# Patient Record
Sex: FEMALE | HISPANIC OR LATINO | Employment: FULL TIME | URBAN - METROPOLITAN AREA
[De-identification: names, ages, dates, MRNs, and addresses within clinical notes are randomized per-mention and may not be internally consistent; named-entity substitution may affect disease eponyms.]

---

## 2018-10-17 ENCOUNTER — OFFICE VISIT (OUTPATIENT)
Dept: OBGYN | Facility: CLINIC | Age: 23
End: 2018-10-17
Payer: COMMERCIAL

## 2018-10-17 VITALS
BODY MASS INDEX: 28.9 KG/M2 | WEIGHT: 158 LBS | HEART RATE: 67 BPM | SYSTOLIC BLOOD PRESSURE: 107 MMHG | DIASTOLIC BLOOD PRESSURE: 54 MMHG | TEMPERATURE: 97.8 F

## 2018-10-17 DIAGNOSIS — Z00.00 ANNUAL PHYSICAL EXAM: Primary | ICD-10-CM

## 2018-10-17 LAB
B-HCG SERPL-ACNC: <1 IU/L (ref 0–5)
CHOLEST SERPL-MCNC: 188 MG/DL
ERYTHROCYTE [DISTWIDTH] IN BLOOD BY AUTOMATED COUNT: 13.9 % (ref 10–15)
HCT VFR BLD AUTO: 38.6 % (ref 35–47)
HDLC SERPL-MCNC: 56 MG/DL
HGB BLD-MCNC: 12.8 G/DL (ref 11.7–15.7)
LDLC SERPL CALC-MCNC: 108 MG/DL
MCH RBC QN AUTO: 27.1 PG (ref 26.5–33)
MCHC RBC AUTO-ENTMCNC: 33.2 G/DL (ref 31.5–36.5)
MCV RBC AUTO: 82 FL (ref 78–100)
NONHDLC SERPL-MCNC: 132 MG/DL
PLATELET # BLD AUTO: 301 10E9/L (ref 150–450)
RBC # BLD AUTO: 4.72 10E12/L (ref 3.8–5.2)
TRIGL SERPL-MCNC: 121 MG/DL
TSH SERPL DL<=0.005 MIU/L-ACNC: 2.07 MU/L (ref 0.4–4)
WBC # BLD AUTO: 7.9 10E9/L (ref 4–11)

## 2018-10-17 PROCEDURE — 36415 COLL VENOUS BLD VENIPUNCTURE: CPT | Performed by: OBSTETRICS & GYNECOLOGY

## 2018-10-17 PROCEDURE — 87491 CHLMYD TRACH DNA AMP PROBE: CPT | Performed by: OBSTETRICS & GYNECOLOGY

## 2018-10-17 PROCEDURE — 84443 ASSAY THYROID STIM HORMONE: CPT | Performed by: OBSTETRICS & GYNECOLOGY

## 2018-10-17 PROCEDURE — 84702 CHORIONIC GONADOTROPIN TEST: CPT | Performed by: OBSTETRICS & GYNECOLOGY

## 2018-10-17 PROCEDURE — G0145 SCR C/V CYTO,THINLAYER,RESCR: HCPCS | Performed by: OBSTETRICS & GYNECOLOGY

## 2018-10-17 PROCEDURE — 80061 LIPID PANEL: CPT | Performed by: OBSTETRICS & GYNECOLOGY

## 2018-10-17 PROCEDURE — 85027 COMPLETE CBC AUTOMATED: CPT | Performed by: OBSTETRICS & GYNECOLOGY

## 2018-10-17 PROCEDURE — 87591 N.GONORRHOEAE DNA AMP PROB: CPT | Performed by: OBSTETRICS & GYNECOLOGY

## 2018-10-17 PROCEDURE — 99395 PREV VISIT EST AGE 18-39: CPT | Performed by: OBSTETRICS & GYNECOLOGY

## 2018-10-17 NOTE — PROGRESS NOTES
Dario is a 23 year old  female who presents for annual exam.     Menses are irregular and irregular lasting 7 days.  Menses flow: normal.  No LMP recorded.. Using none for contraception.  She is currently considering pregnancy.  Has been trying for the last 2 months, but her periods are slightly irregular.  Besides routine health maintenance, she has no other health concerns today .  GYNECOLOGIC HISTORY:  Menarche: 11  Age at first intercourse: 15 Number of lifetime partners: less than 6  Dario is sexually active with 1 male partner(s) and is currently in monogamous relationship with .    History sexually transmitted infections:No STD history  STI testing offered?  Accepted  CYNDEE exposure: No  History of abnormal Pap smear: NO - age 21-29 PAP every 3 years recommended  Family history of breast CA: No  Family history of uterine/ovarian CA: No    Family history of colon CA: No    HEALTH MAINTENANCE:  Cholesterol: (No results found for: CHOL History of abnormal lipids: No  Mammo: 0 . History of abnormal Mammo: Not applicable, 0.  Regular Self Breast Exams: Yes  Calcium/Vitamin D intake: source:  dairy Adequate? Yes  TSH: (  TSH   Date Value Ref Range Status   10/14/2014 2.36 mcU/mL Final    )  Pap; (No results found for: PAP )    HISTORY:  Obstetric History       T1      L2     SAB1   TAB0   Ectopic0   Multiple0   Live Births2       # Outcome Date GA Lbr Azael/2nd Weight Sex Delivery Anes PTL Lv   3  12/31/15 34w1d 01:45 / 00:22 6 lb 9.8 oz (3 kg) M Vag-Spont None Y JIM      Apgar1:  8                Apgar5: 9   2 SAB 2015 9w0d    SAB      1 Term 12 38w4d 06:45 / 01:13 6 lb 9 oz (2.977 kg) M Vag-Spont Local N JIM      Name: BLADIMIR,JOHN CARMICHAEL      Apgar1:  9                Apgar5: 9        Past Medical History:   Diagnosis Date     Diabetes mellitus (H)     gestational     NO ACTIVE PROBLEMS      Past Surgical History:   Procedure Laterality Date     NO HISTORY OF SURGERY        Family History   Problem Relation Age of Onset     Cancer Maternal Uncle      brain or spinal cord cancer?     Social History     Social History     Marital status:      Spouse name: N/A     Number of children: N/A     Years of education: N/A     Social History Main Topics     Smoking status: Never Smoker     Smokeless tobacco: Never Used     Alcohol use No     Drug use: No     Sexual activity: Yes     Partners: Male     Other Topics Concern     None     Social History Narrative     No current outpatient prescriptions on file.   No Known Allergies    Past medical, surgical, social and family history were reviewed and updated in EPIC.    ROS:   C:     NEGATIVE for fever, chills, change in weight  I:       NEGATIVE for worrisome rashes, moles or lesions  E:     NEGATIVE for vision changes or irritation  E/M: NEGATIVE for ear, mouth and throat problems  R:     NEGATIVE for significant cough or SOB  CV:   NEGATIVE for chest pain, palpitations or peripheral edema  GI:     NEGATIVE for nausea, abdominal pain, heartburn, or change in bowel habits  :   NEGATIVE for frequency, dysuria, hematuria, vaginal discharge, or irregular bleeding  M:     NEGATIVE for significant arthralgias or myalgia  N:      NEGATIVE for weakness, dizziness or paresthesias  E:      NEGATIVE for temperature intolerance, skin/hair changes  P:      NEGATIVE for changes in mood or affect.    EXAM:  /54 (BP Location: Left arm, Patient Position: Sitting, Cuff Size: Adult Regular)  Pulse 67  Temp 97.8  F (36.6  C) (Oral)  Wt 158 lb (71.7 kg)  BMI 28.9 kg/m2   BMI: Body mass index is 28.9 kg/(m^2).  Constitutional: healthy, alert and no distress  Head: Normocephalic. No masses, lesions, tenderness or abnormalities  Neck: Neck supple. Trachea midline. No adenopathy. Thyroid symmetric, normal size.   Cardiovascular: RRR.   Respiratory: Negative.   Breast: No nodularity, asymmetry or nipple discharge bilaterally.  Gastrointestinal:  Abdomen soft, non-tender, non-distended. No masses, organomegaly.  :  Vulva:  No external lesions, normal female hair distribution, no inguinal adenopathy.    Urethra:  Midline, non-tender, well supported, no discharge  Vagina:  Moist, pink, no abnormal discharge, no lesions  Uterus:  Normal size, anteverted , non-tender, freely mobile  Ovaries:  No masses appreciated, non-tender, mobile  Rectal Exam: deferred  Musculoskeletal: extremities normal  Skin: no suspicious lesions or rashes  Psychiatric: Affect appropriate, cooperative,mentation appears normal.     COUNSELING:   Reviewed preventive health counseling, as reflected in patient instructions       Healthy diet/nutrition       Family planning   reports that she has never smoked. She has never used smokeless tobacco.    Body mass index is 28.9 kg/(m^2).    FRAX Risk Assessment    ASSESSMENT:  23 year old female with satisfactory annual exam  (Z00.00) Annual physical exam  (primary encounter diagnosis)  Comment: Normal exam today.  Pap and STI screening obtained.  Recommended she track her cycles and if not pregnant within 6 months, she could come back to the clinic to discuss her irregular cycles and trying to conceive.  We did discuss that 80-90% of women will get pregnant spontaneously within the first year of trying.  Plan: Lipid Profile, TSH with free T4 reflex, CBC         with platelets, HCG Quantitative Pregnancy,         Blood ([id number]), NEISSERIA GONORRHOEA PCR,         CHLAMYDIA TRACHOMATIS PCR, Pap imaged thin         layer screen reflex to HPV if ASCUS - recommend        age 25 - 29            Erin Hyman MD

## 2018-10-17 NOTE — MR AVS SNAPSHOT
"              After Visit Summary   10/17/2018    Dario Nathan    MRN: [id number]           Patient Information     Date Of Birth          1995        Visit Information        Provider Department      10/17/2018 11:15 AM Erin Hyman MD [facility]        Today's Diagnoses     Annual physical exam    -  1       Follow-ups after your visit        Who to contact     If you have questions or need follow up information about today's clinic visit or your schedule please contact [facility] directly at [phone].  Normal or non-critical lab and imaging results will be communicated to you by Gramcohart, letter or phone within 4 business days after the clinic has received the results. If you do not hear from us within 7 days, please contact the clinic through Gramcohart or phone. If you have a critical or abnormal lab result, we will notify you by phone as soon as possible.  Submit refill requests through Geoloqi or call your pharmacy and they will forward the refill request to us. Please allow 3 business days for your refill to be completed.          Additional Information About Your Visit        MyChart Information     Geoloqi lets you send messages to your doctor, view your test results, renew your prescriptions, schedule appointments and more. To sign up, go to [email or web address] . Click on \"Log in\" on the left side of the screen, which will take you to the Welcome page. Then click on \"Sign up Now\" on the right side of the page.     You will be asked to enter the access code listed below, as well as some personal information. Please follow the directions to create your username and password.     Your access code is: [email or web address]  Expires: 1/15/2019 11:56 AM     Your access code will  in 90 days. If you need help or a new code, please call your [facility] or [phone].        Care EveryWhere ID     This is your Care EveryWhere ID. This could be used by other " organizations to access your [place] medical records  [id number]        Your Vitals Were     Pulse Temperature BMI (Body Mass Index)             67 97.8  F (36.6  C) (Oral) 28.9 kg/m2          Blood Pressure from Last 3 Encounters:   10/17/18 107/54   02/25/16 102/67   01/02/16 112/68    Weight from Last 3 Encounters:   10/17/18 158 lb (71.7 kg)   02/25/16 152 lb 4.8 oz (69.1 kg)   12/29/15 156 lb 11.2 oz (71.1 kg)              We Performed the Following     CBC with platelets     HCG Quantitative Pregnancy, Blood ([id number])     Lipid Profile     TSH with free T4 reflex        Primary Care Provider Fax #    Physician No Ref-Primary [phone]       No address on file        Equal Access to Services     NIEVES REMY : Neelam Gonzalez, walynda ul, bashir kaalmamary norris, betsy stratton . So [facility] [phone].    ATENCIÓN: Si habla español, tiene a castro disposición servicios gratuitos de asistencia lingüística. Llame al [phone].    We comply with applicable federal civil rights laws and [place] laws. We do not discriminate on the basis of race, color, national origin, age, disability, sex, sexual orientation, or gender identity.            Thank you!     Thank you for choosing [facility]  for your care. Our goal is always to provide you with excellent care. Hearing back from our patients is one way we can continue to improve our services. Please take a few minutes to complete the written survey that you may receive in the mail after your visit with us. Thank you!             Your Updated Medication List - Protect others around you: Learn how to safely use, store and throw away your medicines at www.disposemymeds.org.      Notice  As of 10/17/2018 11:56 AM    You have not been prescribed any medications.

## 2018-10-17 NOTE — NURSING NOTE
"Chief Complaint   Patient presents with     Physical       Initial /54 (BP Location: Left arm, Patient Position: Sitting, Cuff Size: Adult Regular)  Pulse 67  Temp 97.8  F (36.6  C) (Oral)  Wt 158 lb (71.7 kg)  BMI 28.9 kg/m2 Estimated body mass index is 28.9 kg/(m^2) as calculated from the following:    Height as of 11/11/15: 5' 2\" (1.575 m).    Weight as of this encounter: 158 lb (71.7 kg).  BP completed using cuff size: regular        The following HM Due: pap smear      The following patient reported/Care Every where data was sent to:  P ABSTRACT QUALITY INITIATIVES [[id number]]  EDIS Acevedo           "

## 2018-10-20 LAB
C TRACH DNA SPEC QL NAA+PROBE: NEGATIVE
N GONORRHOEA DNA SPEC QL NAA+PROBE: NEGATIVE
SPECIMEN SOURCE: NORMAL
SPECIMEN SOURCE: NORMAL

## 2018-10-22 LAB
COPATH REPORT: NORMAL
PAP: NORMAL

## 2019-10-04 ENCOUNTER — HOSPITAL ENCOUNTER (EMERGENCY)
Facility: CLINIC | Age: 24
Discharge: HOME | End: 2019-10-05
Attending: EMERGENCY MEDICINE | Admitting: EMERGENCY MEDICINE
Payer: COMMERCIAL

## 2019-10-04 DIAGNOSIS — B00.9 HSV (HERPES SIMPLEX VIRUS) INFECTION: ICD-10-CM

## 2019-10-04 PROCEDURE — 87491 CHLMYD TRACH DNA AMP PROBE: CPT | Performed by: EMERGENCY MEDICINE

## 2019-10-04 PROCEDURE — 81025 URINE PREGNANCY TEST: CPT | Performed by: EMERGENCY MEDICINE

## 2019-10-04 PROCEDURE — 87210 SMEAR WET MOUNT SALINE/INK: CPT | Performed by: EMERGENCY MEDICINE

## 2019-10-04 PROCEDURE — 81001 URINALYSIS AUTO W/SCOPE: CPT | Performed by: EMERGENCY MEDICINE

## 2019-10-04 PROCEDURE — 87529 HSV DNA AMP PROBE: CPT | Performed by: EMERGENCY MEDICINE

## 2019-10-04 PROCEDURE — 99284 EMERGENCY DEPT VISIT MOD MDM: CPT

## 2019-10-04 PROCEDURE — 87591 N.GONORRHOEAE DNA AMP PROB: CPT | Performed by: EMERGENCY MEDICINE

## 2019-10-04 ASSESSMENT — ENCOUNTER SYMPTOMS
DYSURIA: 1
ABDOMINAL PAIN: 0
FEVER: 0

## 2019-10-04 NOTE — ED AVS SNAPSHOT
[facility] Emergency Department  [address]  [facility] [address]  Phone:  [phone]  Fax:  [phone]                                    Dario Nathan   MRN: [id number]    Department:  [facility] Emergency Department   Date of Visit:  10/4/2019           After Visit Summary Signature Page    I have received my discharge instructions, and my questions have been answered. I have discussed any challenges I see with this plan with the nurse or doctor.    ..........................................................................................................................................  Patient/Patient Representative Signature      ..........................................................................................................................................  Patient Representative Print Name and Relationship to Patient    ..................................................               ................................................  Date                                   Time    ..........................................................................................................................................  Reviewed by Signature/Title    ...................................................              ..............................................  Date                                               Time          22EPIC Rev 08/18

## 2019-10-05 VITALS
SYSTOLIC BLOOD PRESSURE: 124 MMHG | RESPIRATION RATE: 18 BRPM | HEART RATE: 92 BPM | OXYGEN SATURATION: 97 % | TEMPERATURE: 98 F | DIASTOLIC BLOOD PRESSURE: 83 MMHG

## 2019-10-05 LAB
ALBUMIN UR-MCNC: NEGATIVE MG/DL
AMORPH CRY #/AREA URNS HPF: ABNORMAL /HPF
APPEARANCE UR: CLEAR
BILIRUB UR QL STRIP: NEGATIVE
COLOR UR AUTO: ABNORMAL
GLUCOSE UR STRIP-MCNC: NEGATIVE MG/DL
HCG UR QL: NEGATIVE
HGB UR QL STRIP: NEGATIVE
KETONES UR STRIP-MCNC: NEGATIVE MG/DL
LEUKOCYTE ESTERASE UR QL STRIP: NEGATIVE
MUCOUS THREADS #/AREA URNS LPF: PRESENT /LPF
NITRATE UR QL: NEGATIVE
PH UR STRIP: 7 PH (ref 5–7)
RBC #/AREA URNS AUTO: <1 /HPF (ref 0–2)
SOURCE: ABNORMAL
SP GR UR STRIP: 1.01 (ref 1–1.03)
SPECIMEN SOURCE: NORMAL
SQUAMOUS #/AREA URNS AUTO: <1 /HPF (ref 0–1)
UROBILINOGEN UR STRIP-MCNC: NORMAL MG/DL (ref 0–2)
WBC #/AREA URNS AUTO: <1 /HPF (ref 0–5)
WET PREP SPEC: NORMAL

## 2019-10-05 RX ORDER — ACYCLOVIR 400 MG/1
400 TABLET ORAL EVERY 8 HOURS
Qty: 30 TABLET | Refills: 0 | Status: SHIPPED | OUTPATIENT
Start: 2019-10-05 | End: 2022-05-18

## 2019-10-05 NOTE — ED PROVIDER NOTES
History     Chief Complaint:    Vaginal Rash    The history is provided by the patient.      Dario Nathan is a 23 year old female who presents with blister-like rashes on the outer lips of her labia. She first noticed them 3 days ago. The patient reports dysuria. She denies any discharge, itchiness, abdominal pain, fevers, or vaginal pain. The patient denies any new partners, but did do a one-time swap of partners with another couple 7 days ago. The patient did use condoms and reported using new female sanitizing wipes.    Allergies:  No Known Drug Allergies     Medications:    The patient is currently on no regular medications.    Past Medical History:    Diabetes mellitus    Past Surgical History:    The patient does not have any pertinent past surgical history.    Family History:    No past pertinent family history.    Social History:  Negative for tobacco use.  Negative for alcohol use.  Negative for drug use.  Marital Status:   [2]     Review of Systems   Constitutional: Negative for fever.   Gastrointestinal: Negative for abdominal pain.   Genitourinary: Positive for dysuria. Negative for vaginal discharge and vaginal pain.        Denies vaginal itchiness   Skin: Positive for rash.   All other systems reviewed and are negative.        Physical Exam     Patient Vitals for the past 24 hrs:   BP Temp Heart Rate Resp SpO2   10/04/19 2302 132/72 98  F (36.7  C) 84 18 97 %     Physical Exam    Constitutional:  Oriented to person, place, and time. Well appearing.  HENT:   Head:    Normocephalic.   Mouth/Throat:   Oropharynx is clear and moist.   Eyes:    EOM are normal. Pupils are equal, round, and reactive to light.   Neck:    Neck supple.   Abdominal:   Soft. No distension. No tenderness. No rebound and no guarding.   Neurological:   Alert and oriented to person, place, and time.           Moves all 4 extremities spontaneously    Skin:    No rash noted. No pallor.   :    Multiple vesicles noted  bilaterally along labia majora, left greater than right. White vaginal discharge present. No CMT, no adnexal tenderness.    Emergency Department Course     Laboratory:  Laboratory findings were communicated with the patient who voiced understanding of the findings.    HSV1 and 2 DNA by PCR: Pending  Wet prep: Moderate PMNs seen o/w WNL.  Chlamydia trachomatis PCR: Pending  Neisseria gonorrhea PCR: Pending    HCG qualitative urine: Negative  UA: clear, yellow urine, mucous present, amorphous crystals few o/w negative    Emergency Department Course:  Past medical records, nursing notes, and vitals reviewed.    2332: I performed an exam of the patient as documented above.     IV was inserted and blood was drawn for laboratory testing, results above.    The patient provided a urine sample here in the emergency department. This was sent for laboratory testing, findings above.    0044: Patient rechecked and updated.      I personally reviewed the laboratory results with the Patient and answered all related questions prior to discharge.     Findings and plan explained to the Patient. Patient discharged home with instructions regarding supportive care, medications, and reasons to return. The importance of close follow-up was reviewed.     Impression & Plan     Medical Decision Making:  Dario Nathan is a 23 year old female who presents for evaluation of rash on the labia majora.  This is clinically consistent with HSV with grouped vesicles on an erythematous base.  Would treat with valtrex bid as no contraindications.  HSV PCR was sent.  Follow up with primary to discuss PCR result and suppressive therapy.  Recommended other STI testing per primary.  No signs of cellulitis in the HSV area.       Discharge Diagnosis:    ICD-10-CM    1. HSV (herpes simplex virus) infection B00.9      Disposition:  Discharged to home.    Discharge Medications:  New Prescriptions    ACYCLOVIR (ZOVIRAX) 400 MG TABLET    Take 1 tablet (400 mg) by  mouth every 8 hours for 10 days     Scribe Disclosure:  I, Berenice Ac, am serving as a scribe at 11:36 PM on 10/4/2019 to document services personally performed by Horace Vasquez MD based on my observations and the provider's statements to me.     Berenice Shen  10/4/2019   [facility] EMERGENCY DEPARTMENT       Horace Vasquez MD  10/06/19 0029

## 2019-10-05 NOTE — ED TRIAGE NOTES
"Patient states \" I started to have pain on my labia 2 days ago, and now have a cluster of blisters\"    Denies vaginal discharge   "

## 2019-10-06 ENCOUNTER — TELEPHONE (OUTPATIENT)
Dept: EMERGENCY MEDICINE | Facility: CLINIC | Age: 24
End: 2019-10-06

## 2019-10-06 LAB
C TRACH DNA SPEC QL NAA+PROBE: NEGATIVE
HSV1 DNA SPEC QL NAA+PROBE: POSITIVE
HSV2 DNA SPEC QL NAA+PROBE: NEGATIVE
N GONORRHOEA DNA SPEC QL NAA+PROBE: NEGATIVE
SPECIMEN SOURCE: ABNORMAL
SPECIMEN SOURCE: NORMAL
SPECIMEN SOURCE: NORMAL

## 2019-10-06 NOTE — TELEPHONE ENCOUNTER
[facility] Emergency Department Lab result notification:    [place] ED lab result protocol used  Herpes simplex protocol    Reason for call  Notify of lab results, assess symptoms,  review ED providers recommendations/discharge instructions (if necessary) and advise per ED lab result f/u protocol    Lab Result   Final result for HSV 1 is [POSITIVE] and HSV 2 DNA by PCR is [NEGATIVE].    [place] ED discharge antiviral medication (if prescribed): Acyclovir (ZOVIRAX) 400 MG tablet, Take 1 tablet (400 mg) by mouth every 8 hours for 10 days  If treated in the [place] ED, Notify patient of result.  Information table from ED Provider visit on 10/4/19-10/5/19  Symptoms reported at ED visit (Chief complaint, HPI) Vaginal Rash     The history is provided by the patient.      Dario Nathan is a 23 year old female who presents with blister-like rashes on the outer lips of her labia. She first noticed them 3 days ago. The patient reports dysuria. She denies any discharge, itchiness, abdominal pain, fevers, or vaginal pain. The patient denies any new partners, but did do a one-time swap of partners with another couple 7 days ago. The patient did use condoms and reported using new female sanitizing wipes.   ED providers Impression and Plan (applicable information) Dario Nathan is a 23 year old female who presents for evaluation of rash on the labia majora.  This is clinically consistent with HSV with grouped vesicles on an erythematous base.  Would treat with valtrex bid as no contraindications.  HSV PCR was sent.  Follow up with primary to discuss PCR result and suppressive therapy.  Recommended other STI testing per primary.  No signs of cellulitis in the HSV area.      Miscellaneous information Negative gonorrhea and chlamydia results     RN Assessment (Patient s current Symptoms), include time called.  [Insert Left message here if message left]  5:20PM:  Patient returned call. States she is feeling alittle bit  better, sores are not quite as painful.   RN Recommendations/Instructions per [place] ED lab result protocol  Patient notified of lab result and treatment recommendation.     Information about Herpes Simplex virus reviewed with Patient:     If antiviral medication prescribed, Patient advised to follow-up with PCP within a week as directed by the ED provider.     Initiation of oral antiviral therapy within 72 hours of lesion appearance may decrease duration and severity of illness by days to weeks.    Stress the importance to informing current sexual partners about genital herpes and informing future partners before initiating sexual relationship.    Encourage patient to contact PCP clinic if symptoms worsen or other concerning symptoms.  Patient can always consider returning to the ED if symptoms are worse or other concerning symptoms.    If applicable, have patient f/u with PCP regarding testing for HSV 1 or HSV 2    Genital Herpes - Summary points below:  o Genital herpes is a viral infection that is spread during sex.  o Symptoms of genital herpes include blisters in the genital area (eg, penis, buttocks, anus, vulva). The blisters become painful ulcers. Some people have no symptoms at all.  o It is possible to spread herpes even if there are no visible ulcers. It is not possible to catch herpes by touching a surface (door knobs, toilet seat, bed sheets).  o Antiviral medications help to speed healing of ulcers in people who have just been infected or in those who are having repeat outbreaks.   o Some people who have herpes outbreaks take medicine every day to prevent future outbreaks or prevent spread to their sex partner.      Patient is comfortable with the plan of care and has no further questions.   She has a follow up scheduled with a provider for 10/8/19    Please Contact your PCP clinic or return to the Emergency department if your:    Symptoms worsen or other concerning symptom's.    PCP follow-up  Questions asked: YES       [RN Name]  Ning Mooney RN  [employer] RN  Lung Nodule and ED Lab Result RN  Epic pool (ED late result f/u RN): P [id number]  [facility] INCIDENTAL RADIOLOGY F/U NURSES: P [id number]  # [phone]      Copy of Lab result   HSV 1 and 2 DNA by PCR [[id number]] (Order [id number])   Order Requisition     HSV 1 and 2 DNA by PCR (Order #[id number]) on 10/4/19   Exam Information     Exam Date Exam Time Accession # Results    10/4/19 11:55 PM [id number]    Component Value Flag Ref Range Units Status Collected Lab   HSV Specimen Type Genital     Final 10/04/2019 11:55 PM FrRdHs   HSV Type 1 PCR Positive  Abnormal   NEG^Negative  Final 10/04/2019 11:55    HSV Type 2 PCR Negative   NEG^Negative  Final 10/04/2019 11:55    Comment:     The qualitative Herpes simplex virus DNA assay is a real-time polymerase   chain reaction (PCR) utilizing analyte specific reagents manufactured by [employer].  Analyte Specific Reagents (ASRs) are used in many laboratory   tests necessary for standard medical care and generally do not require FDA   approval.     This test was developed and its performance characteristics determined by   the [facility].  It has not been   cleared or approved by the US Food and Drug Administration.

## 2019-10-08 ENCOUNTER — OFFICE VISIT (OUTPATIENT)
Dept: INTERNAL MEDICINE | Facility: CLINIC | Age: 24
End: 2019-10-08
Payer: COMMERCIAL

## 2019-10-08 VITALS
HEART RATE: 78 BPM | HEIGHT: 62 IN | SYSTOLIC BLOOD PRESSURE: 106 MMHG | OXYGEN SATURATION: 100 % | BODY MASS INDEX: 29.37 KG/M2 | TEMPERATURE: 98.4 F | WEIGHT: 159.6 LBS | RESPIRATION RATE: 18 BRPM | DIASTOLIC BLOOD PRESSURE: 66 MMHG

## 2019-10-08 DIAGNOSIS — Z11.3 SCREEN FOR STD (SEXUALLY TRANSMITTED DISEASE): ICD-10-CM

## 2019-10-08 DIAGNOSIS — B00.9 HSV (HERPES SIMPLEX VIRUS) INFECTION: Primary | ICD-10-CM

## 2019-10-08 LAB — HIV 1+2 AB+HIV1 P24 AG SERPL QL IA: NONREACTIVE

## 2019-10-08 PROCEDURE — 87389 HIV-1 AG W/HIV-1&-2 AB AG IA: CPT | Performed by: INTERNAL MEDICINE

## 2019-10-08 PROCEDURE — 36415 COLL VENOUS BLD VENIPUNCTURE: CPT | Performed by: INTERNAL MEDICINE

## 2019-10-08 PROCEDURE — 99202 OFFICE O/P NEW SF 15 MIN: CPT | Performed by: INTERNAL MEDICINE

## 2019-10-08 ASSESSMENT — MIFFLIN-ST. JEOR: SCORE: 1422.19

## 2019-10-08 NOTE — NURSING NOTE
"/66 (BP Location: Right arm, Patient Position: Sitting, Cuff Size: Adult Large)   Pulse 78   Temp 98.4  F (36.9  C) (Oral)   Resp 18   Ht 1.575 m (5' 2\")   Wt 72.4 kg (159 lb 9.6 oz)   LMP 08/28/2019 (Approximate)   SpO2 100%   Breastfeeding? No   BMI 29.19 kg/m    Alejandrina Jacobs CMA    "

## 2019-10-08 NOTE — PROGRESS NOTES
"Subjective     Dario Nathan is a 23 year old female who presents to clinic today for the following health issues:    HPI   A few weeks ago she and her  had sex with another couple for the first time. Last week she developed painful sores around the vaginal area was seen in ER and given Valtrex tid. Since she she has had a couple new areas of breakout but they have not progressed to open ulcers get raised red irritated and then resolve. She has no rectal sores and no constipation. She tested negative for GC and Chlamydia. She has never had an STD and would like to be checked for HIV.Denies any fever chills or night sweats. Energy is good. She has been able to work even though her job requires a lot of walking. No vaginal discharge       BP Readings from Last 3 Encounters:   10/08/19 106/66   10/05/19 124/83   10/17/18 107/54    Wt Readings from Last 3 Encounters:   10/08/19 72.4 kg (159 lb 9.6 oz)   10/17/18 71.7 kg (158 lb)   02/25/16 69.1 kg (152 lb 4.8 oz)                 Reviewed and updated as needed this visit by Provider         Review of Systems   ROS COMP: Constitutional, HEENT, cardiovascular, pulmonary, GI, , musculoskeletal, neuro, skin, endocrine and psych systems are negative, except as otherwise noted.      Objective    /66 (BP Location: Right arm, Patient Position: Sitting, Cuff Size: Adult Large)   Pulse 78   Temp 98.4  F (36.9  C) (Oral)   Resp 18   Ht 1.575 m (5' 2\")   Wt 72.4 kg (159 lb 9.6 oz)   LMP 08/28/2019 (Approximate)   SpO2 100%   Breastfeeding? No   BMI 29.19 kg/m    Body mass index is 29.19 kg/m .  Physical Exam   GENERAL: healthy, alert and no distress  HENT: ear canals and TM's normal, nose and mouth without ulcers or lesions  NECK: no adenopathy, no asymmetry, masses, or scars and thyroid normal to palpation  RESP: lungs clear to auscultation - no rales, rhonchi or wheezes  CV: regular rate and rhythm, normal S1 S2, no S3 or S4, no murmur, click or rub, no " "peripheral edema and peripheral pulses strong  ABDOMEN: soft, nontender, no hepatosplenomegaly, no masses and bowel sounds normal   (female): at vaginal opening there is 3 ulcers do not see any pre ulcer lesions starting, rectal area looks clear  MS: no gross musculoskeletal defects noted, no edema          Assessment & Plan     1. HSV (herpes simplex virus) infection  Doing well, am a little concerned that she say she has had new lesions, will recheck in 1 week. Advised what to expect in the future, when she is most likely to transmit the virus etc.     2. Screen for STD (sexually transmitted disease)  Will check   - HIV Antigen Antibody Combo     BMI:   Estimated body mass index is 29.19 kg/m  as calculated from the following:    Height as of this encounter: 1.575 m (5' 2\").    Weight as of this encounter: 72.4 kg (159 lb 9.6 oz).   Weight management plan: Discussed healthy diet and exercise guidelines      No follow-ups on file.    Kamilah Mendoza MD  [facility]        "

## 2020-03-02 ENCOUNTER — HEALTH MAINTENANCE LETTER (OUTPATIENT)
Age: 25
End: 2020-03-02

## 2020-12-14 ENCOUNTER — HEALTH MAINTENANCE LETTER (OUTPATIENT)
Age: 25
End: 2020-12-14

## 2021-04-18 ENCOUNTER — HEALTH MAINTENANCE LETTER (OUTPATIENT)
Age: 26
End: 2021-04-18

## 2021-10-02 ENCOUNTER — HEALTH MAINTENANCE LETTER (OUTPATIENT)
Age: 26
End: 2021-10-02

## 2022-05-14 ENCOUNTER — HEALTH MAINTENANCE LETTER (OUTPATIENT)
Age: 27
End: 2022-05-14

## 2022-05-18 ENCOUNTER — OFFICE VISIT (OUTPATIENT)
Dept: OBGYN | Facility: CLINIC | Age: 27
End: 2022-05-18
Payer: COMMERCIAL

## 2022-05-18 VITALS
HEART RATE: 62 BPM | DIASTOLIC BLOOD PRESSURE: 63 MMHG | HEIGHT: 62 IN | BODY MASS INDEX: 28.8 KG/M2 | WEIGHT: 156.5 LBS | SYSTOLIC BLOOD PRESSURE: 109 MMHG

## 2022-05-18 DIAGNOSIS — O03.9 FOLLOW-UP VISIT AFTER MISCARRIAGE: Primary | ICD-10-CM

## 2022-05-18 DIAGNOSIS — Z51.89 FOLLOW-UP VISIT AFTER MISCARRIAGE: Primary | ICD-10-CM

## 2022-05-18 DIAGNOSIS — R73.03 PREDIABETES: ICD-10-CM

## 2022-05-18 LAB — HBA1C MFR BLD: 6.4 % (ref 0–5.6)

## 2022-05-18 PROCEDURE — 99203 OFFICE O/P NEW LOW 30 MIN: CPT | Performed by: OBSTETRICS & GYNECOLOGY

## 2022-05-18 PROCEDURE — 84443 ASSAY THYROID STIM HORMONE: CPT | Performed by: OBSTETRICS & GYNECOLOGY

## 2022-05-18 PROCEDURE — 83036 HEMOGLOBIN GLYCOSYLATED A1C: CPT | Performed by: OBSTETRICS & GYNECOLOGY

## 2022-05-18 PROCEDURE — 36415 COLL VENOUS BLD VENIPUNCTURE: CPT | Performed by: OBSTETRICS & GYNECOLOGY

## 2022-05-18 ASSESSMENT — PATIENT HEALTH QUESTIONNAIRE - PHQ9: SUM OF ALL RESPONSES TO PHQ QUESTIONS 1-9: 1

## 2022-05-18 NOTE — PROGRESS NOTES
[facility]- OBGYN    CC: miscarriage follow up     S:Dario Nathan is a 26 year old  who presents today for miscarriage follow up.    Per chart review via care everywhere:  22- Patient experiencing cramping and bleeding at home with a positive pregnancy test.  Was seen and found to have beta HCG=42, BV on wet prep, and ultrasound showed No IUP and no signs of ectopic  5/10/22- followed up and repeat beta HCG performed and was decreased to 24.    Patient reports the last time she had bleeding was 22.  Her pelvic cramping stopped 3 days ago.  She is sad and upset about the loss. Her and her partner (father of her other children) had been trying for pregnancy for 3 years prior to this most recent pregnancy.  She has a history of miscarriage at 9 weeks previously in .  She has had 2 prior vaginal deliveries (the miscarriage in  was between two prior live births).  She took a home pregnancy test on 22 and it was negative.  She is wondering why this miscarriage happened and what she should do next.      OBGYN Hx:   OB History    Para Term  AB Living   3 2 1 1 1 2   SAB IAB Ectopic Multiple Live Births   1 0 0 0 2      # Outcome Date GA Lbr Azael/2nd Weight Sex Delivery Anes PTL Lv   3  12/31/15 34w1d 01:45 / 00:22 3 kg (6 lb 9.8 oz) M Vag-Spont None Y JIM      Apgar1: 8  Apgar5: 9   2 SAB 2015 9w0d    SAB      1 Term 12 38w4d 06:45 / 01:13 2.977 kg (6 lb 9 oz) M Vag-Spont Local N JIM      Name: JOHN GARRISON      Apgar1: 9  Apgar5: 9       Menses: sometimes a little irregular, but about a month apart  Sexually active with one male partner    PMH: none  PSH:none  Meds:acyclovir, PNV  Allergies: NKDA  SH: Denies any tobacco, alcohol, or drug use    O:   Patient Vitals for the past 24 hrs:   BP Pulse Weight   22 0928 109/63 62 71 kg (156 lb 8 oz)   ]  Exam:  General- awake, alert, answering questions appropriately, appears tearful throughout  visit  CV- regular rate  Lung-breathing comfortably on room air    A&P: Dario Nathan is a 26 year old  who presents today for miscarriage follow up.      (Z51.89,  O03.9) Follow-up visit after miscarriage  (primary encounter diagnosis)  Comment: Offered condolences to patient.  Assured her this was not her fault and didn't happen because of anything she did.  Explained we do not know why most miscarriages happen, but most commonly they are due to genetic abnormalities in the pregnancy.  Discussed that given she has not had recurrent (sequential) miscarriages we would not recommend proceeding with a work up for APLS at this time.  Discussed diabetes and abnormal thyroid can impact pregnancy and thus will check for these today.  Recommend waiting until next spontaneous prior prior to trying for pregnancy.  Discussed timing of return of period.  In next pregnancy would recommend calling when she has positive pregnancy test then coming in for serial beta HCG and early 6-8 week ultrasound.  Discussed emotional component of miscarriage and offered behavioral health services with Danette Alatorre.  Patient will reach out to our clinic if she decides to take that step.    Plan: TSH with free T4 reflex, Hemoglobin A1c    (R73.03) Prediabetes  Comment: Patient with elevated hemoglobin a1c.  B[employer] message sent recommending establishing care with endocrinology.   Plan: Adult Endocrinology  Referral    Lashaun Pittman MD

## 2022-05-21 LAB — TSH SERPL DL<=0.005 MIU/L-ACNC: 1.65 MU/L (ref 0.4–4)

## 2022-06-20 ENCOUNTER — LAB REQUISITION (OUTPATIENT)
Dept: LAB | Facility: CLINIC | Age: 27
End: 2022-06-20

## 2022-06-20 PROCEDURE — U0003 INFECTIOUS AGENT DETECTION BY NUCLEIC ACID (DNA OR RNA); SEVERE ACUTE RESPIRATORY SYNDROME CORONAVIRUS 2 (SARS-COV-2) (CORONAVIRUS DISEASE [COVID-19]), AMPLIFIED PROBE TECHNIQUE, MAKING USE OF HIGH THROUGHPUT TECHNOLOGIES AS DESCRIBED BY CMS-2020-01-R: HCPCS | Performed by: INTERNAL MEDICINE

## 2022-06-21 LAB — SARS-COV-2 RNA RESP QL NAA+PROBE: NEGATIVE

## 2022-07-05 ENCOUNTER — LAB REQUISITION (OUTPATIENT)
Dept: LAB | Facility: CLINIC | Age: 27
End: 2022-07-05

## 2022-07-05 PROCEDURE — U0005 INFEC AGEN DETEC AMPLI PROBE: HCPCS | Performed by: INTERNAL MEDICINE

## 2022-07-06 LAB — SARS-COV-2 RNA RESP QL NAA+PROBE: NEGATIVE

## 2022-08-01 PROCEDURE — U0005 INFEC AGEN DETEC AMPLI PROBE: HCPCS | Performed by: INTERNAL MEDICINE

## 2022-08-02 ENCOUNTER — LAB REQUISITION (OUTPATIENT)
Dept: LAB | Facility: CLINIC | Age: 27
End: 2022-08-02

## 2022-08-03 LAB — SARS-COV-2 RNA RESP QL NAA+PROBE: NEGATIVE

## 2022-09-03 ENCOUNTER — HEALTH MAINTENANCE LETTER (OUTPATIENT)
Age: 27
End: 2022-09-03

## 2022-09-15 ENCOUNTER — PRENATAL OFFICE VISIT (OUTPATIENT)
Dept: NURSING | Facility: CLINIC | Age: 27
End: 2022-09-15
Payer: COMMERCIAL

## 2022-09-15 VITALS — HEIGHT: 62 IN | WEIGHT: 158 LBS | BODY MASS INDEX: 29.08 KG/M2

## 2022-09-15 DIAGNOSIS — Z86.32 HISTORY OF GESTATIONAL DIABETES MELLITUS: ICD-10-CM

## 2022-09-15 DIAGNOSIS — Z83.3 FAMILY HISTORY OF DIABETES MELLITUS: ICD-10-CM

## 2022-09-15 DIAGNOSIS — Z23 NEED FOR TDAP VACCINATION: ICD-10-CM

## 2022-09-15 DIAGNOSIS — Z34.90 ENCOUNTER FOR SUPERVISION OF NORMAL PREGNANCY: Primary | ICD-10-CM

## 2022-09-15 DIAGNOSIS — O09.899 HISTORY OF PREMATURE DELIVERY, CURRENTLY PREGNANT: ICD-10-CM

## 2022-09-15 PROCEDURE — 99207 PR NO CHARGE NURSE ONLY: CPT

## 2022-09-15 NOTE — PROGRESS NOTES
Important Information for Provider:     New ob nurse intake by phone, fifth pregnancy. Recent SAB 2022. History of gestational diabetes first pregnancy(diet controlled). Second pregnancy was born  at 34 weeks. Ultrasound scheduled for 2022 with CNM to call with results. NOB with CNM 10/11/2021  Ordered A1C with NOB labs    Caffeine intake/servings daily - 16 oz  Calcium intake/servings daily - 3  Exercise 5 times weekly - describe ;walks, precautions given  Sunscreen used - Yes  Seatbelts used - Yes  Guns stored in the home - No  Self Breast Exam - Yes  Pap test up to date -  Yes    Dental exam up to date -  Yes  Immunizations reviewed and up to date - Yes  Abuse: Current or Past (Physical, Sexual or Emotional) - No  Do you feel safe in your environment - Yes  Do you cope well with stress - Yes      Prenatal OB Questionnaire  Patient supplied answers from flow sheet for:  Prenatal OB Questionnaire.  Past Medical History  Have you ever recieved care for your mental health? : No  Have you ever been in a major accident or suffered serious trauma?: No  Within the last year, has anyone hit, slapped, kicked or otherwise hurt you?: No  In the last year, has anyone forced you to have sex when you didn't want to?: No    Past Medical History 2   Have you ever received a blood transfusion?: No  Would you accept a blood transfusion if was medically recommended?: Yes  Does anyone in your home smoke?: No   Is your blood type Rh negative?: No  Have you ever ?: (!) Yes  Have you been hospitalized for a nonsurgical reason excluding normal delivery?: No  Have you ever had an abnormal pap smear?: No    Past Medical History (Continued)  Do you have a history of abnormalities of the uterus?: No  Did your mother take CYNDEE or any other hormones when she was pregnant with you?: No  Do you have any other problems we have not asked about which you feel may be important to this pregnancy?: No                      Allergies as of 9/15/2022:    Allergies as of 09/15/2022     (No Known Allergies)       Current medications are:  Current Outpatient Medications   Medication Sig Dispense Refill     Prenatal Vit-DSS-Fe Cbn-FA (PRENATAL AD PO)            Early ultrasound screening tool:    Does patient have irregular periods?  No  Did patient use hormonal birth control in the three months prior to positive urine pregnancy test? No  Is the patient breastfeeding?  No  Is the patient 10 weeks or greater at time of education visit?  No

## 2022-09-21 ENCOUNTER — VIRTUAL VISIT (OUTPATIENT)
Dept: MIDWIFE SERVICES | Facility: CLINIC | Age: 27
End: 2022-09-21
Payer: COMMERCIAL

## 2022-09-21 ENCOUNTER — ANCILLARY PROCEDURE (OUTPATIENT)
Dept: ULTRASOUND IMAGING | Facility: CLINIC | Age: 27
End: 2022-09-21
Attending: OBSTETRICS & GYNECOLOGY
Payer: COMMERCIAL

## 2022-09-21 DIAGNOSIS — R73.03 PRE-DIABETES: ICD-10-CM

## 2022-09-21 DIAGNOSIS — Z34.81 ENCOUNTER FOR SUPERVISION OF OTHER NORMAL PREGNANCY IN FIRST TRIMESTER: Primary | ICD-10-CM

## 2022-09-21 DIAGNOSIS — Z34.90 ENCOUNTER FOR SUPERVISION OF NORMAL PREGNANCY: ICD-10-CM

## 2022-09-21 PROCEDURE — 99207 PR NO BILLABLE SERVICE THIS VISIT: CPT | Performed by: ADVANCED PRACTICE MIDWIFE

## 2022-09-21 PROCEDURE — 76817 TRANSVAGINAL US OBSTETRIC: CPT | Performed by: OBSTETRICS & GYNECOLOGY

## 2022-09-21 NOTE — PROGRESS NOTES
"Dario is a 26 year old who is being evaluated via a billable telephone visit.      What phone number would you like to be contacted at? [phone]  How would you like to obtain your AVS? [place]    Assessment & Plan   Problem List Items Addressed This Visit    None     Visit Diagnoses     Encounter for supervision of other normal pregnancy in first trimester    -  Primary    Pre-diabetes               BMI:   Estimated body mass index is 28.9 kg/m  as calculated from the following:    Height as of 9/15/22: 1.575 m (5' 2\").    Weight as of 9/15/22: 71.7 kg (158 lb).     MAGALI Jernigan CNM  [facility]    Subjective   Dario is a 26 year old, presenting via telephone for the following health issues: Follow up viability/dating ultrasound    HPI     Dario is doing well. This was a surprise pregnancy and excited to be pregnant. They had been trying for 3 years and then had a miscarriage May 2022. After the miscarriage they decided they did not want to try anymore and ended up finding out she was pregnant. She is feeling well. Based on her LMP she is further along but does have long cycles and irregular periods. Will update dating based on ultrasound. She has no other questions or concerns today. Discussed her elevated A1C found last May, has not followed up with endocrinology as recommended. Discussed doing the 1 hour GCT at new OB visit.      Review of Systems   Constitutional, HEENT, cardiovascular, pulmonary, gi and gu systems are negative, except as otherwise noted.    Phone call duration: 6 minutes      "

## 2022-10-04 ENCOUNTER — TELEPHONE (OUTPATIENT)
Dept: OBGYN | Facility: CLINIC | Age: 27
End: 2022-10-04

## 2022-10-04 ENCOUNTER — MYC MEDICAL ADVICE (OUTPATIENT)
Dept: OBGYN | Facility: CLINIC | Age: 27
End: 2022-10-04

## 2022-10-04 NOTE — TELEPHONE ENCOUNTER
TC to Dario. Reports spotting has resolved. Feels some pressure on her right side, mild. RN reassured pt that minimal bleeding or spotting can be normal. Patient will continue to monitor sxs and contact clinic if symptoms worsen. O positive.  Rosemary Watson RN

## 2022-10-10 LAB
ABO/RH(D): NORMAL
ANTIBODY SCREEN: NEGATIVE
SPECIMEN EXPIRATION DATE: NORMAL

## 2022-10-11 ENCOUNTER — PRENATAL OFFICE VISIT (OUTPATIENT)
Dept: MIDWIFE SERVICES | Facility: CLINIC | Age: 27
End: 2022-10-11
Payer: COMMERCIAL

## 2022-10-11 VITALS
WEIGHT: 159.3 LBS | BODY MASS INDEX: 28.23 KG/M2 | DIASTOLIC BLOOD PRESSURE: 67 MMHG | OXYGEN SATURATION: 99 % | SYSTOLIC BLOOD PRESSURE: 100 MMHG | HEART RATE: 85 BPM | HEIGHT: 63 IN

## 2022-10-11 DIAGNOSIS — Z87.51 HISTORY OF PRETERM DELIVERY: ICD-10-CM

## 2022-10-11 DIAGNOSIS — Z11.8 SPECIAL SCREENING EXAMINATION FOR CHLAMYDIAL DISEASE: ICD-10-CM

## 2022-10-11 DIAGNOSIS — Z11.3 SCREENING FOR GONORRHEA: ICD-10-CM

## 2022-10-11 DIAGNOSIS — Z12.4 SCREENING FOR MALIGNANT NEOPLASM OF CERVIX: ICD-10-CM

## 2022-10-11 DIAGNOSIS — R05.1 ACUTE COUGH: ICD-10-CM

## 2022-10-11 DIAGNOSIS — O09.91 SUPERVISION OF HIGH-RISK PREGNANCY, FIRST TRIMESTER: Primary | ICD-10-CM

## 2022-10-11 LAB
ALBUMIN UR-MCNC: NEGATIVE MG/DL
APPEARANCE UR: CLEAR
BILIRUB UR QL STRIP: NEGATIVE
COLOR UR AUTO: YELLOW
ERYTHROCYTE [DISTWIDTH] IN BLOOD BY AUTOMATED COUNT: 13.4 % (ref 10–15)
GLUCOSE UR STRIP-MCNC: NEGATIVE MG/DL
HBA1C MFR BLD: 6.7 % (ref 0–5.6)
HCT VFR BLD AUTO: 37.2 % (ref 35–47)
HGB BLD-MCNC: 12.3 G/DL (ref 11.7–15.7)
HGB UR QL STRIP: NEGATIVE
KETONES UR STRIP-MCNC: ABNORMAL MG/DL
LEUKOCYTE ESTERASE UR QL STRIP: NEGATIVE
MCH RBC QN AUTO: 27.3 PG (ref 26.5–33)
MCHC RBC AUTO-ENTMCNC: 33.1 G/DL (ref 31.5–36.5)
MCV RBC AUTO: 83 FL (ref 78–100)
NITRATE UR QL: NEGATIVE
PH UR STRIP: 6 [PH] (ref 5–7)
PLATELET # BLD AUTO: 260 10E3/UL (ref 150–450)
RBC # BLD AUTO: 4.51 10E6/UL (ref 3.8–5.2)
SP GR UR STRIP: 1.01 (ref 1–1.03)
UROBILINOGEN UR STRIP-ACNC: 0.2 E.U./DL
WBC # BLD AUTO: 7.2 10E3/UL (ref 4–11)

## 2022-10-11 PROCEDURE — 99207 PR FIRST OB VISIT: CPT | Performed by: ADVANCED PRACTICE MIDWIFE

## 2022-10-11 PROCEDURE — U0005 INFEC AGEN DETEC AMPLI PROBE: HCPCS | Performed by: ADVANCED PRACTICE MIDWIFE

## 2022-10-11 PROCEDURE — 86901 BLOOD TYPING SEROLOGIC RH(D): CPT | Performed by: ADVANCED PRACTICE MIDWIFE

## 2022-10-11 PROCEDURE — G0145 SCR C/V CYTO,THINLAYER,RESCR: HCPCS | Performed by: ADVANCED PRACTICE MIDWIFE

## 2022-10-11 PROCEDURE — 86803 HEPATITIS C AB TEST: CPT | Performed by: ADVANCED PRACTICE MIDWIFE

## 2022-10-11 PROCEDURE — U0003 INFECTIOUS AGENT DETECTION BY NUCLEIC ACID (DNA OR RNA); SEVERE ACUTE RESPIRATORY SYNDROME CORONAVIRUS 2 (SARS-COV-2) (CORONAVIRUS DISEASE [COVID-19]), AMPLIFIED PROBE TECHNIQUE, MAKING USE OF HIGH THROUGHPUT TECHNOLOGIES AS DESCRIBED BY CMS-2020-01-R: HCPCS | Performed by: ADVANCED PRACTICE MIDWIFE

## 2022-10-11 PROCEDURE — 86780 TREPONEMA PALLIDUM: CPT | Performed by: ADVANCED PRACTICE MIDWIFE

## 2022-10-11 PROCEDURE — 87491 CHLMYD TRACH DNA AMP PROBE: CPT | Performed by: ADVANCED PRACTICE MIDWIFE

## 2022-10-11 PROCEDURE — 85027 COMPLETE CBC AUTOMATED: CPT

## 2022-10-11 PROCEDURE — 87086 URINE CULTURE/COLONY COUNT: CPT | Performed by: ADVANCED PRACTICE MIDWIFE

## 2022-10-11 PROCEDURE — 87389 HIV-1 AG W/HIV-1&-2 AB AG IA: CPT | Performed by: ADVANCED PRACTICE MIDWIFE

## 2022-10-11 PROCEDURE — 86762 RUBELLA ANTIBODY: CPT | Performed by: ADVANCED PRACTICE MIDWIFE

## 2022-10-11 PROCEDURE — 36415 COLL VENOUS BLD VENIPUNCTURE: CPT | Performed by: ADVANCED PRACTICE MIDWIFE

## 2022-10-11 PROCEDURE — 83036 HEMOGLOBIN GLYCOSYLATED A1C: CPT | Performed by: ADVANCED PRACTICE MIDWIFE

## 2022-10-11 PROCEDURE — 87591 N.GONORRHOEAE DNA AMP PROB: CPT | Performed by: ADVANCED PRACTICE MIDWIFE

## 2022-10-11 PROCEDURE — 86850 RBC ANTIBODY SCREEN: CPT | Performed by: ADVANCED PRACTICE MIDWIFE

## 2022-10-11 PROCEDURE — 81003 URINALYSIS AUTO W/O SCOPE: CPT | Performed by: ADVANCED PRACTICE MIDWIFE

## 2022-10-11 PROCEDURE — 86900 BLOOD TYPING SEROLOGIC ABO: CPT | Performed by: ADVANCED PRACTICE MIDWIFE

## 2022-10-11 PROCEDURE — 87340 HEPATITIS B SURFACE AG IA: CPT | Performed by: ADVANCED PRACTICE MIDWIFE

## 2022-10-11 NOTE — PROGRESS NOTES
9w3d  Dario Nathan is a 26 year old  with history of 34w4d delivery after PPROM with her last baby. She presents to the clinic for an new ob visit. She is not a previous CNM patient, has had both of her previous children with MD team. She reports spotting last week, no cramping. Had GDM with her first pregnancy, and elevated hgbA1C prior to pregnancy. She has had a cough/runny nose for a couple weeks. Had neg covid test at the onset of symptoms, hasn't tested again. Has had ongoing low back pain.  Estimated Date of Delivery: May 13, 2023 is calculated from 6 week U/S that was 3 weeks behind LMP dating.  Patient's last menstrual period was 2022.     She has had bleeding since her LMP.  Spotting 1 week ago, no cramping. Has had ongoing low back pain.   She has had mild nausea. Weigh loss has not occurred.   This was not a planned pregnancy.   FOB is involved,  [place]   OTHER CONCERNS: Elevated A1C prior to pregnancy. History of  birth after PPROM at 34w4d    INFECTION HISTORY  HIV: no  Hepatitis B: no  Hepatitis C: no  Syphilis:  no  Tuberculosis: no   PPD- no  Herpes self: yes  Herpes partner:  yes  Chlamydia:  no  Gonorrhea:  no  HPV: no  BV:  no  Trichomonis:  no  Chicken Pox:  NO-vaccinated  ====================================================  GENETIC SCREENING  Genetic screening reviewed. High Risk? none  ====================================================  PERSONAL/SOCIAL HISTORY  Lives lives with their family.  Employment: Full time.  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  HX OF ABUSE: no  =====================================================   REVIEW OF SYSTEMS  CONSTITUTIONAL: NEGATIVE for fever, chills  INTEGUMENTARY/SKIN: NEGATIVE for worrisome rashes, moles or lesions  EYES: NEGATIVE for vision changes   ENT/MOUTH: NEGATIVE for ear, mouth and throat problems  RESP:POSITIVE for cough-productive  CV: NEGATIVE for chest pain, palpitations   GI:  "POSITIVE for nausea  : NEGATIVE for frequency, dysuria, or hematuria  MUSCULOSKELETAL: NEGATIVE for significant arthralgias or myalgia  NEURO: POSITIVE for dizziness/lightheadedness  PSYCHIATRIC: NEGATIVE for changes in mood or affect  ====================================================    PHYSICAL EXAM:  /67   Pulse 85   Ht 1.6 m (5' 2.99\")   Wt 72.3 kg (159 lb 4.8 oz)   LMP 2022   SpO2 99%   BMI 28.23 kg/m    BMI- Body mass index is 28.23 kg/m .,     RECOMMENDED WEIGHT GAIN: 15-25 lbs.  PHQ9- Today's Depression Rating was 2  GENERAL:  Pleasant pregnant female, alert, well groomed.   SKIN:  Warm and dry, without lesions or rashes  HEAD: Symmetrical features.  NECK:  Thyroid without enlargement and nodules.  Lymph nodes not palpable.   LUNGS:  Clear to auscultation.  HEART:  RRR without murmur.  ABDOMEN: Soft without masses , tenderness or organomegaly.  No CVA tenderness. No scars noted.   FHT seen with BSUS  MUSCULOSKELETAL:  Full range of motion  EXTREMITIES:  No edema. No significant varicosities.     GENITALIA:  BUS WNL, no lesions noted   VAGINA:  Pink, normal rugae and discharge normal and physiologic  CERVIX:  smooth, without discharge or CMT and parous os,   firm/ closed, PAP taken.  GC/CT: pending  =========================================  ASSESSMENT:  12w3d,   (O09.91) Supervision of high-risk pregnancy, first trimester  (primary encounter diagnosis)    (Z12.4) Screening for malignant neoplasm of cervix  Plan: PAP imaged thin layer screen    (Z11.3) Screening for gonorrhea  Plan: NEISSERIA GONORRHOEA PCR    (Z11.8) Special screening examination for chlamydial disease  Plan: CHLAMYDIA TRACHOMATIS PCR    (R05.1) Acute cough  Plan: Symptomatic; Unknown COVID-19 Virus         (Coronavirus) by PCR Nose    (Z87.51) History of  delivery  Discussed Juhi/cervical lengths. She is undecided about [place]. Will continue to discuss.    PREGNANCY AT RISK? Yes-history of  " delivery  ==========================================  PLAN:  Instructed on use of triage nurse line and contacting the on call CNM after hours for an urgent need such as fever, vagina bleeding, bladder or vaginal infection, rupture of membranes,  or term labor.    Discussed the indications, uses for and false positives for quad screen, nuchal translucency and fetal survey ultrasound at 18-20 weeks gestation. She declines for now.  Discussed [employer] and cervical lengths for history of ptb. Will continue to discuss, as these don't start until 16w.  Instructed on best evidence for: weight gain for her BMI for pregnancy; healthy diet and foods to avoid; exercise and activity during pregnancy;avoiding exposure to toxoplasmosis; and maintenance of a generally healthy lifestyle.   Discussed the harms, benefits, side effects and alternative therapies for current prescribed and OTC medications.  History of HSV, will plan valtrex proph at 36w    John Campos CNM

## 2022-10-12 DIAGNOSIS — O24.111 PRE-EXISTING TYPE 2 DIABETES MELLITUS DURING PREGNANCY IN FIRST TRIMESTER: Primary | ICD-10-CM

## 2022-10-12 DIAGNOSIS — U07.1 INFECTION DUE TO 2019 NOVEL CORONAVIRUS: ICD-10-CM

## 2022-10-12 LAB
C TRACH DNA SPEC QL NAA+PROBE: NEGATIVE
HBV SURFACE AG SERPL QL IA: NONREACTIVE
HCV AB SERPL QL IA: NONREACTIVE
HIV 1+2 AB+HIV1 P24 AG SERPL QL IA: NONREACTIVE
N GONORRHOEA DNA SPEC QL NAA+PROBE: NEGATIVE
SARS-COV-2 RNA RESP QL NAA+PROBE: POSITIVE
T PALLIDUM AB SER QL: NONREACTIVE

## 2022-10-12 NOTE — PROGRESS NOTES
Telephone call to Dario to discuss her positive Covid test, and her elevated hgbA1C on her new OB labs.    Discussed that since she is 2 weeks out from onset of symptoms, likely will not benefit from antiviral medications at this point. Encouraged her to stay hydrated, and to call for any worsening shortness of breath, feeling lightheaded/dizzy, or fevers. Quarantine per CDC guidelines.    Also discussed HgbA1C on new OB labs (6.7) indicates type 2 diabetes. Recommend diabetic education to help manage BP in pregnancy. Order placed. Also ordered blood glucose monitoring supplies, and discussed checking fasting and 1 hour PP every day. Also discussed that she should be an MD patient for this pregnancy.    (O24.111) Pre-existing type 2 diabetes mellitus during pregnancy in first trimester  (primary encounter diagnosis)  Plan: AMB Adult Diabetes Educator Referral, blood         glucose (NO BRAND SPECIFIED) test strip, blood         glucose (NO BRAND SPECIFIED) lancets standard,         blood glucose monitoring (NO BRAND SPECIFIED)         meter device kit    (U07.1) Infection due to 2019 novel coronavirus      John Campos CNM

## 2022-10-13 ENCOUNTER — TELEPHONE (OUTPATIENT)
Dept: MIDWIFE SERVICES | Facility: CLINIC | Age: 27
End: 2022-10-13

## 2022-10-13 LAB
BKR LAB AP GYN ADEQUACY: NORMAL
BKR LAB AP GYN INTERPRETATION: NORMAL
BKR LAB AP HPV REFLEX: NORMAL
BKR LAB AP LMP: NORMAL
BKR LAB AP PREVIOUS ABNORMAL: NORMAL
PATH REPORT.COMMENTS IMP SPEC: NORMAL
PATH REPORT.COMMENTS IMP SPEC: NORMAL
PATH REPORT.RELEVANT HX SPEC: NORMAL
RUBV IGG SERPL QL IA: 3.66 INDEX
RUBV IGG SERPL QL IA: POSITIVE

## 2022-10-13 NOTE — TELEPHONE ENCOUNTER
Diabetes Education Scheduling Outreach #1:    Call to patient to schedule. Left message with phone number to call to schedule.    Plan for 2nd outreach attempt within 1 business day.    Gudelia Christian OnCall  Diabetes and Nutrition Scheduling

## 2022-10-14 LAB — BACTERIA UR CULT: NO GROWTH

## 2022-10-20 ENCOUNTER — VIRTUAL VISIT (OUTPATIENT)
Dept: EDUCATION SERVICES | Facility: CLINIC | Age: 27
End: 2022-10-20
Payer: COMMERCIAL

## 2022-10-20 DIAGNOSIS — O24.111 PRE-EXISTING TYPE 2 DIABETES MELLITUS DURING PREGNANCY IN FIRST TRIMESTER: Primary | ICD-10-CM

## 2022-10-20 DIAGNOSIS — O24.111 PRE-EXISTING TYPE 2 DIABETES MELLITUS DURING PREGNANCY IN FIRST TRIMESTER: ICD-10-CM

## 2022-10-20 PROCEDURE — G0108 DIAB MANAGE TRN  PER INDIV: HCPCS | Mod: TEL

## 2022-10-20 NOTE — PROGRESS NOTES
Diabetes Self-Management Education & Support    Presents for: Initial Assessment for new diagnosis  Telephone visit    ASSESSMENT:  Newly diagnosed with type 2 diabetes as well as new pregnancy, currently 10 weeks gestation.     Opportunities for ongoing education and support in diabetes-self management were discussed.    Pt verbalized understanding of concepts discussed and recommendations provided today.       INTERVENTION:  Patient presented for Type 2 Diabetes. Reviewed carbohydrate counting, label reading, and meal plan of 30-45 grams carb at breakfast, 45-60 grams of carb at lunch, 45-60 grams of carb at dinner, and 15-30 grams of carb at snacks. Encouraged balanced meals, including protein and fat with carb at all meals and snacks. and n    Insulin administration technique taught today. Patient verbalized understanding and was able to perform an accurate return demonstration of administration technique.    Referral to care team: Endocrinologist      Education Materials Provided:  GDM education materials emailed      PLAN:  Start testing fasting and 2 hours after meals (for simplicity, will likely need meal time insulin soon)  Start 14 units Levemir - sending OB request in separate encounter  Follow up MyChart 10/25, phone 10/31    See Care Plan for co-developed, patient-stated behavior change goals.    AVS printed and provided to patient today.    SUBJECTIVE/OBJECTIVE:  Presents for: Initial Assessment for new diagnosis  Accompanied by: Self  Diabetes education in the past 24mo: No  Focus of Visit: Monitoring, Healthy Eating  Diabetes type: Type 2 (and pregnant)  Other concerns:: None  Cultural Influences/Ethnic Background:  Choose not to answer    Diabetes Symptoms & Complications          Patient Problem List and Family Medical History reviewed for relevant medical history, current medical status, and diabetes risk factors.    Vitals:  LMP 07/16/2022     Wt Readings from Last 4 Encounters:   10/11/22 72.3  kg (159 lb 4.8 oz)   09/15/22 71.7 kg (158 lb)   05/18/22 71 kg (156 lb 8 oz)   10/08/19 72.4 kg (159 lb 9.6 oz)     Estimated Date of Delivery: May 13, 2023    Labs:  Lab Results   Component Value Date    A1C 6.7 10/11/2022    A1C 5.8 03/19/2012     No results found for: GLC  Lab Results   Component Value Date     10/17/2018     HDL Cholesterol   Date Value Ref Range Status   10/17/2018 56 >49 mg/dL Final   ]  No results found for: GFRESTIMATED  No results found for: GFRESTBLACK  No results found for: CR  No results found for: MICROALBUMIN    Last 3 BP:   BP Readings from Last 3 Encounters:   10/11/22 100/67   05/18/22 109/63   10/08/19 106/66       History   Smoking Status     Never   Smokeless Tobacco     Never       Healthy Eating  Meal planning/habits: None  Meals include: Breakfast, Lunch, Dinner    Being Active  Being Active Assessed Today: No    Monitoring  Monitoring Assessed Today: Yes    Just started testing   Blood Glucose/Ketone Log:    Date Fasting Post Breakfast Post Lunch Post Supper   10/18 119 - 139*  (oatmeal) 142* (2.5 hours)  (rice, shrimp)   10/19 119 96 (3 hour reading)  (1/2 omelette, 1/4 crepe) 166*  (PBJ sandwich) -  (chicken and rice)   10/20 124            Checking for urine ketones? no    Taking Medications      Any changes to above medications since becoming pregnant: no    Current Treatments: None    Problem Solving                 Reducing Risks       Healthy Coping  Emotional response to diabetes: Ready to learn  Stage of change: PREPARATION (Decided to change - considering how)    Patient Activation Measure Survey Score:  No flowsheet data found.      JOSE Baires [facility]    Time Spent: 60 minutes  Encounter Type: Individual    Any diabetes medication dose changes were made via the CDE Protocol per the patient's OB/GYN provider. A copy of this encounter was shared with the provider.

## 2022-10-20 NOTE — TELEPHONE ENCOUNTER
Patient with fasting blood sugars 119-125, would recommend:    14 units (0.2u/kg/day) Levemir daily    Please sign pended orders if you are in agreement  Thank you!  JOSE [facility]aires [facility]

## 2022-10-21 NOTE — TELEPHONE ENCOUNTER
Patient to begin insulin regimen and continued communication with diabetic education team for management of her GDM.  John Campos CNM

## 2022-10-28 DIAGNOSIS — O24.111 PRE-EXISTING TYPE 2 DIABETES MELLITUS DURING PREGNANCY IN FIRST TRIMESTER: ICD-10-CM

## 2022-10-31 ENCOUNTER — VIRTUAL VISIT (OUTPATIENT)
Dept: EDUCATION SERVICES | Facility: CLINIC | Age: 27
End: 2022-10-31
Payer: COMMERCIAL

## 2022-10-31 DIAGNOSIS — O24.111 PRE-EXISTING TYPE 2 DIABETES MELLITUS DURING PREGNANCY IN FIRST TRIMESTER: Primary | ICD-10-CM

## 2022-10-31 PROCEDURE — 98967 PH1 ASSMT&MGMT NQHP 11-20: CPT | Mod: TEL

## 2022-10-31 NOTE — PROGRESS NOTES
Diabetes and Pregnancy Follow-up  Type of Service: Telephone Visit    How would patient like to obtain AVS? Not needed    Subjective/Objective:    Dario Nathan was called for a scheduled BG review. Last date of communication was: 10/20/2022.    Gestational diabetes is being managed with diet, activity and medications    Taking diabetes medications:   yes:     Diabetes Medication(s)     Insulin       insulin detemir (LEVEMIR PEN) 100 UNIT/ML pen    Inject 14 Units Subcutaneous At Bedtime          Estimated Date of Delivery: May 13, 2023    Blood Glucose/Ketone Log:    Date Ketones Fasting Post Breakfast Post Lunch Post Supper   10/26  92  118* 150*   10/27        10/28   119* 88* 142*   10/29   86*  123*   10/30  106 119*  96*   10/31            Assessment:    Ketones: na.   Fasting blood glucoses: 50% in target.  After breakfast: 100% in target.  After lunch: 100% in target.  After dinner: 25% in target.    Dario has not picked up insulin - stated that she called the first day the prescription was written, and the tech said some approvals were still needed and they would call back, but never did. This writer called [employer], and prescription can be ready today (they state it was shelved because she didn't pick it up in 14 days). She has been really restricting carbs to improve blood sugars (no tortillas, beans, rice), but encouraged her to return to meal plan so that we can assess her true blood sugar patterns.     Plan/Response:  Recommend that patient begin 14 units levemir insulin.  Follow up 1 week    JOSE Baires [facility]  Time Spent: 14:37 minutes    Any diabetes medication dose changes were made via the CDE Protocol and Collaborative Practice Agreement with the patient's OB/GYN provider. A copy of this encounter was shared with the provider.

## 2022-11-02 ENCOUNTER — TELEPHONE (OUTPATIENT)
Dept: EDUCATION SERVICES | Facility: CLINIC | Age: 27
End: 2022-11-02

## 2022-11-02 NOTE — TELEPHONE ENCOUNTER
Central Prior Authorization Team   Phone: [phone]      PA Initiation    Medication: insulin detemir (LEVEMIR PEN) 100 UNIT/ML pen - INITIATED  Insurance Company: [employer] - Phone [phone] Fax [phone]  Pharmacy Filling the Rx: [id number] - [place], [place] - [address]  Filling Pharmacy Phone: [phone]  Filling Pharmacy Fax:    Start Date: 11/2/2022

## 2022-11-02 NOTE — CONFIDENTIAL NOTE
Hello prior [facility] team,     This patient needs an urgent prior authorization for her Levemir.     Thanks,   Danna Rico RD [facility]

## 2022-11-02 NOTE — CONFIDENTIAL NOTE
Dario called diabetes triage line stating that she is having a hard time getting her insulin from the pharmacy, she states that she called the pharmacy and her insurance company and they told her that the insulin needs a PA.     Pharmacy admin for insurance plan. [phone]    Erwin called [facility] who explained that the the Levemir does need a PA, routed to PA team to initiate.     Called patient to let her know that PA was initiated.     Danna Rico RD [facility]

## 2022-11-03 NOTE — TELEPHONE ENCOUNTER
Prior Authorization Approval    Authorization Effective Date: 10/2/2022  Authorization Expiration Date: 11/2/2025  Medication: insulin detemir (LEVEMIR PEN) 100 UNIT/ML pen - PA APPROVED  Insurance Company: [employer] - Phone [phone] Fax [phone]  Which Pharmacy is filling the prescription (Not needed for infusion/clinic administered): [id number] - [place], [place] - [address]  Pharmacy Notified: Yes  Patient Notified: Yes (pharmacy will notify patient when ready)

## 2022-11-07 ENCOUNTER — VIRTUAL VISIT (OUTPATIENT)
Dept: EDUCATION SERVICES | Facility: CLINIC | Age: 27
End: 2022-11-07
Payer: COMMERCIAL

## 2022-11-07 DIAGNOSIS — O24.111 PRE-EXISTING TYPE 2 DIABETES MELLITUS DURING PREGNANCY IN FIRST TRIMESTER: Primary | ICD-10-CM

## 2022-11-07 PROCEDURE — G0108 DIAB MANAGE TRN  PER INDIV: HCPCS | Mod: TEL

## 2022-11-07 NOTE — LETTER
11/7/2022         RE: Dario Nathan  [address] [facility] [address]        Dear Colleague,    Thank you for referring your patient, Dario Nathan, to [facility]. Please see a copy of my visit note below.    Diabetes and Pregnancy Follow-up    Type of Service: Telephone Visit/ 30 minutes     Originating Location (Patient Location): Home  Distant Location (Provider Location): [place] [facility]  Mode of Communication:  Telephone     Telephone Visit Start Time: 1:02 PM  Telephone Visit End Time (telephone visit stop time): 1:32 PM     How would patient like to obtain AVS? MyChart      Subjective/Objective:    Dario Nathan was called for a scheduled BG review. Last date of communication was: 10/31/22.    Type 2 diabetes in pregnancy is being managed with diet and medications    Taking diabetes medications:   yes:     Diabetes Medication(s)     Insulin       insulin detemir (LEVEMIR PEN) 100 UNIT/ML pen    Inject 14 Units Subcutaneous At Bedtime       Problems taking diabetes medications regularly? No (takes 11 PM - 12 AM) , Side effects? No     Estimated Date of Delivery: May 13, 2023    Blood Glucose/Ketone Log:    Date Ketones Fasting Post Breakfast Post Lunch Post Supper   11/1  95 93 151    11/2  99 182 91    11/3  94 114 105    11/4  85  92 110   11/5  79 110 181    11/6     119   11/7  93 119         Assessment:  Ketones: not currently monitoring  Fasting blood glucoses: 83% in target.  After breakfast: 80% in target.  Before lunch: n/a% in target.  After lunch: 60% in target.  Before dinner: n/a% in target.  After dinner: 100% in target.    Started Levemir insulin on Friday, 11/4/22. Patient reports since beginning insulin she has been able to add back in carbohydrates.  Anticipate elevated post meal glucoses due to large portions of carbohydrates at those meals as reported by patient.  Patient reports she may go several hours between meals/snacks mostly due to her work  schedule. Works evenings (2:30-11 PM) and sleeps from 12:30 AM until 10 AM - 12 PM.  Majority of visit today spent reviewing food/meal planning with an emphasis on regular meals and snacks.  Provided lots of simple snack ideas to bring with her to work. See below for detailed recommendations provided to patient in AVS.    Plan/Response:  1. Test glucose 4 times per day:   Fasting (when you first awake for the day): 95 mg/dL or below   1 hour after breakfast: 140 mg/dL or below   1 hour after lunch: 140 mg/dL or below   1 hour after dinner: 140 mg/dL or below     Please bring your meter and log book to all appointments    2.  Meal Plan    Breakfast (10 AM - 12 PM): 30 grams carbohydrate + protein   Lunch (1-2 PM): 45-60 grams carbohydrate + protein  Snack (4 PM): 15-30 grams carbohydrate + protein  Snack (6-7 PM OR 9-10 PM): 15-30 grams carbohydrate + protein  Dinner (9-10 PM OR 6-7 PM): 45-60 grams carbohydrate + protein  Snack (12 AM): 15-30 grams carbohydrate + protein    A few tips:   -consume some carbohydrate every 2-3 hours while awake   -you need a minimum of 175 grams of carbohydrate per day   -fruit and cold breakfast cereal are best tolerated at lunch or later   -protein includes: cheese, eggs, fish, nuts, nut butter, chicken, turkey, beef, and pork   -snack ideas: an individual container of Greek yogurt (try Chobani Less Sugar), whole grain crackers and cheese, chocolate fairlife milk, a Kashi or KIND bar, a baseball size piece of whole fruit + nut butter (apple + peanut butter), fruit canned in it's own juice + cottage cheese    4.  Aim for 20-30 minutes of activity most days of the week.      5.  Continue Levemir 14 units once a day at bedtime.     6.  Follow up: Monday, November 14th at 9 AM with Carmen    7.  Call Diabetes Education at [phone] or send a [employer] message with:   -questions or concerns   -any low blood sugars   -3 or more blood sugars above target in a 7 day period    Carmen Gipson,  MPH, RD, ESTEBAN, LD 11/7/2022    Time Spent: 30 minutes    Any diabetes medication dose changes were made via the CDE Protocol and Collaborative Practice Agreement with the patient's referring provider and OB/GYN provider. A copy of this encounter was shared with the provider.

## 2022-11-07 NOTE — LETTER
11/7/2022         RE: Dario Nathan  [address] [facility] [address]        Dear Colleague,    Thank you for referring your patient, Dario Nathan, to [facility]. Please see a copy of my visit note below.    Diabetes and Pregnancy Follow-up  Type of Service: Telephone Visit  Type of Service: Telephone Visit/ ***minutes     Originating Location (Patient Location): Home  Distant Location (Provider Location): ***  Mode of Communication:  Telephone     Telephone Visit Start Time: 1:02 PM  Telephone Visit End Time (telephone visit stop time): 1:32 PM     How would patient like to obtain AVS? MyChart***      How would patient like to obtain AVS? Not needed    Subjective/Objective:    Dario Nathan was called for a scheduled BG review. Last date of communication was: ***.    Gestational diabetes is being managed with {GDM management:116272}    Taking diabetes medications:   yes:     Diabetes Medication(s)     Insulin       insulin detemir (LEVEMIR PEN) 100 UNIT/ML pen    Inject 14 Units Subcutaneous At Bedtime       Problems taking diabetes medications regularly? No (takes 11 PM - 12 AM) , Side effects? No     Estimated Date of Delivery: May 13, 2023    Blood Glucose/Ketone Log:    Date Ketones Fasting Post Breakfast Post Lunch Post Supper   11/1  95 93 151    11/2  99 182 91    11/3  94 114 105    11/4  85  92 110   11/5  79 110 181    11/6     119   11/7  93 119 ***    Breakfast AM: hashbrown bites, egg, pancake, small amt syrup    Works 2:30 PM - 11 PM  To Bed: 12:30 AM   Awake 10 AM - 12 PM     1st Meal: 10 AM - 12 PM  2nd Meal: 2 PM    Snack 4-5 pm    7-8 PM: meal or snack      Started insulin on Friday.   Has been able to add CHO back in.     Assessment:    Ketones: ***.   Fasting blood glucoses: ***% in target.  After breakfast: ***% in target.  Before lunch: ***% in target.  After lunch: ***% in target.  Before dinner: ***% in target.  After dinner: ***% in  target.    Plan/Response:  {GDM plan:482829}    ***  Time Spent: *** minutes    Any diabetes medication dose changes were made via the CDE Protocol and Collaborative Practice Agreement with the patient's {diabetes education provider list:105428}. A copy of this encounter was shared with the provider.        Diabetes and Pregnancy Follow-up  Type of Service: Telephone Visit  Type of Service: Telephone Visit/ 30 minutes     Originating Location (Patient Location): Home  [place] [facility]  Mode of Communication:  Telephone     Telephone Visit Start Time: 1:02 PM  Telephone Visit End Time (telephone visit stop time): 1:32 PM     How would patient like to obtain AVS? MyChart      Subjective/Objective:    Dario Nathan was called for a scheduled BG review. Last date of communication was: 10/31/22.    Type 2 diabetes in pregnancy is being managed with diet and medications    Taking diabetes medications:   yes:     Diabetes Medication(s)     Insulin       insulin detemir (LEVEMIR PEN) 100 UNIT/ML pen    Inject 14 Units Subcutaneous At Bedtime       Problems taking diabetes medications regularly? No (takes 11 PM - 12 AM) , Side effects? No     Estimated Date of Delivery: May 13, 2023    Blood Glucose/Ketone Log:    Date Ketones Fasting Post Breakfast Post Lunch Post Supper   11/1  95 93 151    11/2  99 182 91    11/3  94 114 105    11/4  85  92 110   11/5  79 110 181    11/6     119   11/7  93 119         Assessment:  Ketones: not currently monitoring  Fasting blood glucoses: 83% in target.  After breakfast: 80% in target.  Before lunch: n/a% in target.  After lunch: 60% in target.  Before dinner: n/a% in target.  After dinner: 100% in target.    Started Levemir insulin on Friday, 11/4/22. Patient reports since beginning insulin she has been able to add back in carbohydrates.  Anticipate elevated post meal glucoses due to large portions of carbohydrates at those meals as reported by  patient.  Patient reports she may go several hours between meals/snacks mostly due to her work schedule. Works evenings (2:30-11 PM) and sleeps from 12:30 AM until 10 AM - 12 PM.  Majority of visit today spent reviewing food/meal planning with an emphasis on regular meals and snacks.  Provided lots of simple snack ideas to bring with her to work. See below for detailed recommendations provided to patient in AVS.    Plan/Response:  1. Test glucose 4 times per day:   Fasting (when you first awake for the day): 95 mg/dL or below   1 hour after breakfast: 140 mg/dL or below   1 hour after lunch: 140 mg/dL or below   1 hour after dinner: 140 mg/dL or below     Please bring your meter and log book to all appointments    2.  Meal Plan    Breakfast (10 AM - 12 PM): 30 grams carbohydrate + protein   Lunch (1-2 PM): 45-60 grams carbohydrate + protein  Snack (4 PM): 15-30 grams carbohydrate + protein  Snack (6-7 PM OR 9-10 PM): 15-30 grams carbohydrate + protein  Dinner (9-10 PM OR 6-7 PM): 45-60 grams carbohydrate + protein  Snack (12 AM): 15-30 grams carbohydrate + protein    A few tips:   -consume some carbohydrate every 2-3 hours while awake   -you need a minimum of 175 grams of carbohydrate per day   -fruit and cold breakfast cereal are best tolerated at lunch or later   -protein includes: cheese, eggs, fish, nuts, nut butter, chicken, turkey, beef, and pork   -snack ideas: an individual container of Greek yogurt (try Chobani Less Sugar), whole grain crackers and cheese, chocolate fairlife milk, a Kashi or KIND bar, a baseball size piece of whole fruit + nut butter (apple + peanut butter), fruit canned in it's own juice + cottage cheese    4.  Aim for 20-30 minutes of activity most days of the week.      5.  Continue Levemir 14 units once a day at bedtime.     6.  Follow up: Monday, November 14th at 9 AM with Carmen    7.  Call Diabetes Education at [phone] or send a [employer] message with:   -questions or  concerns   -any low blood sugars   -3 or more blood sugars above target in a 7 day period    Carmen Gipson, MPH, RD, CDCES, LD 11/7/2022    Time Spent: 30 minutes    Any diabetes medication dose changes were made via the CDE Protocol and Collaborative Practice Agreement with the patient's referring provider and OB/GYN provider. A copy of this encounter was shared with the provider.

## 2022-11-07 NOTE — PROGRESS NOTES
Diabetes and Pregnancy Follow-up    Type of Service: Telephone Visit/ 30 minutes     Originating Location (Patient Location): Home  Distant Location (Provider Location): [place] [facility]  Mode of Communication:  Telephone     Telephone Visit Start Time: 1:02 PM  Telephone Visit End Time (telephone visit stop time): 1:32 PM     How would patient like to obtain AVS? Giovany      Subjective/Objective:    Dario Nathan was called for a scheduled BG review. Last date of communication was: 10/31/22.    Type 2 diabetes in pregnancy is being managed with diet and medications    Taking diabetes medications:   yes:     Diabetes Medication(s)     Insulin       insulin detemir (LEVEMIR PEN) 100 UNIT/ML pen    Inject 14 Units Subcutaneous At Bedtime       Problems taking diabetes medications regularly? No (takes 11 PM - 12 AM) , Side effects? No     Estimated Date of Delivery: May 13, 2023    Blood Glucose/Ketone Log:    Date Ketones Fasting Post Breakfast Post Lunch Post Supper   11/1  95 93 151    11/2  99 182 91    11/3  94 114 105    11/4  85  92 110   11/5  79 110 181    11/6     119   11/7  93 119         Assessment:  Ketones: not currently monitoring  Fasting blood glucoses: 83% in target.  After breakfast: 80% in target.  Before lunch: n/a% in target.  After lunch: 60% in target.  Before dinner: n/a% in target.  After dinner: 100% in target.    Started Levemir insulin on Friday, 11/4/22. Patient reports since beginning insulin she has been able to add back in carbohydrates.  Anticipate elevated post meal glucoses due to large portions of carbohydrates at those meals as reported by patient.  Patient reports she may go several hours between meals/snacks mostly due to her work schedule. Works evenings (2:30-11 PM) and sleeps from 12:30 AM until 10 AM - 12 PM.  Majority of visit today spent reviewing food/meal planning with an emphasis on regular meals and snacks.  Provided lots of simple snack ideas to bring with her to  work. See below for detailed recommendations provided to patient in AVS.    Plan/Response:  1. Test glucose 4 times per day:   Fasting (when you first awake for the day): 95 mg/dL or below   1 hour after breakfast: 140 mg/dL or below   1 hour after lunch: 140 mg/dL or below   1 hour after dinner: 140 mg/dL or below     Please bring your meter and log book to all appointments    2.  Meal Plan    Breakfast (10 AM - 12 PM): 30 grams carbohydrate + protein   Lunch (1-2 PM): 45-60 grams carbohydrate + protein  Snack (4 PM): 15-30 grams carbohydrate + protein  Snack (6-7 PM OR 9-10 PM): 15-30 grams carbohydrate + protein  Dinner (9-10 PM OR 6-7 PM): 45-60 grams carbohydrate + protein  Snack (12 AM): 15-30 grams carbohydrate + protein    A few tips:   -consume some carbohydrate every 2-3 hours while awake   -you need a minimum of 175 grams of carbohydrate per day   -fruit and cold breakfast cereal are best tolerated at lunch or later   -protein includes: cheese, eggs, fish, nuts, nut butter, chicken, turkey, beef, and pork   -snack ideas: an individual container of Greek yogurt (try Chobani Less Sugar), whole grain crackers and cheese, chocolate fairlife milk, a Kashi or KIND bar, a baseball size piece of whole fruit + nut butter (apple + peanut butter), fruit canned in it's own juice + cottage cheese    4.  Aim for 20-30 minutes of activity most days of the week.      5.  Continue Levemir 14 units once a day at bedtime.     6.  Follow up: Monday, November 14th at 9 AM with Carmen    7.  Call Diabetes Education at [phone] or send a [employer] message with:   -questions or concerns   -any low blood sugars   -3 or more blood sugars above target in a 7 day period    Carmen Gipson, MPH, RD, CDCES, LD 11/7/2022    Time Spent: 30 minutes    Any diabetes medication dose changes were made via the CDE Protocol and Collaborative Practice Agreement with the patient's referring provider and OB/GYN provider. A copy of this encounter  was shared with the provider.

## 2022-11-07 NOTE — PATIENT INSTRUCTIONS
Test glucose 4 times per day:   Fasting (when you first awake for the day): 95 mg/dL or below   1 hour after breakfast: 140 mg/dL or below   1 hour after lunch: 140 mg/dL or below   1 hour after dinner: 140 mg/dL or below     Please bring your meter and log book to all appointments    2.  Meal Plan    Breakfast (10 AM - 12 PM): 30 grams carbohydrate + protein   Lunch (1-2 PM): 45-60 grams carbohydrate + protein  Snack (4 PM): 15-30 grams carbohydrate + protein  Snack (6-7 PM OR 9-10 PM): 15-30 grams carbohydrate + protein  Dinner (9-10 PM OR 6-7 PM): 45-60 grams carbohydrate + protein  Snack (12 AM): 15-30 grams carbohydrate + protein    A few tips:   -consume some carbohydrate every 2-3 hours while awake   -you need a minimum of 175 grams of carbohydrate per day   -fruit and cold breakfast cereal are best tolerated at lunch or later   -protein includes: cheese, eggs, fish, nuts, nut butter, chicken, turkey, beef, and pork   -snack ideas: an individual container of Greek yogurt (try Chobani Less Sugar), whole grain crackers and cheese, chocolate fairlife milk, a Kashi or KIND bar, a baseball size piece of whole fruit + nut butter (apple + peanut butter), fruit canned in it's own juice + cottage cheese    4.  Aim for 20-30 minutes of activity most days of the week.      5.  Continue Levemir 14 units once a day at bedtime.     6.  Follow up: Monday, November 14th at 9 AM with Carmen    7.  Call Diabetes Education at [phone] or send a [employer] message with:   -questions or concerns   -any low blood sugars   -3 or more blood sugars above target in a 7 day period

## 2022-11-10 ENCOUNTER — TRANSCRIBE ORDERS (OUTPATIENT)
Dept: MATERNAL FETAL MEDICINE | Facility: CLINIC | Age: 27
End: 2022-11-10

## 2022-11-10 ENCOUNTER — PRENATAL OFFICE VISIT (OUTPATIENT)
Dept: MIDWIFE SERVICES | Facility: CLINIC | Age: 27
End: 2022-11-10
Payer: COMMERCIAL

## 2022-11-10 VITALS
OXYGEN SATURATION: 98 % | WEIGHT: 158 LBS | SYSTOLIC BLOOD PRESSURE: 104 MMHG | HEART RATE: 65 BPM | BODY MASS INDEX: 28 KG/M2 | DIASTOLIC BLOOD PRESSURE: 65 MMHG

## 2022-11-10 DIAGNOSIS — Z34.81 ENCOUNTER FOR SUPERVISION OF OTHER NORMAL PREGNANCY, FIRST TRIMESTER: Primary | ICD-10-CM

## 2022-11-10 DIAGNOSIS — E11.9 TYPE 2 DIABETES MELLITUS WITHOUT COMPLICATION, WITHOUT LONG-TERM CURRENT USE OF INSULIN (H): ICD-10-CM

## 2022-11-10 DIAGNOSIS — O26.90 PREGNANCY RELATED CONDITION, ANTEPARTUM: Primary | ICD-10-CM

## 2022-11-10 PROCEDURE — 99207 PR PRENATAL VISIT: CPT | Performed by: ADVANCED PRACTICE MIDWIFE

## 2022-11-10 NOTE — PROGRESS NOTES
13w5d  Difficulty finding FHTs by casper. BSUS used to see fetal cardiac activity and very distended bladder. Seeing DBE and has been on insulin. Feels fatigued and has frequent HAs. Discussed measures to treat HAs in pregnancy, reassured that it is not a SE of insulin. Discussed that because of her pre-existing DM, she would need to see the OB/GYN team for her care. Referral sent to [facility] for level 2 and monitoring re to DM. Having some trouble feeling satiated with current diet- has not gained weight. Recommend discussing with DE to see what snacks would be helpful to add. RTC in 4 wks SHARON

## 2022-11-15 ENCOUNTER — TELEPHONE (OUTPATIENT)
Dept: MIDWIFE SERVICES | Facility: CLINIC | Age: 27
End: 2022-11-15

## 2022-11-15 NOTE — TELEPHONE ENCOUNTER
Prior Authorization Retail Medication Request    Medication/Dose:   ICD code (if different than what is on RX):  iinsulin detemir (LEVEMIR PEN) 100 UNIT/ML pennsulin detemir (LEVEMIR PEN) 100 UNIT/ML pen  Previously Tried and Failed:    Rationale:      Insurance Name:    Insurance ID:        Pharmacy Information (if different than what is on RX)  Name:  [facility]  Phone:  [phone]

## 2022-11-16 NOTE — TELEPHONE ENCOUNTER
Prior Authorization Not Needed per Insurance Pharmacy has paid claim 11/03/2022     Medication: Levemir Flextouch Pen PA NOT NEEDED  Insurance Company: [employer] ([facility]) - Phone [phone] Fax [phone]  Expected CoPay:      Pharmacy Filling the Rx: [id number] - [place], [place] - [address]  Pharmacy Notified: Yes  Patient Notified: No

## 2022-12-02 ENCOUNTER — TELEPHONE (OUTPATIENT)
Dept: EDUCATION SERVICES | Facility: CLINIC | Age: 27
End: 2022-12-02

## 2022-12-02 NOTE — TELEPHONE ENCOUNTER
Second attempt made to reach patient for rescheduling.  There was no answer so message was left requesting return call.  Updating referring provider.  No further attempts to reach patient will be made at this time.  ESTEBAN team available when patient is ready.   Carmen Gipson, MPH, RD, CDCES, LD 12/2/2022

## 2022-12-02 NOTE — TELEPHONE ENCOUNTER
Patient has cancelled last 3 diabetes education appointments (11/14, 11/21, and 11/28).  No additional appointments with diabetes education scheduled at this time. Has not returned call to scheduling from message left on 11/14/22 to reschedule appointments.  Call placed to patient today to discuss and assist in scheduling.  There was no answer so message was left requesting a return call. Also sent patient a [employer] message asking her to call scheduling to reschedule.   Carmen Gipson, MPH, RD, CDCES, LD 12/2/2022

## 2022-12-05 ENCOUNTER — PRENATAL OFFICE VISIT (OUTPATIENT)
Dept: OBGYN | Facility: CLINIC | Age: 27
End: 2022-12-05
Payer: COMMERCIAL

## 2022-12-05 VITALS
HEART RATE: 68 BPM | BODY MASS INDEX: 27.82 KG/M2 | OXYGEN SATURATION: 100 % | WEIGHT: 157 LBS | SYSTOLIC BLOOD PRESSURE: 92 MMHG | DIASTOLIC BLOOD PRESSURE: 53 MMHG

## 2022-12-05 DIAGNOSIS — O09.92 SUPERVISION OF HIGH-RISK PREGNANCY, SECOND TRIMESTER: Primary | ICD-10-CM

## 2022-12-05 LAB
ALBUMIN MFR UR ELPH: 30.9 MG/DL
ALT SERPL W P-5'-P-CCNC: <5 U/L (ref 10–35)
AST SERPL W P-5'-P-CCNC: 14 U/L (ref 10–35)
CREAT SERPL-MCNC: 0.5 MG/DL (ref 0.51–0.95)
CREAT UR-MCNC: 134 MG/DL
ERYTHROCYTE [DISTWIDTH] IN BLOOD BY AUTOMATED COUNT: 13.5 % (ref 10–15)
GFR SERPL CREATININE-BSD FRML MDRD: >90 ML/MIN/1.73M2
HCT VFR BLD AUTO: 33.8 % (ref 35–47)
HGB BLD-MCNC: 11.4 G/DL (ref 11.7–15.7)
MCH RBC QN AUTO: 27.7 PG (ref 26.5–33)
MCHC RBC AUTO-ENTMCNC: 33.7 G/DL (ref 31.5–36.5)
MCV RBC AUTO: 82 FL (ref 78–100)
PLATELET # BLD AUTO: 277 10E3/UL (ref 150–450)
PROT/CREAT 24H UR: 0.23 MG/MG CR (ref 0–0.2)
RBC # BLD AUTO: 4.11 10E6/UL (ref 3.8–5.2)
URATE SERPL-MCNC: 2.6 MG/DL (ref 2.4–5.7)
WBC # BLD AUTO: 7.9 10E3/UL (ref 4–11)

## 2022-12-05 PROCEDURE — 84550 ASSAY OF BLOOD/URIC ACID: CPT | Performed by: OBSTETRICS & GYNECOLOGY

## 2022-12-05 PROCEDURE — 99207 PR PRENATAL VISIT: CPT | Performed by: OBSTETRICS & GYNECOLOGY

## 2022-12-05 PROCEDURE — 84450 TRANSFERASE (AST) (SGOT): CPT | Performed by: OBSTETRICS & GYNECOLOGY

## 2022-12-05 PROCEDURE — 36415 COLL VENOUS BLD VENIPUNCTURE: CPT | Performed by: OBSTETRICS & GYNECOLOGY

## 2022-12-05 PROCEDURE — 84156 ASSAY OF PROTEIN URINE: CPT | Performed by: OBSTETRICS & GYNECOLOGY

## 2022-12-05 PROCEDURE — 84460 ALANINE AMINO (ALT) (SGPT): CPT | Performed by: OBSTETRICS & GYNECOLOGY

## 2022-12-05 PROCEDURE — 82105 ALPHA-FETOPROTEIN SERUM: CPT | Mod: 90 | Performed by: OBSTETRICS & GYNECOLOGY

## 2022-12-05 PROCEDURE — 85027 COMPLETE CBC AUTOMATED: CPT | Performed by: OBSTETRICS & GYNECOLOGY

## 2022-12-05 PROCEDURE — 82565 ASSAY OF CREATININE: CPT | Performed by: OBSTETRICS & GYNECOLOGY

## 2022-12-05 PROCEDURE — 99000 SPECIMEN HANDLING OFFICE-LAB: CPT | Performed by: OBSTETRICS & GYNECOLOGY

## 2022-12-05 NOTE — PROGRESS NOTES
Return OB visit    Subjective:  Patient reports she is starting to feel fetal movement, no vaginal bleeding or leaking fluid. She denies contractions. She has no concerns today. The diabetes education team tried multiple times to call her to schedule follow up but she did not call back. She reports that she still has their phone number and is willing to call and schedule. She reports that her blood sugars have been in the 80-90s for fasting and 100-120 for her 2 hr post-prandial number since starting Levemir at bedtime.        Objective:  BP 92/53   Pulse 68   Wt 71.2 kg (157 lb)   LMP 2022   SpO2 100%   BMI 27.82 kg/m     See OB flow sheet    Assessment and Plan    Dario Nathan is a 27 year old  at 17w2d here for TRAMAINE visit, pregnancy complicated by likely pre-gestational diabetes and history of  delivery     This visit:  -We reviewed the risks to pregnancy from Type II DM (miscarriage/stillbrith, birth defects, macrosomia and  hypoglycemia as well as birth trauma and increased risk of ) and I encouraged her to schedule follow up with the diabetes team.   -Baseline pre-e labs drawn today and I recommended starting low dose aspirin which was sent to her pharmacy   -We reviewed the risks of recurrent  birth and the limitations in data behind progesterone supplementation for prevention of  birth, she declines at this time. Will check cervical length with FAS on 12/15  -Declines aneuploidy screening, AFP drawn     Next visit:  -Routine PNC     RTC in 4 weeks or PRMANSI Maki MD

## 2022-12-07 LAB
# FETUSES US: ABNORMAL
AFP MOM SERPL: 2.81
AFP SERPL-MCNC: 89 NG/ML
AGE - REPORTED: 29.6 YR
CURRENT SMOKER: ABNORMAL
FAMILY MEMBER DISEASES HX: NO
GA METHOD: ABNORMAL
GA: ABNORMAL WK
IDDM PATIENT QL: YES
INTEGRATED SCN PATIENT-IMP: ABNORMAL
SPECIMEN DRAWN SERPL: ABNORMAL

## 2022-12-08 ENCOUNTER — PRE VISIT (OUTPATIENT)
Dept: MATERNAL FETAL MEDICINE | Facility: CLINIC | Age: 27
End: 2022-12-08

## 2022-12-08 DIAGNOSIS — O09.899 HISTORY OF PREMATURE DELIVERY, CURRENTLY PREGNANT: Primary | ICD-10-CM

## 2022-12-08 DIAGNOSIS — Z86.32 HISTORY OF GESTATIONAL DIABETES MELLITUS: ICD-10-CM

## 2022-12-08 DIAGNOSIS — R77.2 ELEVATED ALPHA FETOPROTEIN: ICD-10-CM

## 2022-12-12 ENCOUNTER — OFFICE VISIT (OUTPATIENT)
Dept: MATERNAL FETAL MEDICINE | Facility: CLINIC | Age: 27
End: 2022-12-12
Attending: STUDENT IN AN ORGANIZED HEALTH CARE EDUCATION/TRAINING PROGRAM
Payer: COMMERCIAL

## 2022-12-12 ENCOUNTER — OFFICE VISIT (OUTPATIENT)
Dept: MATERNAL FETAL MEDICINE | Facility: CLINIC | Age: 27
End: 2022-12-12
Attending: OBSTETRICS & GYNECOLOGY
Payer: COMMERCIAL

## 2022-12-12 ENCOUNTER — OFFICE VISIT (OUTPATIENT)
Dept: MATERNAL FETAL MEDICINE | Facility: CLINIC | Age: 27
End: 2022-12-12
Attending: ADVANCED PRACTICE MIDWIFE
Payer: COMMERCIAL

## 2022-12-12 ENCOUNTER — MEDICAL CORRESPONDENCE (OUTPATIENT)
Dept: HEALTH INFORMATION MANAGEMENT | Facility: CLINIC | Age: 27
End: 2022-12-12

## 2022-12-12 ENCOUNTER — TRANSCRIBE ORDERS (OUTPATIENT)
Dept: MATERNAL FETAL MEDICINE | Facility: CLINIC | Age: 27
End: 2022-12-12

## 2022-12-12 ENCOUNTER — LAB (OUTPATIENT)
Dept: LAB | Facility: CLINIC | Age: 27
End: 2022-12-12
Attending: ADVANCED PRACTICE MIDWIFE
Payer: COMMERCIAL

## 2022-12-12 ENCOUNTER — HOSPITAL ENCOUNTER (OUTPATIENT)
Dept: ULTRASOUND IMAGING | Facility: CLINIC | Age: 27
Discharge: HOME | End: 2022-12-12
Attending: ADVANCED PRACTICE MIDWIFE
Payer: COMMERCIAL

## 2022-12-12 DIAGNOSIS — O28.3 ABNORMAL FETAL ULTRASOUND: Primary | ICD-10-CM

## 2022-12-12 DIAGNOSIS — R77.2 ELEVATED ALPHA FETOPROTEIN: ICD-10-CM

## 2022-12-12 DIAGNOSIS — Z86.32 HISTORY OF GESTATIONAL DIABETES MELLITUS: ICD-10-CM

## 2022-12-12 DIAGNOSIS — O24.112 PREGNANCY COMPLICATED BY PRE-EXISTING TYPE 2 DIABETES, SECOND TRIMESTER: Primary | ICD-10-CM

## 2022-12-12 DIAGNOSIS — O26.90 PREGNANCY RELATED CONDITION, ANTEPARTUM: ICD-10-CM

## 2022-12-12 DIAGNOSIS — O28.3 ABNORMAL FETAL ULTRASOUND: ICD-10-CM

## 2022-12-12 DIAGNOSIS — O41.8X90 SEPARATION OF CHORION AND AMNION MEMBRANES, ANTEPARTUM: ICD-10-CM

## 2022-12-12 DIAGNOSIS — O26.90 PREGNANCY RELATED CONDITION, ANTEPARTUM: Primary | ICD-10-CM

## 2022-12-12 DIAGNOSIS — O09.899 HISTORY OF PREMATURE DELIVERY, CURRENTLY PREGNANT: ICD-10-CM

## 2022-12-12 DIAGNOSIS — O09.892 HISTORY OF PRETERM DELIVERY, CURRENTLY PREGNANT IN SECOND TRIMESTER: ICD-10-CM

## 2022-12-12 PROCEDURE — 76817 TRANSVAGINAL US OBSTETRIC: CPT | Mod: 26 | Performed by: OBSTETRICS & GYNECOLOGY

## 2022-12-12 PROCEDURE — 99204 OFFICE O/P NEW MOD 45 MIN: CPT | Mod: 25 | Performed by: OBSTETRICS & GYNECOLOGY

## 2022-12-12 PROCEDURE — G0463 HOSPITAL OUTPT CLINIC VISIT: HCPCS | Mod: 25 | Performed by: OBSTETRICS & GYNECOLOGY

## 2022-12-12 PROCEDURE — 76811 OB US DETAILED SNGL FETUS: CPT

## 2022-12-12 PROCEDURE — 36415 COLL VENOUS BLD VENIPUNCTURE: CPT

## 2022-12-12 PROCEDURE — 76811 OB US DETAILED SNGL FETUS: CPT | Mod: 26 | Performed by: OBSTETRICS & GYNECOLOGY

## 2022-12-12 NOTE — PROGRESS NOTES
Please see the imaging tab for details of the ultrasound performed today.    Cassie Husain MD  Specialist in Maternal-Fetal Medicine

## 2022-12-12 NOTE — PROGRESS NOTES
Maternal-Fetal Medicine Consultation    Dario Nathan  : 1995  MRN: [id number]    REFERRAL:  Dario Nathan is a 27 year old sent by Dr. Maki for consultation regarding pregnancy complicated by type 2 diabetes mellitus and history of prior  delivery.    HPI:  Dario Nathan is a 27 year old  at 18w2d by 6w4d ultrasound presenting with concerns regarding pregnancy complicated by history of type 2 diabetes mellitus and history of  birth. Ms. Nathan's first trimester HgbA1c was 6.7. She has been checking her blood sugars and notes that her 2 hour postprandial blood sugars are occasionally elevated related to her diet. Her fasting blood sugars have been within target range. She is taking her Levimir as prescribed. Dario has not had a recent eye exam.    Ms. Nathan also has a history of spontaneous  birth. She had PPROM at 31w4d and was managed expectantly. She underwent IOL at 34 weeks gestation and had an  at 34w1d. Her cervix was noted by be FT/Long/High at the time of induction.    Pregnancy complicated by:  1) Type 2 diabetes mellitus  2) History of  birth  3) Chorion amnion separation    Obstetrics History:  OB History    Para Term  AB Living   5 2 1 1 2 2   SAB IAB Ectopic Multiple Live Births   2 0 0 0 2      # Outcome Date GA Lbr Azael/2nd Weight Sex Delivery Anes PTL Lv   5 Current            4 SAB 22     SAB      3  12/31/15 34w1d 01:45 / 00:22 3 kg (6 lb 9.8 oz) M Vag-Spont None Y JIM      Apgar1: 8  Apgar5: 9   2 SAB 2015 9w0d    SAB      1 Term 12 38w4d 06:45 / 01:13 2.977 kg (6 lb 9 oz) M Vag-Spont Local N JIM      Name: BLADIMIR,JOHN CARMICHAEL      Apgar1: 9  Apgar5: 9     Past Medical History:  Past Medical History:   Diagnosis Date     Diabetes mellitus, type 2 (H) 11/10/2022     Genital herpes      Varicella      Past Surgical History:  Past Surgical History:   Procedure Laterality Date     NO HISTORY OF SURGERY       Current  Medications:  Prior to Admission medications    Medication Sig Last Dose Taking? Auth Provider Long Term End Date   aspirin (ASA) 81 MG EC tablet Take 1 tablet (81 mg) by mouth daily for 180 days   Anna Maki MD  6/3/23   blood glucose (NO BRAND SPECIFIED) lancets standard Use to test blood sugar 4 times daily or as directed.   John Campos CNM     blood glucose (NO BRAND SPECIFIED) test strip Use to test blood sugar 4 times daily or as directed. Fasting and 1 hour postprandial   John Campos CNM     blood glucose monitoring (NO BRAND SPECIFIED) meter device kit Use to test blood sugar 4 times daily or as directed.   John Campos CNM     insulin detemir (LEVEMIR PEN) 100 UNIT/ML pen Inject 14 Units Subcutaneous At Bedtime   John Campos CNM Yes    insulin pen needle (32G X 4 MM) 32G X 4 MM miscellaneous Use 1 pen needles daily or as directed.   John Campos CNM     Prenatal Vit-DSS-Fe Cbn-FA (PRENATAL AD PO)    Reported, Patient       Allergies:  Patient has no known allergies.    Social History:   Social History     Socioeconomic History     Marital status:      Spouse name: Not on file     Number of children: Not on file     Years of education: Not on file     Highest education level: Not on file   Occupational History     Not on file   Tobacco Use     Smoking status: Never     Smokeless tobacco: Never   Vaping Use     Vaping Use: Never used   Substance and Sexual Activity     Alcohol use: No     Drug use: No     Sexual activity: Yes     Partners: Male   Other Topics Concern     Not on file   Social History Narrative     Not on file     Social Determinants of Health     Financial Resource Strain: Not on file   Food Insecurity: Not on file   Transportation Needs: Not on file   Physical Activity: Not on file   Stress: Not on file   Social Connections: Not on file   Intimate Partner Violence: Not on file   Housing Stability: Not on file     Family History:  Family History    Problem Relation Age of Onset     Diabetes Mother      Diabetes Maternal Grandmother      Cancer Maternal Uncle         brain or spinal cord cancer?     ROS:  10-point ROS negative except as in HPI     PHYSICAL EXAM:  LMP 2022   Gen: NAD, well appearing  Chest: Non-labored breathing  Abdomen: gravid    Labs:    Lab Results   Component Value Date    ABO O 2015    RH  Pos 2015    AS Negative 10/11/2022    HEPBANG Nonreactive 10/11/2022    CHPCRT Negative 10/11/2022    GCPCRT Negative 10/11/2022    TREPAB Non-reactive 2015    RUBELLAABIGG 4.25 10/14/2011    HGB 11.4 (L) 2022    HIV Non-Reactive 10/14/2011     Ultrasounds:   Please see the imaging tab for details of the ultrasound performed today.    ASSESSMENT:  27 year old y.o.  at 18w2d by 6w4d ultrasound, not consistent with LMP.    History of Spontaneous  Birth (PTB)   Today we discussed the incidence and epidemiology of  birth (PTB).  There are multiple etiologies of spontaneous PTB, including but not limited to infection, bleeding, uterine distension, cervical insufficiency and stress.   However, most spontaneous  deliveries occur in patients with no risk factors.  A history of prior  delivery is a significant risk factor for recurrence in a subsequent pregnancy.  Recurrent  delivery has been linked to maternal ethnicity, genitourinary infection, and especially gestational age at the first  delivery: the earlier the delivery, the greater the likelihood of recurrence.  We discussed that recurrent  birth can happen at the same gestational age as a prior  birth but that it cannot be predicted and it could recur at an earlier gestational age.  We also discussed the concept of cervical insufficiency which is another cause of  birth and is classically described as painless cervical dilation.      We discussed the increased  morbidity and mortality with  prematurity which is the rationale for trying to prevent recurrent  birth.  We follow cervical lengths every two from 16 to 24 weeks in women with a history of PTB; an ultrasound indicated cerclage may be considered if shortening is detected prior to 23-24 weeks, given that all cases of cervical insufficiency may not fit into the classic patterns by history.    We discussed the data regarding the use of weekly intramuscular injections of 17 hydroxyprogesterone caproate (17-OHP) supplementation as a means to modify the risk of recurrent  birth.  A multicenter, double-blind randomized controlled trial found a 34% reduction in recurrent  birth <37 weeks (Usman et al, 2003) with the use of 17-OHP .  This trial also found a significant reduction in recurrent  birth <35 and <32 weeks.  A more recent international, double-blind randomized controlled trial (PROLONG) found no reduction in the rate of recurrent  birth <35 weeks (Juice et al, 2019), calling into question the established use of 17-OHP.  Importantly, both studies indicate that 17-OHP is safe, at least in the short term.       The Society for Maternal Fetal Medicine interprets these disparate results as possibly partially related to the different populations in the two studies (59% black in [place] versus 90% white in [place]; 50%  in [place] versus 90% in [place]; 20% tobacco use in [place] versus 8% in [place]; 32% had more than one prior PTB in [place] versus 12% in [place]; 91% with at least on additional risk factor for PTB in [place] versus 48% in [place]).  The current conclusion of [facility] is that it is reasonable to offer 17-OHP to women with a profile more representative of the very high risk patient, but that all women at risk of recurrent  birth should have a discussion of the risks/benefits and undergo a shared decision-making process.    After the publication of the [place] (PROLONG) study an FDA  Advisory Committee recommended withdrawing [place] (17 - OHP) off the market.  The FDA met in October and voted to remove [place] from the market. We discussed that with the available literature that I would not recommend 17-OH progesterone at this time.    After a discussion of the above Ms. Nathan opted for cervical length surveillance, but declined 17-OH progesterone.    Recommendations:  - Optimize modifiable risk factors: maintenance of adequate nutrition.  - Transvaginal cervical length every 2 weeks until 23-24 weeks.     - Ultrasound-indicated cerclage should be considered if shortening <25 mm is diagnosed <24 weeks.  - Administration of  corticosteroids if the patient is deemed to be at increased risk of imminent  delivery.  - Urine culture every trimester with aggressive treatment of bacteriuria.  - Clinical evaluation of  labor symptoms.      Pregestational Diabetes Mellitus  Today we reviewed that pregnancy is characterized by insulin resistance.  Placental hormones increase in the second trimester and insulin resistance is greatest in the third trimester.  This often manifests as worsening glycemic control.  The overall goal of management of diabetes in pregnancy is maintenance of euglycemia which minimizes maternal and fetal risks.      We discussed that women with diabetes have significantly increased incidence of adverse pregnancy outcome.  The frequency of fetal/, obstetric and maternal outcomes are increased.  Fetal/ complications in pregnant women with diabetes include spontaneous miscarriage, stillbirth, congenital malformations, fetal growth abnormalities (often macrosomia, but can have fetal growth restriction with long-standing diabetes and associated vascular disease),  respiratory distress, hypoglycemia, and hyperbilirubinemia.  Obstetric complications in women with diabetes include increased incidence of preeclampsia,  term delivery and  polyhydramnios.  Associated with macrosomia is an increase in significant maternal lacerations and  delivery as well as wound complications.  Polyhydramnios increases the risk of postpartum hemorrhage, as does macrosomia.  Maternal considerations include increased difficulty in maintaining euglycemia, increased risk of DKA and more frequent hospitalization..      Women who are in poor glycemic control have an increased frequency of complications.  The incidence of these outcomes is directly related to the hemoglobin A1C (HbA1C) level.  Poor glycemic control during the period of fetal organogenesis increases the incidence of miscarriage and congenital anomalies, and ongoing poor control increases the risk of stillbirth. Cardiac defects are most commonly seen as well as renal, central nervous system, spinal and gastrointestinal anomalies.      We discussed the importance of good glycemic control throughout the pregnancy to prevent the above listed complications.  In addition, concurrent maternal medical disorders are associated with long-standing diabetes mellitus, especially: thyroid dysfunction, retinopathy, nephropathy, and vascular disease.  To better understand the risks of undergoing pregnancy, a baseline assessment of each of these organs is recommended.     Recommendations:  - Follow up with a diabetes educator  - Initiation of a wholesome diet; often recommended is:   o 40-50% complex, high fiber carbohydrate  o 10-30% protein  o 20-35% unsaturated fats  - Education regarding carbohydrate counting  - Continue glucose testing with fasting and one or two hours postprandial throughout the pregnancy  o With goals of fasting levels below 95 mg/dL and postprandial levels below 140 mg/dL  o Two-hour postprandial levels can be checked instead of one hour, but these are often harder to remember to do.  That goal is below 120 mg/dL.    - Women who are difficult to control may also need pre-prandial, bedtime  and/or a 2-3 am glucose check; this is most easily assessed via a continuous glucose sensor  - Women should have home urine strips to assess for ketonuria when their serum glucose is > 200 mg/dL.  They should be instructed to immediately report any ketonuria because of the increased risk for DKA  - Create an action plan for hypoglycemia, which may include glucose tablets, fruit juice or milk, and glucagon  - Low dose aspirin for preeclampsia prophylaxis  - Unless there are contraindications, at least 30 minutes of moderate intensity, low fall-risk activity should be encouraged most days of the week.  If she experiences hypoglycemia with exercise recommend a snack prior    Baseline laboratories    Thyroid stimulating hormone (TSH) normal May 2022    Urine Pro/Cr ratio and baseline liver function tests, platelet count, and serum creatinine already completed    Baseline EKG    Ophthalmologic examination (schedule at this time)    Urine cultures every trimester and aggressive treatment of bacteriuria      surveillance    Fetal echocardiogram at 22 weeks    Serial ultrasounds for fetal growth monthly starting after the comprehensive ultrasound     fetal testing with twice weekly BPP at 32 weeks; if the patient is poorly controlled weekly testing should be initiated at 28 weeks and increased to twice weekly at 32 weeks     Delivery    If well-controlled, delivery at 39 weeks is recommended    For women with poorly controlled diabetes, a delivery before 39 weeks may be indicated, and we can help make recommendations as the pregnancy continues    Maternal glucose should be maintained at  mg/dL during delivery; this can be done with an insulin drip and requires hourly finger stick assessment in active labor     Elevated MSAFP  We reviewed with the patient the results of her multiple marker screen which indicated an elevated MSAFP of 2.81 MoM.  We discussed that elevations in MSAFP can be associated  with structural fetal malformations such as open neural tube defects or fetal abdominal wall defects.  We discussed that ultrasound can detect 90% of open spina bifida and 100% of anencephaly as well as the vast majority of abdominal wall defects.  We discussed the availability of amniocentesis which would detect 99.5% of open spina bifida but carries a procedure-related risk of pregnancy loss of 1/500. We discussed that while no malformations were detected today, the evaluation of the spine was incomplete today. We also discussed that an amniocentesis would not be recommended today due to the chorion amnion separation.     Recommendations  - Follow-up ultrasound in two weeks to evaluate the anatomy that was sub-optimally visualized today, including spine  - Serial ultrasounds to monitor fetal growth and  surveillance as recommended above for type 2 diabetes mellitus    Chorion amnion separation  We discussed the finding of a separation of the chorion amnion on today's ultrasound. We discussed that a persistent chorion amnion separation is associated with an increased risk for adverse pregnancy outcomes, including stillbirth,  labor and delivery, PPROM and placental abruption. It has also been reported to be associated with an increased risk for aneuploidy and fetal anomalies. We again discussed that while no fetal anomalies were detected today, the evaluation of the anatomy was incomplete. We discussed the option for cell-free DNA screening at this time, which the couple did opt to proceed with following their genetic counseling consultation.    Recommendations:  - Cell-free DNA screening drawn today  - Serial ultrasounds to monitor fetal growth and  surveillance as recommended above for type 2 diabetes mellitus      Cassie Husain MD  Specialist in Maternal-Fetal Medicine    2022 , 4:47 PM      Total time spent on today's date of service: 47 minutes.The patient had all her  questions answered. Thank you for the opportunity to participate in the care of Ms. Nathan. Please do not hesitate to contact us if you may have any questions or concerns.

## 2022-12-13 NOTE — PROGRESS NOTES
[facility]  Genetic Counseling Consult    Patient:  Dario Nathan YOB: 1995   Date of Service:  22   MRN: [id number]    Dario was seen at the [facility] for genetic consultation. The indication for genetic counseling is positive or abnormal genetic screening results and abnormal fetal ultrasound. The patient's ultrasound showed chorioamniotic membrane separation and she had an elevated MSAFP screen. The patient was accompanied to this visit by their partner, Hipolito. The patient is wearing a mask due to current [facility] policies.     The session was conducted in English.      IMPRESSION/ PLAN   1. Dario  has not had genetic screening in this pregnancy but elected to have screening today.     2. During today's [facility] visit, Dario had a blood draw for NIPS through [facility]. The NIPS screens for trisomy 21, 18, and 13 and the patient opted to screen for sex chromosome aneuploidies, but declines reporting of fetal sex. Results are expected in 7-10 days. The patient will be called with results and if they do not answer they requested a detailed message with results on their voicemail.  Patient was informed that results will be available in Construct.     3. Dario had a level II comprehensive anatomy ultrasound today. Please see the ultrasound report for further details. The ultrasound showed chorion amnion separation. Of note, Dario also had an elevated MSAFP. MSAFP MoM= 2.81    PREGNANCY HISTORY   /Parity:      Dario s pregnancy history is significant for:    A miscarriage at 12 weeks gestation    A miscarriage at 5 weeks gestation    A son born vaginally in     A son born vaginally premature in     All pregnancies have been with the same father of the baby  CURRENT PREGNANCY   Current Age: 27 year old   Age at Delivery: 27 year old  CACHORRO: 2023, by Ultrasound                                    Gestational Age: 18w3d  This pregnancy is a single gestation.   This pregnancy was conceived spontaneously.     Dario's current pregnancy had an elevated MSAFP. Results of the screen positive maternal serum AFP (1 in 34) were reviewed with the patient. Explanations for an elevated MSAFP include open neural tube defects (ONTD's), abdominal wall defects, incorrect dating, an unrecognized multiple gestation, natural variations in the level of this analyte, or bleeding in pregnancy. ONTD s result from the failure of the neural tube to properly close in early development. Neural tube defects can range in severity and location in the spine. The two most common neural tube defects are spina bifida which is a defect of the spine and anencephaly which is a defect of the brain and skull. Elevated MSAFP can also be due to abdominal wall defects which are the result of the abdominal wall failing to close in early development such as gastroschisis and omphalocele. Ultrasound is our best tool for determining the cause of elevated MSAFP. Ultrasound in the second trimester can detect 92-95% of spina bifida and 100% of anencephaly. We also discussed that if no explanation was found via ultrasound, other testing options, such as amniocentesis may be considered. Significant elevations of maternal serum AFP, especially when seen in conjunction with significant variations of other serum markers, may be an indicator of an increased risk for pregnancy complications, such as intrauterine growth restriction, pre-eclampsia, or  labor.     This pregnancy also showed chorioamniotic membrane separation (CMS). This is a rare ultrasound finding that can be associated with adverse  fetal outcomes. It is often a complication of a invasive prenatal procedure, but in rare cases, like Dario's, it can occur spontaneously. CMS can happen due to a chromosomal disorder, like trisomy 21, trisomy 18, and trisomy 13.  "Dario has not had previous screening in her pregnancy, so this was discussed as a method to learn more information about this possibility. Another explanation for a CMS is that it can occur spontaneously by detachment of fused membranes. The last explanation is when it occurs due to an invasive procedure, like an amniocentesis. Other associations of CMS include miscarriage, fetal demise,  death, and  delivery.    MEDICAL HISTORY   Dario s reported medical history is not expected to impact pregnancy management or risks to fetal development.       FAMILY HISTORY   A three-generation pedigree was obtained today and is scanned under the \"Media\" tab in Epic. The family history was reported by Dario and their partner.    The following significant findings were reported today:     Dario (27y) reports that she has type II diabetes.    Dario reports that her mother (55y) has diabetes and arthritis.    Hipolito (27y) reports that he is healthy.    Otherwise, the reported family history is unremarkable for multiple miscarriages, stillbirths, birth defects, intellectual disabilities, cancer <50y, known genetic conditions, and consanguinity.       CARRIER SCREENING   Expanded carrier screening is available to screen for autosomal recessive conditions and X-linked conditions in a large list of genes. Autosomal recessive conditions happen when a mutation has been inherited from the egg and sperm and include conditions like cystic fibrosis, thalassemia, hearing loss, spinal muscular atrophy, and more. X-linked conditions happen when a mutation has been inherited from the egg and include conditions like fragile X syndrome.  screening was also reviewed. About [place]  Screening    As part of our conversation on carrier screening and how common or rare these condition are, we discussed the patient is of  ancestry and the partner is of  ancestry.    The patient has declined the carrier " screening options today. They are aware the option will remain, and they can contact us if they would like to pursue screening.       RISK ASSESSMENT FOR CHROMOSOME CONDITIONS   We explained that the risk for fetal chromosome abnormalities increases with maternal age. We discussed specific features of common chromosome abnormalities, including Down syndrome, trisomy 13, trisomy 18, and sex chromosome trisomies.      At age 27 at midtrimester, the risk to have a baby with Down syndrome is 1 in 760.     At age 27 at midtrimester, the risk to have a baby with any chromosome abnormality is 1 in 380.     Dario has not had genetic screening in this pregnancy but elected to have screening today.      GENETIC TESTING OPTIONS   Genetic testing during a pregnancy includes screening and diagnostic procedures.      Screening tests are non-invasive which means no risk to the pregnancy and includes ultrasounds and blood work. The benefits and limitations of screening were reviewed. Screening tests provide a risk assessment (chance) specific to the pregnancy for certain fetal chromosome abnormalities but cannot definitively diagnose or exclude a fetal chromosome abnormality. Follow-up genetic counseling and consideration of diagnostic testing is recommended with any abnormal screening result. Diagnostic testing during a pregnancy is more certain and can test for more conditions. However, the tests do have a risk of miscarriage that requires careful consideration. These tests can detect fetal chromosome abnormalities with greater than 99% certainty. These tests can detect fetal chromosome abnormalities with greater than 99% certainty. Results can be compromised by maternal cell contamination or mosaicism and are limited by the resolution of current genetic testing technology.     There is no screening or diagnostic test that detects all forms of birth defects or intellectual disability.     We discussed the following screening  options:   Non-invasive prenatal testing (NIPT)    Also called cell-free DNA screening because it detects chromosomes from the placenta in the pregnant person's blood    Can be done any time after 10 weeks gestation    Screens for trisomy 21, trisomy 18, trisomy 13, and sex chromosome aneuploidies    Cannot screen for open neural tube defects, maternal serum AFP after 15 weeks is recommended      We discussed the following ultrasound options:  Comprehensive level II ultrasound (Fetal Anatomy Ultrasound)    Ultrasound done between 18-20 weeks gestation    Screens for major birth defects and markers for aneuploidy (like trisomy 21 and trisomy 18)    Includes looking at the fetus/baby's growth, heart, organs (stomach, kidneys), placenta, and amniotic fluid      We discussed the following diagnostic options:   Amniocentesis    Invasive diagnostic procedure done after 15 weeks gestation    The procedure collects a small sample of amniotic fluid for the purpose of chromosomal testing and/or other genetic testing    Diagnostic result; more than 99% sensitivity for fetal chromosome abnormalities    Testing for AFP in the amniotic fluid can test for open neural tube defects    Amniocentesis was not recommended at this time per Dr. Husain due to the chorioamniotic membrane separation.    It was a pleasure to be involved with [facility]. Face-to-face time of the meeting was 45 minutes.    Rosario Maurice, GC, MS, [facility]  Certified and [place] Licensed Genetic Counselor  [facility]  Maternal Fetal Medicine  Office: [phone]  [facility]: [phone]   Fax: [phone]  [facility]

## 2022-12-15 NOTE — RESULT ENCOUNTER NOTE
This is the level 2 survey results for a patient seeing the OB/GYN team. You see her next. Just sending this on. .   Thanks,   Funmi Ruiz, CNM, APRN CNM CNM

## 2022-12-19 ENCOUNTER — TELEPHONE (OUTPATIENT)
Dept: MATERNAL FETAL MEDICINE | Facility: CLINIC | Age: 27
End: 2022-12-19

## 2022-12-19 ENCOUNTER — TELEPHONE (OUTPATIENT)
Dept: NURSING | Facility: CLINIC | Age: 27
End: 2022-12-19

## 2022-12-19 LAB — SCANNED LAB RESULT: NORMAL

## 2022-12-19 NOTE — TELEPHONE ENCOUNTER
December 19, 2022    I spoke with Dario regarding her NIPT results.     Results indicate NO ANEUPLOIDY DETECTED for chromosomes 21, 18, 13, or the sex chromosomes.     This puts her current pregnancy at low risk for Down syndrome, trisomy 18, trisomy 13 and sex chromosome abnormalities. This test is reported to have the following sensitivities: Down syndrome- >99.9%, trisomy 18- >99.9%, and trisomy 13- >99.9%. Although these results are reassuring, this does not replace a standard chromosome analysis from a chorionic villus sampling or amniocentesis.     We discussed that this does not eliminate the association of adverse outcomes associated with chorion amnion separation, but significantly reduces concern for an aneuploidy condition.    Her results are available in her Epic chart for her primary OB to review.    Rosario Maurice MS, [facility]  Licensed Genetic Counselor  [facility]  Pager: [phone]  Office: [phone]

## 2022-12-19 NOTE — TELEPHONE ENCOUNTER
Prior Authorization Retail Medication Request    Medication/Dose: Levemir FlexTouch 100unit/ML  ICD code (if different than what is on RX):    Previously Tried and Failed:    Rationale:      Insurance Name:    Insurance ID:        Pharmacy Information (if different than what is on RX)  Name:  [facility] key#[id number]  Phone:  [phone]

## 2022-12-20 NOTE — TELEPHONE ENCOUNTER
No pa needed- there is a current pa approval on file for this medication until 11/2/2025.   Per call to pharmacy, they did not request a pa. Pt last picked up medication on 11/03.

## 2022-12-29 ENCOUNTER — OFFICE VISIT (OUTPATIENT)
Dept: MATERNAL FETAL MEDICINE | Facility: CLINIC | Age: 27
End: 2022-12-29
Attending: OBSTETRICS & GYNECOLOGY
Payer: COMMERCIAL

## 2022-12-29 ENCOUNTER — HOSPITAL ENCOUNTER (OUTPATIENT)
Dept: ULTRASOUND IMAGING | Facility: CLINIC | Age: 27
Discharge: HOME | End: 2022-12-29
Attending: OBSTETRICS & GYNECOLOGY
Payer: COMMERCIAL

## 2022-12-29 DIAGNOSIS — O41.8X90 SEPARATION OF CHORION AND AMNION MEMBRANES, ANTEPARTUM: ICD-10-CM

## 2022-12-29 DIAGNOSIS — O09.899 HISTORY OF PREMATURE DELIVERY, CURRENTLY PREGNANT: ICD-10-CM

## 2022-12-29 DIAGNOSIS — O24.112 PREGNANCY COMPLICATED BY PRE-EXISTING TYPE 2 DIABETES, SECOND TRIMESTER: Primary | ICD-10-CM

## 2022-12-29 PROCEDURE — 76816 OB US FOLLOW-UP PER FETUS: CPT | Mod: 26 | Performed by: OBSTETRICS & GYNECOLOGY

## 2022-12-29 PROCEDURE — 76816 OB US FOLLOW-UP PER FETUS: CPT

## 2022-12-29 NOTE — PROGRESS NOTES
"Please see \"Imaging\" tab under \"Chart Review\" for details of today's US.    Milagros Marti, DO    "

## 2022-12-30 ENCOUNTER — PRENATAL OFFICE VISIT (OUTPATIENT)
Dept: OBGYN | Facility: CLINIC | Age: 27
End: 2022-12-30
Payer: COMMERCIAL

## 2022-12-30 VITALS
OXYGEN SATURATION: 100 % | SYSTOLIC BLOOD PRESSURE: 110 MMHG | WEIGHT: 157 LBS | DIASTOLIC BLOOD PRESSURE: 72 MMHG | BODY MASS INDEX: 27.82 KG/M2 | HEART RATE: 80 BPM

## 2022-12-30 DIAGNOSIS — O09.92 SUPERVISION OF HIGH-RISK PREGNANCY, SECOND TRIMESTER: Primary | ICD-10-CM

## 2022-12-30 DIAGNOSIS — E11.9 TYPE 2 DIABETES MELLITUS WITHOUT COMPLICATION, WITHOUT LONG-TERM CURRENT USE OF INSULIN (H): ICD-10-CM

## 2022-12-30 PROCEDURE — 99214 OFFICE O/P EST MOD 30 MIN: CPT | Performed by: OBSTETRICS & GYNECOLOGY

## 2022-12-30 NOTE — PROGRESS NOTES
20w6d  Type 2 diabetes:  Taking Levemir 14 units nightly.  Reports BS starting to creep up to above goal numbers.  Now in mornings are above 100.  Low 100s to 110.    Blood sugars reviewed on her meter:  12/30:  Fasting 103  12/29:  Fasting 101, 2 hours pp 124, 144  12/28:  F 93, 2 hr pp 89, 123  12/27:  F 100, pp 79, 120  12/26:  F 106, pp 87  12/25:  ------  12/824:  F 87, pp 103    Discussed with these fasting numbers, recommend increasing to Levemir 16 units nightly.  Denies needing any refills for insulin or testing supplies at this time.  Reports difficulty in connecting with DE over the phone as it turns out they were calling from  line, which came from [place] and she wasn't expecting this.  Not sure why they used , as she's fluent in English.  Discussed note in her chart says she uses Turkish for spoken language and English for printed.  Will have that note removed from her chart so no issues in the future.  She does generally prefer phone appointments for this, but will schedule next OB visit in person on a Friday so she can check in with them as well.    Following with MFM for US.  Fluid subjectively low yesterday, but WNL (low end of normal).  Denies s/sx of PPROM.  Staying fairly well hydrated with 1/2 gallon/day.    RTC 2w.  Erin Hyman MD

## 2022-12-30 NOTE — Clinical Note
Please see my prenatal note from today re Daroi's blood sugars and plan for follow-up.  Feel free to reach out with any questions or concerns.  Happy New Year! Erin Hyman MD

## 2023-01-13 ENCOUNTER — ALLIED HEALTH/NURSE VISIT (OUTPATIENT)
Dept: EDUCATION SERVICES | Facility: CLINIC | Age: 28
End: 2023-01-13
Payer: COMMERCIAL

## 2023-01-13 ENCOUNTER — PRENATAL OFFICE VISIT (OUTPATIENT)
Dept: OBGYN | Facility: CLINIC | Age: 28
End: 2023-01-13
Payer: COMMERCIAL

## 2023-01-13 ENCOUNTER — TELEPHONE (OUTPATIENT)
Dept: OBGYN | Facility: CLINIC | Age: 28
End: 2023-01-13

## 2023-01-13 VITALS
WEIGHT: 158.2 LBS | DIASTOLIC BLOOD PRESSURE: 50 MMHG | SYSTOLIC BLOOD PRESSURE: 94 MMHG | HEIGHT: 62 IN | HEART RATE: 79 BPM | BODY MASS INDEX: 29.11 KG/M2

## 2023-01-13 DIAGNOSIS — O24.111 PRE-EXISTING TYPE 2 DIABETES MELLITUS DURING PREGNANCY IN FIRST TRIMESTER: Primary | ICD-10-CM

## 2023-01-13 DIAGNOSIS — O09.92 SUPERVISION OF HIGH-RISK PREGNANCY, SECOND TRIMESTER: Primary | ICD-10-CM

## 2023-01-13 PROCEDURE — G0108 DIAB MANAGE TRN  PER INDIV: HCPCS | Performed by: DIETITIAN, REGISTERED

## 2023-01-13 PROCEDURE — 99207 PR PRENATAL VISIT: CPT | Performed by: OBSTETRICS & GYNECOLOGY

## 2023-01-13 NOTE — LETTER
January 13, 2023      Luis Nathan  [address] [place] [address]        To Whom It May Concern,      This letter is to the employer of Hipolito Nathan. Please note that Hipolito's wife, Dario, is currently pregnant and is expected to be seen in clinic periodically for supervision of a normal pregnancy and postpartum care following delivery. If you have any questions or concerns regarding this letter, please call [phone].        Sincerely,        Yudith Kendrick MD

## 2023-01-13 NOTE — LETTER
1/13/2023         RE: Dario Nathan  [address] [place] [facility] [address]        Dear Colleague,    Thank you for referring your patient, Dario Nathan, to the [facility]. Please see a copy of my visit note below.    Diabetes Self-Management Education & Support      SUBJECTIVE/OBJECTIVE:  Presents for education related to gestational diabetes.    Accompanied by: Self  Gestational weeks: 22w6d  [facility]ospital planned for delivery: [facility]  Number of previous pregnancies: 2  Had any babies over 9 lbs: No  Previously had Gestational Diabetes: Yes  Had Diabetes Education before: Yes  Previous insulin or other diabetes medication during that pregnancy: Yes  Have you ever had thyroid problems or taken thyroid medication?: No  Heart disease, mitral valve prolapse or rheumatic fever?: No  Hypertension : No  High Cholesterol: No  High Triglycerides: No  Do you use tobacco products?: No  Do you drink beer, wine or hard liquor?: No    Cultural Influences/Ethnic Background:  Choose not to answer      Estimated Date of Delivery: May 13, 2023    1 hour OGTT  Lab Results   Component Value Date    GLU1 221 (H) 11/11/2015         3 hour OGTT    Fasting  No results found for: GTTGF    1 hour  No results found for: GTTG1    2 hour  No results found for: GTTG2    3 hour  No results found for: GTTG3     Blood Glucose/Ketone Log:    Date Ketones Fasting Post Breakfast Post Lunch Post Supper   1/7  78 121     1/8  79   132   1/9  96 111 93 100   1/10  85  79 102   1/11  92  150* 145*   1/12   102 105 137*   1/13  95          Lifestyle and Health Behaviors:  Pre-pregnancy weight (lbs): 157  Exercise:: Currently not exercising (but walks at work)  Barrier to exercise: None  Cultural/Adventist diet restrictions?: No  Meal planning/habits: None  How many times a week on average do you eat food made away from home (restaurant/take-out)?: 3  Meals include: Breakfast, Lunch, Dinner  Breakfast: eggs+  avocado toast OR greek yogurt + granola  Lunch: salad OR quesadilla + meat OR leftovers  Dinner: Chipotle OR [employer] OR chicken nuggets  Snacks: (Sometimes snacks) AM-usually doesnt have almonds or cutie; PM-yogurt OR cereal OR k protein bar OR string cheese  Beverages: Water, Coffee  How many servings of fruits/vegetables per day: 1  Biggest challenges to healthy eating: None  Pre-abimael vitamin?: Yes  Supplements?: No  Experiencing nausea?: No  Experiencing heartburn?: No    Healthy Coping:  Emotional response to diabetes: Ready to learn  Informal Support system:: Family, Friends, Spouse  Stage of change: ACTION (Actively working towards change)    Current Management:  Taking medications for gestational diabetes?: Yes  Difficulty affording diabetes medication?: No  Difficulty affording diabetes testing supplies?: No    ASSESSMENT:  Dario has a new GDM diagnosis, A1C checked early on in pregnancy and 6.7% so may actually have DM, was 6.4% (22). She was started on Levemir and is currently taking 16u. She has been checking BG but notes her schedule is busy and she forgets at times. Currently >50% of dinner BG are above goals,she believes this is diet related. Discussed that she should work on diet this week aiming to meet rec cho at meals/snacks and if still elevated next week we will start mealtime insulin. Suggested a CGM may be an option for her if she continues to forget to check BG. She will work on trying to be more consistent this week.  She did well with education and verbalized understanding of all topics covered today. She has f/u .      INTERVENTION:  Reviewed target blood glucose values, sharps disposal, diagnosis criteria for GDM and importance of good blood glucose management for health of mom and baby. Patient advised to call if 3 blood glucose elevated before returns next week. Instructed on ketone checking and told to call if unable to get ketones negative.       Discussed carbohydrate  sources and impact on blood glucose. Reviewed basics of healthy eating and incorporating a variety of foods into meal plan. Instructed on carbohydrate counting and label reading and recommended patient consume 30-45g cho for breakfast, 45-60g cho for lunch and dinner and 15-30g cho for each snacks, also adding protein at each of these. Suggested plate method to balance meals.      Discussed importance of not going too low in carbohydrates since that may cause liver to produce excess glucose and contribute to elevated blood glucose readings and ketone formation. Encouraged eating breakfast within 1 hour of waking.  To also help prevent against ketone formation, protein was encouraged with meals and snacks, especially with the night snack. Reviewed benefits of exercising to help lower blood glucose and walking after meals, as tolerated and per MD approval, if seeing elevated blood glucose after a meal. Pt verbalized understanding of concepts discussed and recommendations provided.      Educational topics covered today:  GDM diagnosis, pathophysiology, Risks and Complications of GDM, Means of controlling GDM, Using a Blood Glucose Monitor, Blood Glucose Goals, Logging and Interpreting Glucose Results, Ketone Testing, When to Call a Diabetes Educator or OB Provider, Healthy Eating During Pregnancy, Counting Carbohydrates, Meal Planning for GDM, and Physical Activity    Educational materials provided today:   [facility] Understanding Gestational Diabetes  GDM Log Book  Sharps Disposal  Care After Delivery    Pt verbalized understanding of concepts discussed and recommendations provided today.     PLAN:  1. Check glucose 4 times daily, before breakfast daily and 1 hour after each meal, or as recommended.  Blood glucose goal before breakfast: <95 mg/dL  1 hour after start of meals:  <140 mg/dL OR  2 hours after start of meal: <120mg/dL (can do this if you forget 1 hour check)    2. Check ketones daily or once a week after  they have been negative for 7 days in a row. If ketones are elevated, let your diabetes educator know and continue to check daily until they are negative for 7 days in a row.    3. Continue with recommended physical activity.    4. Continue to follow recommended meal plan: 30-45g carbs at breakfast, 45-60g carbs at lunch, 45-60g carbs at supper, 15-30g carbs at snacks.  Follow consistent CHO meal plan, eat CHO and protein/fat at all meals/snacks.    5. Follow-up with OB doctor as recommended.    6. Call or MyChart message your diabetes educator if 3 or more blood sugars are above the goal in 1 week or if ketones are elevated (small or larger).     Irena Espinal RD, HENRRY, ESTEBAN      Time Spent: 30 minutes  Encounter Type: Individual    Any diabetes medication dose changes were made via the CDE Protocol and Collaborative Practice Agreement with the patient's OB/GYN provider. A copy of this encounter was shared with the provider.

## 2023-01-13 NOTE — PATIENT INSTRUCTIONS
"Your 1 week follow-up Diabetes Education visit is scheduled on 1/20    1. Check glucose 4 times daily, before breakfast daily and 1 hour after each meal, or as recommended.  Blood glucose goal before breakfast: <95 mg/dL  1 hour after start of meals:  <140 mg/dL OR  2 hours after start of meal: <120mg/dL (can do this if you forget 1 hour check)     -Lancets should be placed in a sharps container or you can use a laundry container, do not throw lancets in the trash.  If using laundry container, once mostly full, can duct-tape the lid closed, label \"Sharps-do not recycle\" and then place in trash. You can call your [employer] service to find appropriate drop off sites for lancets.    2. Check ketones daily or once a week after they have been negative for 7 days in a row. If ketones are elevated, let your diabetes educator know and continue to check daily until they are negative for 7 days in a row.    3. Continue with recommended physical activity.    4. Continue to follow recommended meal plan: 30-45g carbs at breakfast, 45-60g carbs at lunch, 45-60g carbs at supper, 15-30g carbs at snacks.  Follow consistent CHO meal plan, eat CHO and protein/fat at all meals/snacks.    5. Follow-up with OB doctor as recommended.    6. Call or MyChart message your diabetes educator if 3 or more blood sugars are above the goal in 1 week or if ketones are elevated (small or larger).       [facility] for the [facility] Area:  For Your Diabetes Education or Nutrition Appointments Call:  [phone]   For Diabetes Education and Nutrition Related Questions:   Phone: [phone]  Send MyChart Message   If you need a medication refill please contact your pharmacy. Please allow 3 business days for your refills to be completed.     "

## 2023-01-13 NOTE — TELEPHONE ENCOUNTER
Reason for call:  Other   Patient called regarding (reason for call): Letter request  Additional comments: Pt is requesting an excuse letter for her  Hipolito Nathan for his job. They just want a letter for his employer so know that she is pregnant.      Phone number to reach patient:  Home number on file [phone] (home)    Best Time:  Any    Can we leave a detailed message on this number?  YES    Travel screening: Not Applicable

## 2023-01-13 NOTE — PROGRESS NOTES
Diabetes Self-Management Education & Support      SUBJECTIVE/OBJECTIVE:  Presents for education related to gestational diabetes.    Accompanied by: Self  Gestational weeks: 22w6d  Hospital planned for delivery: [facility]  Number of previous pregnancies: 2  Had any babies over 9 lbs: No  Previously had Gestational Diabetes: Yes  Had Diabetes Education before: Yes  Previous insulin or other diabetes medication during that pregnancy: Yes  Have you ever had thyroid problems or taken thyroid medication?: No  Heart disease, mitral valve prolapse or rheumatic fever?: No  Hypertension : No  High Cholesterol: No  High Triglycerides: No  Do you use tobacco products?: No  Do you drink beer, wine or hard liquor?: No    Cultural Influences/Ethnic Background:  Choose not to answer      Estimated Date of Delivery: May 13, 2023    1 hour OGTT  Lab Results   Component Value Date    GLU1 221 (H) 11/11/2015         3 hour OGTT    Fasting  No results found for: GTTGF    1 hour  No results found for: GTTG1    2 hour  No results found for: GTTG2    3 hour  No results found for: GTTG3     Blood Glucose/Ketone Log:    Date Ketones Fasting Post Breakfast Post Lunch Post Supper   1/7  78 121     1/8  79   132   1/9  96 111 93 100   1/10  85  79 102   1/11  92  150* 145*   1/12   102 105 137*   1/13  95          Lifestyle and Health Behaviors:  Pre-pregnancy weight (lbs): 157  Exercise:: Currently not exercising (but walks at work)  Barrier to exercise: None  Cultural/Moravian diet restrictions?: No  Meal planning/habits: None  How many times a week on average do you eat food made away from home (restaurant/take-out)?: 3  Meals include: Breakfast, Lunch, Dinner  Breakfast: eggs+ avocado toast OR greek yogurt + granola  Lunch: salad OR quesadilla + meat OR leftovers  Dinner: Chipotle OR [employer] OR chicken nuggets  Snacks: (Sometimes snacks) AM-usually doesnt have almonds or cutie; PM-yogurt OR cereal OR k protein bar OR string  cheese  Beverages: Water, Coffee  How many servings of fruits/vegetables per day: 1  Biggest challenges to healthy eating: None  Pre- vitamin?: Yes  Supplements?: No  Experiencing nausea?: No  Experiencing heartburn?: No    Healthy Coping:  Emotional response to diabetes: Ready to learn  Informal Support system:: Family, Friends, Spouse  Stage of change: ACTION (Actively working towards change)    Current Management:  Taking medications for gestational diabetes?: Yes  Difficulty affording diabetes medication?: No  Difficulty affording diabetes testing supplies?: No    ASSESSMENT:  Dario has a new GDM diagnosis, A1C checked early on in pregnancy and 6.7% so may actually have DM, was 6.4% (22). She was started on Levemir and is currently taking 16u. She has been checking BG but notes her schedule is busy and she forgets at times. Currently >50% of dinner BG are above goals,she believes this is diet related. Discussed that she should work on diet this week aiming to meet rec cho at meals/snacks and if still elevated next week we will start mealtime insulin. Suggested a CGM may be an option for her if she continues to forget to check BG. She will work on trying to be more consistent this week.  She did well with education and verbalized understanding of all topics covered today. She has f/u .      INTERVENTION:  Reviewed target blood glucose values, sharps disposal, diagnosis criteria for GDM and importance of good blood glucose management for health of mom and baby. Patient advised to call if 3 blood glucose elevated before returns next week. Instructed on ketone checking and told to call if unable to get ketones negative.       Discussed carbohydrate sources and impact on blood glucose. Reviewed basics of healthy eating and incorporating a variety of foods into meal plan. Instructed on carbohydrate counting and label reading and recommended patient consume 30-45g cho for breakfast, 45-60g cho for lunch  and dinner and 15-30g cho for each snacks, also adding protein at each of these. Suggested plate method to balance meals.      Discussed importance of not going too low in carbohydrates since that may cause liver to produce excess glucose and contribute to elevated blood glucose readings and ketone formation. Encouraged eating breakfast within 1 hour of waking.  To also help prevent against ketone formation, protein was encouraged with meals and snacks, especially with the night snack. Reviewed benefits of exercising to help lower blood glucose and walking after meals, as tolerated and per MD approval, if seeing elevated blood glucose after a meal. Pt verbalized understanding of concepts discussed and recommendations provided.      Educational topics covered today:  GDM diagnosis, pathophysiology, Risks and Complications of GDM, Means of controlling GDM, Using a Blood Glucose Monitor, Blood Glucose Goals, Logging and Interpreting Glucose Results, Ketone Testing, When to Call a Diabetes Educator or OB Provider, Healthy Eating During Pregnancy, Counting Carbohydrates, Meal Planning for GDM, and Physical Activity    Educational materials provided today:   Gino Understanding Gestational Diabetes  GDM Log Book  Sharps Disposal  Care After Delivery    Pt verbalized understanding of concepts discussed and recommendations provided today.     PLAN:  1. Check glucose 4 times daily, before breakfast daily and 1 hour after each meal, or as recommended.  Blood glucose goal before breakfast: <95 mg/dL  1 hour after start of meals:  <140 mg/dL OR  2 hours after start of meal: <120mg/dL (can do this if you forget 1 hour check)    2. Check ketones daily or once a week after they have been negative for 7 days in a row. If ketones are elevated, let your diabetes educator know and continue to check daily until they are negative for 7 days in a row.    3. Continue with recommended physical activity.    4. Continue to follow  recommended meal plan: 30-45g carbs at breakfast, 45-60g carbs at lunch, 45-60g carbs at supper, 15-30g carbs at snacks.  Follow consistent CHO meal plan, eat CHO and protein/fat at all meals/snacks.    5. Follow-up with OB doctor as recommended.    6. Call or MyChart message your diabetes educator if 3 or more blood sugars are above the goal in 1 week or if ketones are elevated (small or larger).     Irena Espinal RD, HENRRY, [facility]      Time Spent: 30 minutes  Encounter Type: Individual    Any diabetes medication dose changes were made via the CDE Protocol and Collaborative Practice Agreement with the patient's OB/GYN provider. A copy of this encounter was shared with the provider.

## 2023-01-13 NOTE — PROGRESS NOTES
22w6d overall feeling good, no c/o.  Good fm.  Was able to meet with DE today and begin coordinating care with them.  Overall BS have been ok, rising a little, so insulin adjusted.  She has been having some urinary incontinence, and typically knows it is urine, but has in the back of her mind how she had PPROM last pregnancy.  I recommended calling if she has leakage and is concerned it is amniotic fluid, we would do eval.  Growth us next week. Otherwise, RTC 4 weeks  jlp

## 2023-01-13 NOTE — TELEPHONE ENCOUNTER
Can we write a letter for this patients ?     I pended a letter if we are able to do this?  May want to review and edit if needed.    Claudia Clarke RN

## 2023-01-15 ENCOUNTER — HEALTH MAINTENANCE LETTER (OUTPATIENT)
Age: 28
End: 2023-01-15

## 2023-01-18 ENCOUNTER — HOSPITAL ENCOUNTER (OUTPATIENT)
Dept: ULTRASOUND IMAGING | Facility: CLINIC | Age: 28
Discharge: HOME | End: 2023-01-18
Attending: OBSTETRICS & GYNECOLOGY
Payer: COMMERCIAL

## 2023-01-18 ENCOUNTER — HOSPITAL ENCOUNTER (OUTPATIENT)
Dept: CARDIOLOGY | Facility: CLINIC | Age: 28
Discharge: HOME | End: 2023-01-18
Attending: OBSTETRICS & GYNECOLOGY
Payer: COMMERCIAL

## 2023-01-18 ENCOUNTER — OFFICE VISIT (OUTPATIENT)
Dept: MATERNAL FETAL MEDICINE | Facility: CLINIC | Age: 28
End: 2023-01-18
Attending: OBSTETRICS & GYNECOLOGY
Payer: COMMERCIAL

## 2023-01-18 ENCOUNTER — HOSPITAL ENCOUNTER (OUTPATIENT)
Facility: CLINIC | Age: 28
Discharge: HOME | End: 2023-01-18
Attending: OBSTETRICS & GYNECOLOGY | Admitting: OBSTETRICS & GYNECOLOGY
Payer: COMMERCIAL

## 2023-01-18 ENCOUNTER — HOSPITAL ENCOUNTER (OUTPATIENT)
Facility: CLINIC | Age: 28
End: 2023-01-18
Admitting: OBSTETRICS & GYNECOLOGY
Payer: COMMERCIAL

## 2023-01-18 VITALS
HEART RATE: 62 BPM | DIASTOLIC BLOOD PRESSURE: 62 MMHG | TEMPERATURE: 98 F | RESPIRATION RATE: 16 BRPM | SYSTOLIC BLOOD PRESSURE: 110 MMHG

## 2023-01-18 DIAGNOSIS — N76.0 BACTERIAL VAGINOSIS: Primary | ICD-10-CM

## 2023-01-18 DIAGNOSIS — O09.899 HISTORY OF PREMATURE DELIVERY, CURRENTLY PREGNANT: ICD-10-CM

## 2023-01-18 DIAGNOSIS — O24.112 PREGNANCY COMPLICATED BY PRE-EXISTING TYPE 2 DIABETES, SECOND TRIMESTER: Primary | ICD-10-CM

## 2023-01-18 DIAGNOSIS — Z86.32 HISTORY OF GESTATIONAL DIABETES MELLITUS: ICD-10-CM

## 2023-01-18 DIAGNOSIS — B96.89 BACTERIAL VAGINOSIS: Primary | ICD-10-CM

## 2023-01-18 DIAGNOSIS — O28.8 AFI (AMNIOTIC FLUID INDEX) BORDERLINE LOW: ICD-10-CM

## 2023-01-18 LAB
ALBUMIN UR-MCNC: NEGATIVE MG/DL
APPEARANCE UR: CLEAR
BILIRUB UR QL STRIP: NEGATIVE
CLUE CELLS: PRESENT
COLOR UR AUTO: ABNORMAL
GLUCOSE BLDC GLUCOMTR-MCNC: 82 MG/DL (ref 70–99)
GLUCOSE UR STRIP-MCNC: NEGATIVE MG/DL
HGB UR QL STRIP: NEGATIVE
KETONES UR STRIP-MCNC: NEGATIVE MG/DL
LEUKOCYTE ESTERASE UR QL STRIP: NEGATIVE
MUCOUS THREADS #/AREA URNS LPF: PRESENT /LPF
NITRATE UR QL: NEGATIVE
PH UR STRIP: 6.5 [PH] (ref 5–7)
RBC URINE: <1 /HPF
RUPTURE OF FETAL MEMBRANES BY ROM PLUS: NEGATIVE
SP GR UR STRIP: 1 (ref 1–1.03)
TRICHOMONAS, WET PREP: ABNORMAL
UROBILINOGEN UR STRIP-MCNC: NORMAL MG/DL
WBC URINE: <1 /HPF
WBC'S/HIGH POWER FIELD, WET PREP: ABNORMAL
YEAST, WET PREP: ABNORMAL

## 2023-01-18 PROCEDURE — 76817 TRANSVAGINAL US OBSTETRIC: CPT

## 2023-01-18 PROCEDURE — 87591 N.GONORRHOEAE DNA AMP PROB: CPT

## 2023-01-18 PROCEDURE — 76816 OB US FOLLOW-UP PER FETUS: CPT | Mod: 26 | Performed by: OBSTETRICS & GYNECOLOGY

## 2023-01-18 PROCEDURE — 76825 ECHO EXAM OF FETAL HEART: CPT | Mod: 26 | Performed by: PEDIATRICS

## 2023-01-18 PROCEDURE — 84112 EVAL AMNIOTIC FLUID PROTEIN: CPT | Performed by: OBSTETRICS & GYNECOLOGY

## 2023-01-18 PROCEDURE — 76817 TRANSVAGINAL US OBSTETRIC: CPT | Mod: 26 | Performed by: OBSTETRICS & GYNECOLOGY

## 2023-01-18 PROCEDURE — 93325 DOPPLER ECHO COLOR FLOW MAPG: CPT

## 2023-01-18 PROCEDURE — 87210 SMEAR WET MOUNT SALINE/INK: CPT

## 2023-01-18 PROCEDURE — 87653 STREP B DNA AMP PROBE: CPT

## 2023-01-18 PROCEDURE — 99214 OFFICE O/P EST MOD 30 MIN: CPT | Mod: 25 | Performed by: OBSTETRICS & GYNECOLOGY

## 2023-01-18 PROCEDURE — 87491 CHLMYD TRACH DNA AMP PROBE: CPT

## 2023-01-18 PROCEDURE — 87086 URINE CULTURE/COLONY COUNT: CPT

## 2023-01-18 PROCEDURE — G0463 HOSPITAL OUTPT CLINIC VISIT: HCPCS

## 2023-01-18 PROCEDURE — 93325 DOPPLER ECHO COLOR FLOW MAPG: CPT | Mod: 26 | Performed by: PEDIATRICS

## 2023-01-18 PROCEDURE — 81001 URINALYSIS AUTO W/SCOPE: CPT

## 2023-01-18 PROCEDURE — 76825 ECHO EXAM OF FETAL HEART: CPT

## 2023-01-18 PROCEDURE — 59025 FETAL NON-STRESS TEST: CPT | Mod: 26 | Performed by: OBSTETRICS & GYNECOLOGY

## 2023-01-18 PROCEDURE — 76827 ECHO EXAM OF FETAL HEART: CPT | Mod: 26 | Performed by: PEDIATRICS

## 2023-01-18 PROCEDURE — 82962 GLUCOSE BLOOD TEST: CPT

## 2023-01-18 RX ORDER — METRONIDAZOLE 500 MG/1
500 TABLET ORAL 2 TIMES DAILY
Qty: 14 TABLET | Refills: 0 | Status: ON HOLD | OUTPATIENT
Start: 2023-01-18 | End: 2023-02-16

## 2023-01-18 RX ORDER — LIDOCAINE 40 MG/G
CREAM TOPICAL
Status: DISCONTINUED | OUTPATIENT
Start: 2023-01-18 | End: 2023-01-18 | Stop reason: HOSPADM

## 2023-01-18 ASSESSMENT — ACTIVITIES OF DAILY LIVING (ADL): ADLS_ACUITY_SCORE: 33

## 2023-01-18 NOTE — NURSING NOTE
Patient c/o daily leaking of fluid when laying down. Low normal fluid today on ultrasound. Plan per Dr. Vasquez is to [facility] for [facility]. Patient verbalized understanding and was given instructions on how to get to [facility]. Esther (labor charge) notified of patient arrival and given report. Dr. Whelan paged and notified of [facility] going to [facility] for evaluation.

## 2023-01-18 NOTE — PROGRESS NOTES
L&D Triage Progress Note    HPI: Dario Nathan is a 27 year old  at 23w4d by 6w0d US, here for leakage of fluid    Pregnancy notable for:  - Hx PPROM  - Elevated AFP  - Chorionic separation  - Hx HSV  - Hx COVID in pregnancy    Patient was sent over after her US today for evaluation for ROM. Patient reports she has had multiple episodes of fluid leakage while laying down over the past two weeks. Reports it has always been a trickle, no large gushes. The fluid is clear without odor. When she had PPROM in a prior pregnancy, reports the gushes felt similar but were much larger in amount. She states that she is otherwise feeling well today.  + FM, no ctx, VB. No vaginal itching, burning, or changes in vaginal discharge. No dysuria or hematuria. Endorses constipation.    Lab Results   Component Value Date    ABO O 2015    RH  Pos 2015    AS Negative 10/11/2022    HEPBANG Nonreactive 10/11/2022    CHPCRT Negative 10/11/2022    GCPCRT Negative 10/11/2022    TREPAB Non-reactive 2015    RUBELLAABIGG 4.25 10/14/2011    HGB 11.4 (L) 2022    HIV Non-Reactive 10/14/2011          OBHX:  OB History    Para Term  AB Living   5 2 1 1 2 2   SAB IAB Ectopic Multiple Live Births   2 0 0 0 2      # Outcome Date GA Lbr Azael/2nd Weight Sex Delivery Anes PTL Lv   5 Current            4 SAB 22     SAB      3  12/31/15 34w1d 01:45 / 00:22 3 kg (6 lb 9.8 oz) M Vag-Spont None Y JIM      Apgar1: 8  Apgar5: 9   2 SAB 2015 9w0d    SAB      1 Term 16 38w4d 06:45 / 01:13 2.977 kg (6 lb 9 oz) M Vag-Spont Local N JIM      Name: JOHN GARRISON      Apgar1: 9  Apgar5: 9       MedicalHX:  Past Medical History:   Diagnosis Date     Diabetes mellitus, type 2 (H) 11/10/2022     Genital herpes      Varicella        SurgicalHX:  Past Surgical History:   Procedure Laterality Date     NO HISTORY OF SURGERY         Medications:  No current facility-administered medications on file prior to  encounter.  acetone urine (KETOSTIX) test strip, Check urine ketones when you wake up every morning for 7 days. If negative everyday, reduce testing to once a week.  aspirin (ASA) 81 MG EC tablet, Take 1 tablet (81 mg) by mouth daily for 180 days  blood glucose (NO BRAND SPECIFIED) lancets standard, Use to test blood sugar 4 times daily or as directed.  blood glucose (NO BRAND SPECIFIED) test strip, Use to test blood sugar 4 times daily or as directed. Fasting and 1 hour postprandial  blood glucose monitoring (NO BRAND SPECIFIED) meter device kit, Use to test blood sugar 4 times daily or as directed.  insulin detemir (LEVEMIR PEN) 100 UNIT/ML pen, Inject 14 Units Subcutaneous At Bedtime  insulin pen needle (32G X 4 MM) 32G X 4 MM miscellaneous, Use 1 pen needles daily or as directed.  Prenatal Vit-DSS-Fe Cbn-FA (PRENATAL AD PO),         Allergies:  No Known Allergies    FamilyHX:    Family History   Problem Relation Age of Onset     Diabetes Mother      Diabetes Maternal Grandmother      Cancer Maternal Uncle         brain or spinal cord cancer?       SocialHX:  Social History     Socioeconomic History     Marital status:      Spouse name: None     Number of children: None     Years of education: None     Highest education level: None   Tobacco Use     Smoking status: Never     Smokeless tobacco: Never   Vaping Use     Vaping Use: Never used   Substance and Sexual Activity     Alcohol use: No     Drug use: No     Sexual activity: Yes     Partners: Male       ROS: 10-point ROS negative except as in HPI.    Physical Exam:  Vitals:    01/18/23 1327   BP: 108/64   BP Location: Right arm   Patient Position: Semi-Cardenas's   Cuff Size: Adult Regular   Resp: 18   Temp: 98.1  F (36.7  C)   TempSrc: Oral     GEN: resting comfortably in bed, NAD   CV: Regular rate, well perfused  PULM: On room air, no increased work of breathing  ABD: soft, gravid, non-tender, non-distended  SSE: Normal external genitalia, no pooling,  no leaking of fluid with valsalva, moderate amount of creamy, white discharge in vaginal vault, cervix visually long and closed, no bleeding present    NST:  FHT: baseline 145, moderate variability, + accels, no decels  TOCO: no ctx    A/P: 27 year old  at 23w4d by 6w0d US, here for r/o rupture of membranes. Exam normal, ROM plus negative, and fluid on US earlier today was normal with MVP of 2.4 cm. Therefore, low concern for ROM at this time. Wet prep positive for clue cells, will treat for BV given discharge on exam. Ok for discharge home, will follow up in clinic next week for repeat US to evaluate fluid..    # Rule out Rupture  # BV  - SSE reassuring, ROM plus negative  - wet prep c/w BV, prescription for flagyl sent  - Repeat US next week for repeat fluid evaluation    # Fetal Well-Being  - NST reactive and reassuring, appropriate for GA    Dispo: home    Patient discussed with Dr. Whelan and Dr. Solis with [facility].    Erin William MD  OB/GYN Resident, PGY-2        Physician Attestation   I, Cynthia Whelan, saw this patient with the resident and agree with the resident's findings and plan of care as documented in the note.      I personally reviewed vital signs, labs and fetal heart tones and toco.    Key findings: patient with with h/o PPROM in previous pregnancy and now with concerns for LOF.  US shows normal fluid and wet prep is positive for BV,  There is no pooling to suggest PPROM and ROM plus is negative. Reviewed with Dr Solis and plan is to discharge home with weekly US follow-up for fluid check.   Fetal NST is reassuring.      Cynthia Whelan MD  Date of Service (when I saw the patient): 23

## 2023-01-18 NOTE — PROGRESS NOTES
Data: [facility]   Reason for maternal/fetal assessment per patient is Rule out rupture of membranes  . Patient is a . Prenatal record reviewed.      OB History    Para Term  AB Living   5 2 1 1 2 2   SAB IAB Ectopic Multiple Live Births   2 0 0 0 2      # Outcome Date GA Lbr Azael/2nd Weight Sex Delivery Anes PTL Lv   5 Current            4 SAB 22     SAB      3  12/31/15 34w1d 01:45 / 00:22 3 kg (6 lb 9.8 oz) M Vag-Spont None Y JIM      Apgar1: 8  Apgar5: 9   2 SAB 2015 9w0d    SAB      1 Term 12 38w4d 06:45 / 01:13 2.977 kg (6 lb 9 oz) M Vag-Spont Local N JIM      Name: JOHN GARRISON      Apgar1: 9  Apgar5: 9      Medical History:   Past Medical History:   Diagnosis Date     Diabetes mellitus, type 2 (H) 11/10/2022     Genital herpes      Varicella    . Gestational Age 23w4d. VSS. Cervix: closed by speculum.  Fetal movement present. Patient denies cramping, backache, pelvic pressure, UTI symptoms, GI problems, bloody show, vaginal bleeding, edema, headache, visual disturbances, epigastric or URQ pain, abdominal pain. Endorses leaking of fluid and vaginal discharge. No support persons present.  Action: Verbal consent for EFM. Triage assessment completed. EFM applied. Uterine assessment and fetal assessment: Presumed adequate fetal oxygenation documented (see flow record). Patient education pamphlets given on fetal movement counts and BV. Patient instructed to report change in fetal movement, vaginal leaking of fluid or bleeding, abdominal pain, or any concerns related to the pregnancy to her nurse/physician.   Response: Dr. William and Dr. Whelan informed of arrival, chief complaint, EFM, and assessment. Plan per provider is discharge home with prescription for BV. Patient verbalized understanding of education and verbalized agreement with plan. Discharged ambulatory at 1640.

## 2023-01-18 NOTE — PROVIDER NOTIFICATION
"   23 1419   Provider Notification   Provider Name/Title Higinio HORNERY3 MD   Method of Notification Electronic Page   Request Evaluate in Person   Notification Reason Patient Arrived     Paged MD, \"pt arrived  23.4 wks c/o PPROM. ROM+ sent. FHR cat I, no ctx.\"  "

## 2023-01-19 DIAGNOSIS — Z36.2 ENCOUNTER FOR FOLLOW-UP ULTRASOUND OF FETAL ANATOMY: ICD-10-CM

## 2023-01-19 DIAGNOSIS — O24.112 PREGNANCY COMPLICATED BY PRE-EXISTING TYPE 2 DIABETES, SECOND TRIMESTER: ICD-10-CM

## 2023-01-19 DIAGNOSIS — O28.8 AFI (AMNIOTIC FLUID INDEX) BORDERLINE LOW: Primary | ICD-10-CM

## 2023-01-19 LAB
C TRACH DNA SPEC QL PROBE+SIG AMP: NEGATIVE
GP B STREP DNA SPEC QL NAA+PROBE: NEGATIVE
N GONORRHOEA DNA SPEC QL NAA+PROBE: NEGATIVE

## 2023-01-19 NOTE — PROGRESS NOTES
"Please see \"Imaging\" tab under \"Chart Review\" for details of today's visit.    David Vasquez    "

## 2023-01-20 ENCOUNTER — VIRTUAL VISIT (OUTPATIENT)
Dept: EDUCATION SERVICES | Facility: CLINIC | Age: 28
End: 2023-01-20
Payer: COMMERCIAL

## 2023-01-20 DIAGNOSIS — O24.111 PRE-EXISTING TYPE 2 DIABETES MELLITUS DURING PREGNANCY IN FIRST TRIMESTER: Primary | ICD-10-CM

## 2023-01-20 LAB — BACTERIA UR CULT: NORMAL

## 2023-01-20 PROCEDURE — 98968 PH1 ASSMT&MGMT NQHP 21-30: CPT | Mod: TEL | Performed by: DIETITIAN, REGISTERED

## 2023-01-20 RX ORDER — PEN NEEDLE, DIABETIC 30 GX3/16"
1 NEEDLE, DISPOSABLE MISCELLANEOUS 2 TIMES DAILY
Qty: 100 EACH | Refills: 4 | Status: SHIPPED | OUTPATIENT
Start: 2023-01-20

## 2023-01-20 RX ORDER — INSULIN HUMAN 100 [IU]/ML
14 INJECTION, SUSPENSION SUBCUTANEOUS AT BEDTIME
Qty: 5 ML | Refills: 4 | Status: ON HOLD | OUTPATIENT
Start: 2023-01-20 | End: 2023-02-16

## 2023-01-20 RX ORDER — INSULIN LISPRO 100 [IU]/ML
5 INJECTION, SOLUTION INTRAVENOUS; SUBCUTANEOUS
Qty: 5 ML | Refills: 3 | Status: ON HOLD | OUTPATIENT
Start: 2023-01-20 | End: 2023-02-16

## 2023-01-20 NOTE — PROGRESS NOTES
Diabetes Education Follow-up    Subjective/Objective:    Dario Nathan had an appt today for BG review. Last date of communication was: 1/13. Discussed last week if dinner meal numbers not improved we would need to start some insulin.     Diabetes is being managed with   Lifestyle (diet/activity), Diabetes Medications   Diabetes Medication(s)     Insulin       insulin detemir (LEVEMIR PEN) 100 UNIT/ML pen    Inject 14 Units Subcutaneous At Bedtime          BG/Food Log:   *checks 2 hours after  Date Ketones Fasting Post Breakfast Post Lunch Post Supper   1/14  73 94 110 149   1/15  82 102  119   1/16  94 85 138    1/17  103 (1012) 98 111 144   1/18  105 (1015AM) 128 115 132   1/19  92 (@1:30pm) 132 110    1/20 possibly trace 142 (905am)        Breakfast: eggs OR quesadilla OR piece bread  1/19- breakfast:   1:19-Dinner-orange +1.5 mini burritos + granola bar + pork skin (ate at midnight). Thinks     Assessment:  Post-dinner BG continue to be above goals. We will plan to start insulin at dinner 5u. Suspect this may improve fasting numbers a bit so will hold off on increasing Levemir. Dario reports her insurance is denying Levemir and requesting Toujeo OR Lantus, will change her to NPH and hopefully insurance covers this, if not will come up with another plan. We have scheduled f/u next Thursday however discussed if BG continue to be above goals should f/u sooner. Ordered urgent endo referral, discussed this with Dario.    Fasting blood glucose: 57% in target.  After breakfast glucose: 67% in target.  After lunch glucose: 80% in target.  After dinner glucose: 25% in target.    Plan/Response:  See Patient Instructions for co-developed, patient-stated behavior change goals.  Recommend that patient begin 5u Novolog insulin with dinner meal, change to NPH 14u at bedtime.   Recommend referral to Endocrinology    Irena Espinal RD, HENRRY, [facility]     Time 25 minutes    Any diabetes medication dose changes were made via the CDE  Protocol and Collaborative Practice Agreement with the patient's OB/GYN provider. A copy of this encounter was shared with the provider.

## 2023-01-20 NOTE — LETTER
1/20/2023         RE: Dario Nathan  [address] [facility] [address]        Dear Colleague,    Thank you for referring your patient, Dario Nathan, to the [facility]. Please see a copy of my visit note below.    Diabetes Education Follow-up    Subjective/Objective:    Dario Nathan had an appt today for BG review. Last date of communication was: 1/13. Discussed last week if dinner meal numbers not improved we would need to start some insulin.     Diabetes is being managed with   Lifestyle (diet/activity), Diabetes Medications   Diabetes Medication(s)     Insulin       insulin detemir (LEVEMIR PEN) 100 UNIT/ML pen    Inject 14 Units Subcutaneous At Bedtime          BG/Food Log:   *checks 2 hours after  Date Ketones Fasting Post Breakfast Post Lunch Post Supper   1/14  73 94 110 149   1/15  82 102  119   1/16  94 85 138    1/17  103 (1012) 98 111 144   1/18  105 (1015AM) 128 115 132   1/19  92 (@1:30pm) 132 110    1/20 possibly trace 142 (905am)        Breakfast: eggs OR quesadilla OR piece bread  1/19- breakfast:   1:19-Dinner-orange +1.5 mini burritos + granola bar + pork skin (ate at midnight). Thinks     Assessment:  Post-dinner BG continue to be above goals. We will plan to start insulin at dinner 5u. Suspect this may improve fasting numbers a bit so will hold off on increasing Levemir. Dario reports her insurance is denying Levemir and requesting Toujeo OR Lantus, will change her to NPH and hopefully insurance covers this, if not will come up with another plan. We have scheduled f/u next Thursday however discussed if BG continue to be above goals should f/u sooner. Ordered urgent endo referral, discussed this with Dario.    Fasting blood glucose: 57% in target.  After breakfast glucose: 67% in target.  After lunch glucose: 80% in target.  After dinner glucose: 25% in target.    Plan/Response:  See Patient Instructions for co-developed, patient-stated behavior change  goals.  Recommend that patient begin 5u Novolog insulin with dinner meal, change to NPH 14u at bedtime.   Recommend referral to Endocrinology    Irena Espinal RD, HENRRY, [facility]        Any diabetes medication dose changes were made via the CDE Protocol and Collaborative Practice Agreement with the patient's OB/GYN provider. A copy of this encounter was shared with the provider.

## 2023-01-20 NOTE — TELEPHONE ENCOUNTER
Diabetes Ed met with Dario today. Her post-meal BG's are above goals so will need to start some insulin for this.     Also she found out Levemir is no longer covered with her insurance. Will change to NPH.    Please see pended orders.    Thanks!    Irena Espinal RD, LD, [facility]

## 2023-01-22 ENCOUNTER — NURSE TRIAGE (OUTPATIENT)
Dept: NURSING | Facility: CLINIC | Age: 28
End: 2023-01-22
Payer: COMMERCIAL

## 2023-01-22 NOTE — TELEPHONE ENCOUNTER
Triage Call:    Missed taking her insulin last night  She normally takes 16 units at bedtime of long acting insulin    This morning 121 blood glucose  Ate a thin bagel, fried egg and some meat  Blood glucose was: 181 twenty minutes ago    Disposition: Pt was given the care advice and plans to monitor her blood glucose and sx and then will take her insulin as scheduled today.     Mariann Giron RN  [facility] Nurse Advisor 1:06 PM 1/22/2023      Reason for Disposition    Blood glucose  mg/dL (3.9 -13.3 mmol/L)    Additional Information    Negative: Unconscious or difficult to awaken    Negative: Acting confused (e.g., disoriented, slurred speech)    Negative: Very weak (e.g., can't stand)    Negative: Sounds like a life-threatening emergency to the triager    Negative: [1] Vomiting AND [2] signs of dehydration (e.g., very dry mouth, lightheaded, dark urine)    Negative: [1] Blood glucose > 240 mg/dL (13.3 mmol/L) AND [2] rapid breathing    Negative: Blood glucose > 500 mg/dL (27.8 mmol/L)    Negative: [1] Blood glucose > 240 mg/dL (13.3 mmol/L) AND [2] urine ketones moderate-large (or more than 1+)    Negative: [1] Blood glucose > 240 mg/dL (13.3 mmol/L) AND [2] blood ketones > 1.4 mmol/L    Negative: [1] Blood glucose > 240 mg/dL (13.3 mmol/L) AND [2] vomiting AND [3] unable to check for ketones (in blood or urine)    Negative: [1] New-onset diabetes suspected (e.g., frequent urination, weak, weight loss) AND [2] vomiting or rapid breathing    Negative: Vomiting lasts > 4 hours    Negative: Patient sounds very sick or weak to the triager    Negative: Fever > 100.4 F (38.0 C)    Negative: Blood glucose > 400 mg/dL (22.2 mmol/L)    Negative: [1] Blood glucose > 300 mg/dL (16.7 mmol/L) AND [2] two or more times in a row    Negative: Urine ketones moderate - large (or blood ketones > 1.4 mmol/L)    Negative: [1] Caller has URGENT medication or insulin pump question AND [2] triager unable to answer question     Negative: [1] Symptoms of high blood sugar (e.g., frequent urination, weak, weight loss) AND [2] not able to test blood glucose    Negative: New-onset diabetes suspected (e.g., frequent urination, weakness, weight loss)    Negative: [1] Caller has NON-URGENT medication or insulin pump question AND [2] triager unable to answer question    Negative: [1] Blood glucose > 300 mg/dL (16.7 mmol/L) AND [2] uses insulin (e.g., insulin-dependent, all people with type 1 diabetes)    Negative: [1] Blood glucose 240 - 300 mg/dL (13.3 - 16.7 mmol/L) AND [2] uses insulin (e.g., insulin-dependent, all people with type 1 diabetes)    Negative: [1] Blood glucose > 300 mg/dL (16.7 mmol/L) AND [2] does not  use insulin (e.g., not insulin-dependent; most people with type 2 diabetes)    Negative: [1] Blood glucose 240 - 300 mg/dL (13.3 - 16.7 mmol/L) AND [2] does not  use insulin (e.g., not insulin-dependent; most people with type 2 diabetes)    Protocols used: DIABETES - HIGH BLOOD SUGAR-A-

## 2023-01-24 NOTE — TELEPHONE ENCOUNTER
RECORDS RECEIVED FROM: internal    DATE RECEIVED: 1.31.23   NOTES (FOR ALL VISITS) STATUS DETAILS   OFFICE NOTES from referring provider internal  Irena Espinal   OFFICE NOTES from other specialist internal  11/7/22, 10.31.22,  Educ        MEDICATION LIST internal       LABS     DIABETES: HBGA1C, CREATININE, FASTING LIPIDS, MICROALBUMIN URINE, POTASSIUM, TSH, T4    THYROID: TSH, T4, CBC, THYRODLONULIN, TOTAL T3, FREE T4, CALCITONIN, CEA internal  CREATININE- 12.5.22  Cbc- 12.5.22  HBGA1C- 12.5.22  TSH/T4- 5/18/22  Bmp- 5/7/22  Urine culture- 1/18/23  Glucose meter- 1/18/23

## 2023-01-26 ENCOUNTER — HOSPITAL ENCOUNTER (INPATIENT)
Facility: CLINIC | Age: 28
LOS: 22 days | Discharge: HOME HEALTH | End: 2023-02-17
Attending: OBSTETRICS & GYNECOLOGY | Admitting: OBSTETRICS & GYNECOLOGY
Payer: COMMERCIAL

## 2023-01-26 ENCOUNTER — OFFICE VISIT (OUTPATIENT)
Dept: MATERNAL FETAL MEDICINE | Facility: CLINIC | Age: 28
End: 2023-01-26
Attending: OBSTETRICS & GYNECOLOGY
Payer: COMMERCIAL

## 2023-01-26 ENCOUNTER — HOSPITAL ENCOUNTER (OUTPATIENT)
Dept: ULTRASOUND IMAGING | Facility: CLINIC | Age: 28
Discharge: HOME | End: 2023-01-26
Attending: OBSTETRICS & GYNECOLOGY
Payer: COMMERCIAL

## 2023-01-26 ENCOUNTER — VIRTUAL VISIT (OUTPATIENT)
Dept: EDUCATION SERVICES | Facility: CLINIC | Age: 28
End: 2023-01-26
Payer: COMMERCIAL

## 2023-01-26 DIAGNOSIS — Z86.32 HISTORY OF GESTATIONAL DIABETES MELLITUS: ICD-10-CM

## 2023-01-26 DIAGNOSIS — Z79.4 TYPE 2 DIABETES MELLITUS WITHOUT COMPLICATION, WITH LONG-TERM CURRENT USE OF INSULIN (H): ICD-10-CM

## 2023-01-26 DIAGNOSIS — O24.414 INSULIN CONTROLLED GESTATIONAL DIABETES MELLITUS (GDM) DURING PREGNANCY, ANTEPARTUM: Primary | ICD-10-CM

## 2023-01-26 DIAGNOSIS — O09.899 HISTORY OF PREMATURE DELIVERY, CURRENTLY PREGNANT: ICD-10-CM

## 2023-01-26 DIAGNOSIS — Z98.891 S/P CESAREAN SECTION: Primary | ICD-10-CM

## 2023-01-26 DIAGNOSIS — O41.00X0 OLIGOHYDRAMNIOS, ANTEPARTUM, SINGLE OR UNSPECIFIED FETUS: Primary | ICD-10-CM

## 2023-01-26 DIAGNOSIS — E11.9 TYPE 2 DIABETES MELLITUS WITHOUT COMPLICATION, WITH LONG-TERM CURRENT USE OF INSULIN (H): ICD-10-CM

## 2023-01-26 LAB
ABO/RH(D): NORMAL
ANTIBODY SCREEN: NEGATIVE
BASOPHILS # BLD AUTO: 0 10E3/UL (ref 0–0.2)
BASOPHILS NFR BLD AUTO: 0 %
CLUE CELLS: PRESENT
CRYSTALS AMN MICRO: NORMAL
CRYSTALS AMN MICRO: NORMAL
EOSINOPHIL # BLD AUTO: 0 10E3/UL (ref 0–0.7)
EOSINOPHIL NFR BLD AUTO: 0 %
ERYTHROCYTE [DISTWIDTH] IN BLOOD BY AUTOMATED COUNT: 13 % (ref 10–15)
GLUCOSE BLDC GLUCOMTR-MCNC: 109 MG/DL (ref 70–99)
GLUCOSE BLDC GLUCOMTR-MCNC: 168 MG/DL (ref 70–99)
GLUCOSE BLDC GLUCOMTR-MCNC: 193 MG/DL (ref 70–99)
GLUCOSE BLDC GLUCOMTR-MCNC: 87 MG/DL (ref 70–99)
HCT VFR BLD AUTO: 35.4 % (ref 35–47)
HGB BLD-MCNC: 11.6 G/DL (ref 11.7–15.7)
IMM GRANULOCYTES # BLD: 0.1 10E3/UL
IMM GRANULOCYTES NFR BLD: 1 %
LYMPHOCYTES # BLD AUTO: 1.7 10E3/UL (ref 0.8–5.3)
LYMPHOCYTES NFR BLD AUTO: 17 %
MCH RBC QN AUTO: 27.6 PG (ref 26.5–33)
MCHC RBC AUTO-ENTMCNC: 32.8 G/DL (ref 31.5–36.5)
MCV RBC AUTO: 84 FL (ref 78–100)
MONOCYTES # BLD AUTO: 0.5 10E3/UL (ref 0–1.3)
MONOCYTES NFR BLD AUTO: 5 %
NEUTROPHILS # BLD AUTO: 7.5 10E3/UL (ref 1.6–8.3)
NEUTROPHILS NFR BLD AUTO: 77 %
NRBC # BLD AUTO: 0 10E3/UL
NRBC BLD AUTO-RTO: 0 /100
PLATELET # BLD AUTO: 299 10E3/UL (ref 150–450)
RBC # BLD AUTO: 4.21 10E6/UL (ref 3.8–5.2)
RUPTURE OF FETAL MEMBRANES BY ROM PLUS: NEGATIVE
SARS-COV-2 RNA RESP QL NAA+PROBE: NEGATIVE
SPECIMEN EXPIRATION DATE: NORMAL
TRICHOMONAS, WET PREP: ABNORMAL
WBC # BLD AUTO: 9.8 10E3/UL (ref 4–11)
WBC'S/HIGH POWER FIELD, WET PREP: ABNORMAL
YEAST, WET PREP: ABNORMAL

## 2023-01-26 PROCEDURE — C9803 HOPD COVID-19 SPEC COLLECT: HCPCS

## 2023-01-26 PROCEDURE — 82962 GLUCOSE BLOOD TEST: CPT

## 2023-01-26 PROCEDURE — 76815 OB US LIMITED FETUS(S): CPT

## 2023-01-26 PROCEDURE — 84112 EVAL AMNIOTIC FLUID PROTEIN: CPT

## 2023-01-26 PROCEDURE — U0005 INFEC AGEN DETEC AMPLI PROBE: HCPCS | Performed by: STUDENT IN AN ORGANIZED HEALTH CARE EDUCATION/TRAINING PROGRAM

## 2023-01-26 PROCEDURE — 86901 BLOOD TYPING SEROLOGIC RH(D): CPT | Performed by: STUDENT IN AN ORGANIZED HEALTH CARE EDUCATION/TRAINING PROGRAM

## 2023-01-26 PROCEDURE — G0463 HOSPITAL OUTPT CLINIC VISIT: HCPCS

## 2023-01-26 PROCEDURE — 120N000002 HC R&B MED SURG/OB UMMC

## 2023-01-26 PROCEDURE — 85025 COMPLETE CBC W/AUTO DIFF WBC: CPT | Performed by: STUDENT IN AN ORGANIZED HEALTH CARE EDUCATION/TRAINING PROGRAM

## 2023-01-26 PROCEDURE — 250N000013 HC RX MED GY IP 250 OP 250 PS 637: Performed by: STUDENT IN AN ORGANIZED HEALTH CARE EDUCATION/TRAINING PROGRAM

## 2023-01-26 PROCEDURE — 87210 SMEAR WET MOUNT SALINE/INK: CPT

## 2023-01-26 PROCEDURE — 250N000011 HC RX IP 250 OP 636: Performed by: STUDENT IN AN ORGANIZED HEALTH CARE EDUCATION/TRAINING PROGRAM

## 2023-01-26 PROCEDURE — 76815 OB US LIMITED FETUS(S): CPT | Mod: 26 | Performed by: OBSTETRICS & GYNECOLOGY

## 2023-01-26 PROCEDURE — 250N000012 HC RX MED GY IP 250 OP 636 PS 637: Performed by: STUDENT IN AN ORGANIZED HEALTH CARE EDUCATION/TRAINING PROGRAM

## 2023-01-26 PROCEDURE — 99222 1ST HOSP IP/OBS MODERATE 55: CPT | Mod: GC | Performed by: OBSTETRICS & GYNECOLOGY

## 2023-01-26 RX ORDER — METOCLOPRAMIDE 10 MG/1
10 TABLET ORAL EVERY 6 HOURS PRN
Status: DISCONTINUED | OUTPATIENT
Start: 2023-01-26 | End: 2023-02-14 | Stop reason: HOSPADM

## 2023-01-26 RX ORDER — ONDANSETRON 4 MG/1
4 TABLET, ORALLY DISINTEGRATING ORAL EVERY 6 HOURS PRN
Status: DISCONTINUED | OUTPATIENT
Start: 2023-01-26 | End: 2023-02-14 | Stop reason: HOSPADM

## 2023-01-26 RX ORDER — ACYCLOVIR 400 MG/1
400 TABLET ORAL 3 TIMES DAILY
Status: DISCONTINUED | OUTPATIENT
Start: 2023-01-26 | End: 2023-02-14

## 2023-01-26 RX ORDER — PRENATAL VIT/IRON FUM/FOLIC AC 27MG-0.8MG
1 TABLET ORAL DAILY
Status: DISCONTINUED | OUTPATIENT
Start: 2023-01-26 | End: 2023-01-27

## 2023-01-26 RX ORDER — SIMETHICONE 80 MG
160 TABLET,CHEWABLE ORAL EVERY 4 HOURS PRN
Status: DISCONTINUED | OUTPATIENT
Start: 2023-01-26 | End: 2023-02-14 | Stop reason: HOSPADM

## 2023-01-26 RX ORDER — AMOXICILLIN 250 MG
1 CAPSULE ORAL 2 TIMES DAILY
Status: DISCONTINUED | OUTPATIENT
Start: 2023-01-26 | End: 2023-02-14 | Stop reason: HOSPADM

## 2023-01-26 RX ORDER — DEXTROSE MONOHYDRATE 25 G/50ML
25-50 INJECTION, SOLUTION INTRAVENOUS
Status: DISCONTINUED | OUTPATIENT
Start: 2023-01-26 | End: 2023-02-14

## 2023-01-26 RX ORDER — AZITHROMYCIN 500 MG/1
1000 TABLET, FILM COATED ORAL ONCE
Status: COMPLETED | OUTPATIENT
Start: 2023-01-26 | End: 2023-01-26

## 2023-01-26 RX ORDER — ONDANSETRON 2 MG/ML
4 INJECTION INTRAMUSCULAR; INTRAVENOUS EVERY 6 HOURS PRN
Status: DISCONTINUED | OUTPATIENT
Start: 2023-01-26 | End: 2023-02-14 | Stop reason: HOSPADM

## 2023-01-26 RX ORDER — LIDOCAINE 40 MG/G
CREAM TOPICAL
Status: DISCONTINUED | OUTPATIENT
Start: 2023-01-26 | End: 2023-01-26

## 2023-01-26 RX ORDER — PROCHLORPERAZINE MALEATE 10 MG
10 TABLET ORAL EVERY 6 HOURS PRN
Status: DISCONTINUED | OUTPATIENT
Start: 2023-01-26 | End: 2023-02-14 | Stop reason: HOSPADM

## 2023-01-26 RX ORDER — AMOXICILLIN 250 MG/1
250 CAPSULE ORAL 3 TIMES DAILY
Status: COMPLETED | OUTPATIENT
Start: 2023-01-28 | End: 2023-02-02

## 2023-01-26 RX ORDER — DIPHENHYDRAMINE HCL 25 MG
25 CAPSULE ORAL EVERY 6 HOURS PRN
Status: DISCONTINUED | OUTPATIENT
Start: 2023-01-26 | End: 2023-02-14 | Stop reason: HOSPADM

## 2023-01-26 RX ORDER — HYDROXYZINE HYDROCHLORIDE 50 MG/1
50 TABLET, FILM COATED ORAL
Status: DISCONTINUED | OUTPATIENT
Start: 2023-01-26 | End: 2023-02-14 | Stop reason: HOSPADM

## 2023-01-26 RX ORDER — METOCLOPRAMIDE HYDROCHLORIDE 5 MG/ML
10 INJECTION INTRAMUSCULAR; INTRAVENOUS EVERY 6 HOURS PRN
Status: DISCONTINUED | OUTPATIENT
Start: 2023-01-26 | End: 2023-02-14 | Stop reason: HOSPADM

## 2023-01-26 RX ORDER — DIPHENHYDRAMINE HYDROCHLORIDE 50 MG/ML
25 INJECTION INTRAMUSCULAR; INTRAVENOUS EVERY 6 HOURS PRN
Status: DISCONTINUED | OUTPATIENT
Start: 2023-01-26 | End: 2023-02-14 | Stop reason: HOSPADM

## 2023-01-26 RX ORDER — MAGNESIUM HYDROXIDE/ALUMINUM HYDROXICE/SIMETHICONE 120; 1200; 1200 MG/30ML; MG/30ML; MG/30ML
30 SUSPENSION ORAL
Status: DISCONTINUED | OUTPATIENT
Start: 2023-01-26 | End: 2023-02-14 | Stop reason: HOSPADM

## 2023-01-26 RX ORDER — BETAMETHASONE SODIUM PHOSPHATE AND BETAMETHASONE ACETATE 3; 3 MG/ML; MG/ML
12 INJECTION, SUSPENSION INTRA-ARTICULAR; INTRALESIONAL; INTRAMUSCULAR; SOFT TISSUE EVERY 24 HOURS
Status: COMPLETED | OUTPATIENT
Start: 2023-01-26 | End: 2023-01-27

## 2023-01-26 RX ORDER — PROCHLORPERAZINE 25 MG
25 SUPPOSITORY, RECTAL RECTAL EVERY 12 HOURS PRN
Status: DISCONTINUED | OUTPATIENT
Start: 2023-01-26 | End: 2023-02-14 | Stop reason: HOSPADM

## 2023-01-26 RX ORDER — AMOXICILLIN 250 MG
2 CAPSULE ORAL 2 TIMES DAILY
Status: DISCONTINUED | OUTPATIENT
Start: 2023-01-26 | End: 2023-02-14 | Stop reason: HOSPADM

## 2023-01-26 RX ORDER — AMPICILLIN 2 G/1
2 INJECTION, POWDER, FOR SOLUTION INTRAVENOUS EVERY 6 HOURS
Status: COMPLETED | OUTPATIENT
Start: 2023-01-26 | End: 2023-01-28

## 2023-01-26 RX ORDER — LIDOCAINE 40 MG/G
CREAM TOPICAL
Status: DISCONTINUED | OUTPATIENT
Start: 2023-01-26 | End: 2023-02-14 | Stop reason: HOSPADM

## 2023-01-26 RX ORDER — ACETAMINOPHEN 325 MG/1
650 TABLET ORAL EVERY 4 HOURS PRN
Status: DISCONTINUED | OUTPATIENT
Start: 2023-01-26 | End: 2023-02-14 | Stop reason: HOSPADM

## 2023-01-26 RX ORDER — NICOTINE POLACRILEX 4 MG
15-30 LOZENGE BUCCAL
Status: DISCONTINUED | OUTPATIENT
Start: 2023-01-26 | End: 2023-02-14

## 2023-01-26 RX ADMIN — INSULIN DETEMIR 16 UNITS: 100 INJECTION, SOLUTION SUBCUTANEOUS at 22:16

## 2023-01-26 RX ADMIN — BETAMETHASONE SODIUM PHOSPHATE AND BETAMETHASONE ACETATE 12 MG: 3; 3 INJECTION, SUSPENSION INTRA-ARTICULAR; INTRALESIONAL; INTRAMUSCULAR at 18:20

## 2023-01-26 RX ADMIN — ACYCLOVIR 400 MG: 400 TABLET ORAL at 22:24

## 2023-01-26 RX ADMIN — AMPICILLIN SODIUM 2 G: 2 INJECTION, POWDER, FOR SOLUTION INTRAMUSCULAR; INTRAVENOUS at 17:58

## 2023-01-26 RX ADMIN — AZITHROMYCIN 1000 MG: 500 TABLET, FILM COATED ORAL at 18:19

## 2023-01-26 RX ADMIN — PRENATAL VITAMINS-IRON FUMARATE 27 MG IRON-FOLIC ACID 0.8 MG TABLET 1 TABLET: at 19:42

## 2023-01-26 ASSESSMENT — ACTIVITIES OF DAILY LIVING (ADL)
ADLS_ACUITY_SCORE: 20
ADLS_ACUITY_SCORE: 20
ADLS_ACUITY_SCORE: 33
ADLS_ACUITY_SCORE: 20
ADLS_ACUITY_SCORE: 20

## 2023-01-26 NOTE — PROVIDER NOTIFICATION
01/26/23 1620   Provider Notification   Provider Name/Title Dr Antonio/Stewart   Method of Notification At Bedside   Request Evaluate in Person   Notification Reason Other (Comment)  (SSE)     Bedside sterile speculum performed and vaginal pooling noted. Ferning done to confirmed ROM.

## 2023-01-26 NOTE — LETTER
1/26/2023         RE: Dario Nathan  [address] [facility] [place] [address]        Dear Colleague,    Thank you for referring your patient, Dario Nathan, to the [facility]. Please see a copy of my visit note below.    Gestational Diabetes Follow-up    Subjective/Objective:    Dario Nathan had appt today at 11 but forgot she also had US so we rescheduled for this afternoon. When I called her she reported being admitted to L&D d/t abnormality with US. She did not have her BG numbers with her and since she was admitted we did not do a full visit. She did report not yet starting the Humalog with dinner and is stilling using Levemir (once this runs out will start NPH as her insurance will not cover refills of Levemir). Last date of communication was: 1/20    Gestational diabetes is being managed with diet, activity and medications    Taking diabetes medications:   yes:     Diabetes Medication(s)     Insulin       insulin lispro (HUMALOG KWIKPEN) 100 UNIT/ML (1 unit dial) KWIKPEN    Inject 5 Units Subcutaneous daily (with dinner)     insulin NPH (HUMULIN N KWIKPEN) 100 UNIT/ML injection    Inject 14 Units Subcutaneous At Bedtime          Estimated Date of Delivery: May 13, 2023    Plan/Response:  Encouraged her to send in BG numbers via [employer] once she is discharged and able.   She has endocrinology appointment on 1/31.   We will reach out to schedule f/u diabetes Ed appointment for after her endo appt.     Irena Espinal RD, LD, CDCES        Any diabetes medication dose changes were made via the CDE Protocol and Collaborative Practice Agreement with the patient's OB/GYN provider. A copy of this encounter was shared with the provider.

## 2023-01-26 NOTE — H&P
L&D History and Physical   2023  Dario Nathan  [id number]      HPI:   Dario Nathan is a 27 year old  at 24w5d by 6w0d US who presents from [facility] clinic for leakage of fluid. Pregnancy complicated as below.    Patient reports she has had intermittent leakage of fluid over the past 3 weeks. She reports it happens most frequently when she moves while laying down, and will feel a small gush. Never enough to soak through her underwear. It was happening about once per day, but has now increased in frequency to about 3 times per day. The fluid is clear without odor. Patient was seen last week for leakage of fluid as well, and at that time her work-up was negative for rupture of membranes. She was found to have BV and just finished her course of flagyl. She had a repeat US today which showed very minimal fluid, and therefore was sent here from clinic.    Patient states she is otherwise feeling well today.  Denies VB. Good FM. No contractions. No fevers or chills.    Her pregnancy has been complicated by:  - Hx PPROM  - Type 2 diabetes  - Elevated AFP  - Chorionic separation  - Hx HSV  - Hx COVID in pregnancy    OBHX:   OB History    Para Term  AB Living   5 2 1 1 2 2   SAB IAB Ectopic Multiple Live Births   2 0 0 0 2      # Outcome Date GA Lbr Azael/2nd Weight Sex Delivery Anes PTL Lv   5 Current            4 SAB 22     SAB      3  12/31/15 34w1d 01:45 / 00:22 3 kg (6 lb 9.8 oz) M Vag-Spont None Y JIM      Apgar1: 8  Apgar5: 9   2 SAB 2015 9w0d    SAB      1 Term 12 38w4d 06:45 / 01:13 2.977 kg (6 lb 9 oz) M Vag-Spont Local N JIM      Name: JOHN GARRISON      Apgar1: 9  Apgar5: 9       Past Medical History:   Diagnosis Date     Diabetes mellitus, type 2 (H) 11/10/2022     Genital herpes      Varicella        Past Surgical History:   Procedure Laterality Date     NO HISTORY OF SURGERY         Medications:   No current facility-administered medications on file prior to  encounter.  aspirin (ASA) 81 MG EC tablet, Take 1 tablet (81 mg) by mouth daily for 180 days  insulin NPH (HUMULIN N KWIKPEN) 100 UNIT/ML injection, Inject 14 Units Subcutaneous At Bedtime  Prenatal Vit-DSS-Fe Cbn-FA (PRENATAL AD PO),   acetone urine (KETOSTIX) test strip, Check urine ketones when you wake up every morning for 7 days. If negative everyday, reduce testing to once a week.  blood glucose (NO BRAND SPECIFIED) lancets standard, Use to test blood sugar 4 times daily or as directed.  blood glucose (NO BRAND SPECIFIED) test strip, Use to test blood sugar 4 times daily or as directed. Fasting and 1 hour postprandial  blood glucose monitoring (NO BRAND SPECIFIED) meter device kit, Use to test blood sugar 4 times daily or as directed.  insulin lispro (HUMALOG KWIKPEN) 100 UNIT/ML (1 unit dial) KWIKPEN, Inject 5 Units Subcutaneous daily (with dinner)  insulin pen needle (32G X 4 MM) 32G X 4 MM miscellaneous, Use 1 pen needles daily or as directed.  Insulin Pen Needle (PEN NEEDLES) 32G X 4 MM MISC, 1 each 2 times daily Use for NPH and Novolog insulin  [] metroNIDAZOLE (FLAGYL) 500 MG tablet, Take 1 tablet (500 mg) by mouth 2 times daily for 7 days        No Known Allergies    Family History   Problem Relation Age of Onset     Diabetes Mother      Diabetes Maternal Grandmother      Cancer Maternal Uncle         brain or spinal cord cancer?       SocialHX:   Social History     Tobacco Use     Smoking status: Never     Smokeless tobacco: Never   Vaping Use     Vaping Use: Never used   Substance Use Topics     Alcohol use: No     Drug use: No       ROS: 10-point ROS negative except as indicated in HPI.    Physical Exam:  Vitals:    23 1322   BP: 105/52   Pulse: 81   Resp: 16   Temp: 98.4  F (36.9  C)   TempSrc: Oral     General: alert, oriented female, resting in bed in NAD  CV: well perfused  Lungs: breathing comfortably on room air  Abdomen: soft, gravid, non-tender  Extremities: bilateral lower  extremities non-tender with trace edema  SSE: normal external genitalia, moderate amount of thin, white fluid present in vaginal vault, no obvious leakage of fluid from cervix with valsalva, cervix visually long and closed    FHT: baseline 145, moderate variability, no accelerations, no decelerations, discontinuous tracing  [place]: no contractions    Prenatal Labs:   Lab Results   Component Value Date    ABO O 12/30/2015    RH  Pos 12/30/2015    AS Negative 10/11/2022    HEPBANG Nonreactive 10/11/2022    CHPCRT Negative 10/11/2022    GCPCRT Negative 10/11/2022    TREPAB Non-reactive 07/01/2015    RUBELLAABIGG 4.25 10/14/2011    HGB 11.6 (L) 01/26/2023    HIV Non-Reactive 10/14/2011       GBS Status:   Negative 1/18/23    Lab Results   Component Value Date    PAP NIL 10/17/2018       Labs:   Results for orders placed or performed during the hospital encounter of 01/26/23 (from the past 24 hour(s))   Fern Test for Rupture of Membranes   Result Value Ref Range    Fern Crystallization No ferning present No ferning present   Rupture of Fetal Membranes by ROM Plus   Result Value Ref Range    Rupture of Fetal Membranes by ROM Plus Negative Negative, Invalid, Suggest Repeat    Narrative    It is recommended that the tests to detect rupture of the amniotic membranes should not be used without other clinical assessments to make clinical patient management decision.   Wet preparation    Specimen: Vagina; Swab   Result Value Ref Range    Trichomonas Absent Absent    Yeast Absent Absent    Clue Cells Present (A) Absent    WBCs/high power field 3+ (A) None   Glucose by meter   Result Value Ref Range    GLUCOSE BY METER POCT 109 (H) 70 - 99 mg/dL   Fern Test for Rupture of Membranes   Result Value Ref Range    Fern Crystallization No ferning present No ferning present   CBC with platelets differential    Narrative    The following orders were created for panel order CBC with platelets differential.  Procedure                                Abnormality         Status                     ---------                               -----------         ------                     CBC with platelets and d...[[id number]]  Abnormal            Final result                 Please view results for these tests on the individual orders.   ABO/Rh type and screen    Narrative    The following orders were created for panel order ABO/Rh type and screen.  Procedure                               Abnormality         Status                     ---------                               -----------         ------                     Adult Type and Screen[[id number]]                            In process                   Please view results for these tests on the individual orders.   CBC with platelets and differential   Result Value Ref Range    WBC Count 9.8 4.0 - 11.0 10e3/uL    RBC Count 4.21 3.80 - 5.20 10e6/uL    Hemoglobin 11.6 (L) 11.7 - 15.7 g/dL    Hematocrit 35.4 35.0 - 47.0 %    MCV 84 78 - 100 fL    MCH 27.6 26.5 - 33.0 pg    MCHC 32.8 31.5 - 36.5 g/dL    RDW 13.0 10.0 - 15.0 %    Platelet Count 299 150 - 450 10e3/uL    % Neutrophils 77 %    % Lymphocytes 17 %    % Monocytes 5 %    % Eosinophils 0 %    % Basophils 0 %    % Immature Granulocytes 1 %    NRBCs per 100 WBC 0 <1 /100    Absolute Neutrophils 7.5 1.6 - 8.3 10e3/uL    Absolute Lymphocytes 1.7 0.8 - 5.3 10e3/uL    Absolute Monocytes 0.5 0.0 - 1.3 10e3/uL    Absolute Eosinophils 0.0 0.0 - 0.7 10e3/uL    Absolute Basophils 0.0 0.0 - 0.2 10e3/uL    Absolute Immature Granulocytes 0.1 <=0.4 10e3/uL    Absolute NRBCs 0.0 10e3/uL       A/P:   27 year old  at 24w5d by 6w0d US who presents from clinic for leakage of fluid. ROM+ and ferning test negative. However, given pooling present on exam, patient's clinical history, and significant reduction in fluid on most recent ultrasound, there is a high clinical suspicion for PPROM. Therefore, would recommend treating as though she is ruptured. Discussed findings  with patient who is amenable to admission. Will admit to antepartum.    #Presumed PPROM  - Admit to antepartum  - Latency antibiotics  - BMZ ordered  - NNP consult  - Patient just finished course of flagyl for BV, wet prep positive for clue cells again today. Will consider retreatment  - GC/CT negative last week  - GBS negative last week  - Will monitor closely for signs/symptoms of labor and intra-amniotic infection    #Type 2 diabetes  - PTA levemir 16U ordered  - QID BG, mSSI  - Endocrine consult for management given BMZ administration    #Fetal Well Being  - FHT appropriate for gestational age  - TID monitoring    #PNC:     - Rh positive, Rubella immune  - Hep B Antigen neg  - GBS neg  - Other infectious labs neg  - Placenta: anterior    Patient seen and care plan discussed under supervision of Dr. Berry.    Erin William MD  OB/GYN Resident, PGY-2    Physician Attestation   I saw this patient with the resident and agree with the resident/fellow's findings and plan of care as documented in the note.      Key findings: 27 year old  at 24w5d with oligo presents from [facility] to be rechecked for possible PPROM. Was seen last week and MVP was low, neg ROM +, etc.  Since then she reports continued wetness occurring. Will leak prior to passing urine. ROM + and fern negative today, so I did my own speculum exam and has clear pooling.     Discussed 99% sure has PPROM. She had PPROM with last pregnancy as well and not too surprised since seems the same. Discussed latency ABX, betamethasone, NICU consult and hopefully can stay inpatient and pregnant until 34 weeks which is what she did last time. Questions answered.     PAWEL BERRY MD  Date of Service (when I saw the patient): 23

## 2023-01-26 NOTE — PROGRESS NOTES
Gestational Diabetes Follow-up    Subjective/Objective:    Dario Nathan had appt today at 11 but forgot she also had US so we rescheduled for this afternoon. When I called her she reported being admitted to L&D d/t abnormality with US. She did not have her BG numbers with her and since she was admitted we did not do a full visit. She did report not yet starting the Humalog with dinner and is stilling using Levemir (once this runs out will start NPH as her insurance will not cover refills of Levemir). Last date of communication was: 1/20    Gestational diabetes is being managed with diet, activity and medications    Taking diabetes medications:   yes:     Diabetes Medication(s)     Insulin       insulin lispro (HUMALOG KWIKPEN) 100 UNIT/ML (1 unit dial) KWIKPEN    Inject 5 Units Subcutaneous daily (with dinner)     insulin NPH (HUMULIN N KWIKPEN) 100 UNIT/ML injection    Inject 14 Units Subcutaneous At Bedtime          Estimated Date of Delivery: May 13, 2023    Plan/Response:  Encouraged her to send in BG numbers via [employer] once she is discharged and able.   She has endocrinology appointment on 1/31.   We will reach out to schedule f/u diabetes Ed appointment for after her endo appt.     Irena Espinal RD, HENRRY, [facility]        Any diabetes medication dose changes were made via the CDE Protocol and Collaborative Practice Agreement with the patient's OB/GYN provider. A copy of this encounter was shared with the provider.

## 2023-01-26 NOTE — NURSING NOTE
Patient reports good fetal movement,denies contractions or bleeding. Patient continues to report about one episode per day of leaking fluid when laying down, also states she feels a trickle prior to urinating, unsure if pad is wet with fluid in between urinating or just vaginal discharge. Dario states she completed her course of Flagyl for BV diagnosed last week and does not note any change in vaginal discharge or leaking. She was evaluated in L/D last week for the same complaints.  SBAR given to MFM MD, see their note in Epic. Plan per MD to send patient to L/D at [facility] for rule out rupture evaluation. Charge SARAH Gifford notified with report. Patient is agreeable to plan, followup MFM appointments were made if patient is discharged.    Dr. Berry updated at [id number] of above.

## 2023-01-27 LAB
GLUCOSE BLDC GLUCOMTR-MCNC: 112 MG/DL (ref 70–99)
GLUCOSE BLDC GLUCOMTR-MCNC: 113 MG/DL (ref 70–99)
GLUCOSE BLDC GLUCOMTR-MCNC: 121 MG/DL (ref 70–99)
GLUCOSE BLDC GLUCOMTR-MCNC: 148 MG/DL (ref 70–99)
GLUCOSE BLDC GLUCOMTR-MCNC: 154 MG/DL (ref 70–99)
GLUCOSE BLDC GLUCOMTR-MCNC: 164 MG/DL (ref 70–99)
GLUCOSE BLDC GLUCOMTR-MCNC: 172 MG/DL (ref 70–99)
GLUCOSE BLDC GLUCOMTR-MCNC: 181 MG/DL (ref 70–99)

## 2023-01-27 PROCEDURE — 99223 1ST HOSP IP/OBS HIGH 75: CPT | Performed by: NURSE PRACTITIONER

## 2023-01-27 PROCEDURE — 250N000013 HC RX MED GY IP 250 OP 250 PS 637: Performed by: STUDENT IN AN ORGANIZED HEALTH CARE EDUCATION/TRAINING PROGRAM

## 2023-01-27 PROCEDURE — 59025 FETAL NON-STRESS TEST: CPT | Mod: 26 | Performed by: OBSTETRICS & GYNECOLOGY

## 2023-01-27 PROCEDURE — 258N000003 HC RX IP 258 OP 636: Performed by: NURSE PRACTITIONER

## 2023-01-27 PROCEDURE — 99231 SBSQ HOSP IP/OBS SF/LOW 25: CPT | Performed by: REGISTERED NURSE

## 2023-01-27 PROCEDURE — 250N000011 HC RX IP 250 OP 636: Performed by: STUDENT IN AN ORGANIZED HEALTH CARE EDUCATION/TRAINING PROGRAM

## 2023-01-27 PROCEDURE — 250N000009 HC RX 250: Performed by: NURSE PRACTITIONER

## 2023-01-27 PROCEDURE — 120N000002 HC R&B MED SURG/OB UMMC

## 2023-01-27 PROCEDURE — 99232 SBSQ HOSP IP/OBS MODERATE 35: CPT | Mod: 25 | Performed by: OBSTETRICS & GYNECOLOGY

## 2023-01-27 RX ORDER — SODIUM CHLORIDE 9 MG/ML
INJECTION, SOLUTION INTRAVENOUS CONTINUOUS
Status: DISCONTINUED | OUTPATIENT
Start: 2023-01-27 | End: 2023-02-14

## 2023-01-27 RX ORDER — PRENATAL VIT/IRON FUM/FOLIC AC 27MG-0.8MG
1 TABLET ORAL DAILY
Status: DISCONTINUED | OUTPATIENT
Start: 2023-01-28 | End: 2023-02-14 | Stop reason: HOSPADM

## 2023-01-27 RX ADMIN — ACYCLOVIR 400 MG: 400 TABLET ORAL at 14:07

## 2023-01-27 RX ADMIN — SENNOSIDES AND DOCUSATE SODIUM 1 TABLET: 8.6; 5 TABLET ORAL at 10:22

## 2023-01-27 RX ADMIN — ACYCLOVIR 400 MG: 400 TABLET ORAL at 19:53

## 2023-01-27 RX ADMIN — SODIUM CHLORIDE: 9 INJECTION, SOLUTION INTRAVENOUS at 22:37

## 2023-01-27 RX ADMIN — AMPICILLIN SODIUM 2 G: 2 INJECTION, POWDER, FOR SOLUTION INTRAMUSCULAR; INTRAVENOUS at 05:58

## 2023-01-27 RX ADMIN — AMPICILLIN SODIUM 2 G: 2 INJECTION, POWDER, FOR SOLUTION INTRAMUSCULAR; INTRAVENOUS at 00:10

## 2023-01-27 RX ADMIN — ACYCLOVIR 400 MG: 400 TABLET ORAL at 10:33

## 2023-01-27 RX ADMIN — BETAMETHASONE SODIUM PHOSPHATE AND BETAMETHASONE ACETATE 12 MG: 3; 3 INJECTION, SUSPENSION INTRA-ARTICULAR; INTRALESIONAL; INTRAMUSCULAR at 18:27

## 2023-01-27 RX ADMIN — AMPICILLIN SODIUM 2 G: 2 INJECTION, POWDER, FOR SOLUTION INTRAMUSCULAR; INTRAVENOUS at 23:56

## 2023-01-27 RX ADMIN — HUMAN INSULIN 1.5 UNITS/HR: 100 INJECTION, SOLUTION SUBCUTANEOUS at 22:40

## 2023-01-27 RX ADMIN — SENNOSIDES AND DOCUSATE SODIUM 1 TABLET: 8.6; 5 TABLET ORAL at 19:53

## 2023-01-27 RX ADMIN — AMPICILLIN SODIUM 2 G: 2 INJECTION, POWDER, FOR SOLUTION INTRAMUSCULAR; INTRAVENOUS at 11:52

## 2023-01-27 RX ADMIN — AMPICILLIN SODIUM 2 G: 2 INJECTION, POWDER, FOR SOLUTION INTRAMUSCULAR; INTRAVENOUS at 17:42

## 2023-01-27 ASSESSMENT — ACTIVITIES OF DAILY LIVING (ADL)
ADLS_ACUITY_SCORE: 20

## 2023-01-27 NOTE — DISCHARGE SUMMARY
[facility] Discharge Summary    Dario Nathan MRN# [id number]   Age: 27 year old YOB: 1995     Date of Admission:  2023  Date of Discharge:  23   Admitting Physician:  Dionna Berry MD  Discharge Physician:  Cynthia Whelan MD     Admission Diagnosis:  - IUP at 24w5d  - LOF, likely PPROM  - Hx PPROM  - Type 2 diabetes  - Elevated AFP  - Chorionic separation  - Hx HSV  - Hx COVID in pregnancy    Discharge Diagnosis:  - Postpartum s/p delivery of viable male infant,  at 27w3d  - Placental abruption  - Blood loss anemia    Procedures:   - STAT primary low transverse  section with single layer uterine closure via Pfannenstiel skin incision  - GETA   - TAP Block    Consultations:   - Anasthesia  - NICU  - Endocrinology  - Social Work    Medications prior to admission:  Medications Prior to Admission   Medication Sig Dispense Refill Last Dose     aspirin (ASA) 81 MG EC tablet Take 1 tablet (81 mg) by mouth daily for 180 days 90 tablet 1 2023     insulin NPH (HUMULIN N KWIKPEN) 100 UNIT/ML injection Inject 14 Units Subcutaneous At Bedtime 5 mL 4 2023     Prenatal Vit-DSS-Fe Cbn-FA (PRENATAL AD PO)    2023     acetone urine (KETOSTIX) test strip Check urine ketones when you wake up every morning for 7 days. If negative everyday, reduce testing to once a week. 50 strip 1      blood glucose (NO BRAND SPECIFIED) lancets standard Use to test blood sugar 4 times daily or as directed. 600 lancet 3      blood glucose (NO BRAND SPECIFIED) test strip Use to test blood sugar 4 times daily or as directed. Fasting and 1 hour postprandial 600 strip 3      blood glucose monitoring (NO BRAND SPECIFIED) meter device kit Use to test blood sugar 4 times daily or as directed. 1 kit 0      insulin lispro (HUMALOG KWIKPEN) 100 UNIT/ML (1 unit dial) KWIKPEN Inject 5 Units Subcutaneous daily (with dinner) 5 mL 3      insulin pen needle (32G X 4 MM) 32G X  4 MM miscellaneous Use 1 pen needles daily or as directed. 60 each 3      Insulin Pen Needle (PEN NEEDLES) 32G X 4 MM MISC 1 each 2 times daily Use for NPH and Novolog insulin 100 each 4      [] metroNIDAZOLE (FLAGYL) 500 MG tablet Take 1 tablet (500 mg) by mouth 2 times daily for 7 days 14 tablet 0        Brief History of Presentation:    Dario Nathan is a 27 year old  at 24w5d by 6w0d US who presents from [facility] clinic for leakage of fluid. Pregnancy complicated as below.     Patient reports she has had intermittent leakage of fluid over the past 3 weeks. She reports it happens most frequently when she moves while laying down, and will feel a small gush. Never enough to soak through her underwear. It was happening about once per day, but has now increased in frequency to about 3 times per day. The fluid is clear without odor. Patient was seen last week for leakage of fluid as well, and at that time her work-up was negative for rupture of membranes. She was found to have BV and just finished her course of flagyl. She had a repeat US today which showed very minimal fluid, and therefore was sent here from clinic.   Patient states she is otherwise feeling well today.  Denies VB. Good FM. No contractions. No fevers or chills.    ROM+ and ferning test negative. However, given pooling present on exam, patient's clinical history, and significant reduction in fluid on most recent ultrasound, there is a high clinical suspicion for PPROM. Therefore, would recommend treating as though she is ruptured. Discussed findings with patient who is amenable to admission. Will admit to antepartum.    Antepartum Course:    She was admitted to antepartum for inpatient management of PPROM. Received BMZ from -. She was started on a 7d course of latency antibiotics (wet prep +clue cells). For her type 2 diabetes, she was manage on Lantus (9 units daily AM) and Novolog mSSI per Endocrinology. Fetus was determined to be breech  presentation on .  AM, patient noticed increased bleeding and mild contractions and FHT demonstrated sudden recurrent deep variable decels with fernanda to 50s at 1320. OB STAT CS was called.    Intrapartum Course:   The procedure was uncomplicated.   mL.  See operative report for details.     Findings:   - Viable male infant delivered at 1335 on 2023 with APGARs pending, weight pending.   - Cord gases: arterial pH 7.24, base excess -4.1, venous pH 7.27, base excess -3.6  - Scant bloody amniotic fluid and moderate clot immediately upon hysterotomy, consistent with placental abruption. Otherwise normal appearing placenta, removed intact.   - Normal uterus, tubes, and ovaries.   - No adhesions.   - Surgical sites noted to be hemostatic at the end of case.     Postpartum Course:   The patient's hospital course was unremarkable.  She recovered as anticipated and experienced no post-operative complications. Endocrinology recommended no medications on discharge for her type 2 diabetes. On discharge, her pain was well controlled. Vaginal bleeding is similar to peak menstrual flow.  Voiding without difficulty.  Ambulating well and tolerating a normal diet.  No fever or significant wound drainage.  Pumping.  Infant is stable in NICU. She was discharged on post-partum day #3.    Post-partum hemoglobin: 10    Discharge Medications:     Review of your medicines      START taking      Dose / Directions   acetaminophen 325 MG tablet  Commonly known as: TYLENOL  Used for: S/P  section      Dose: 650 mg  Take 2 tablets (650 mg) by mouth every 6 hours as needed for mild pain Start after Delivery.  Quantity: 100 tablet  Refills: 0     ibuprofen 600 MG tablet  Commonly known as: ADVIL/MOTRIN  Used for: S/P  section      Dose: 600 mg  Take 1 tablet (600 mg) by mouth every 6 hours as needed for moderate pain (4-6) Start after delivery  Quantity: 60 tablet  Refills: 0     oxyCODONE 5 MG  tablet  Commonly known as: ROXICODONE  Used for: S/P  section      Dose: 5 mg  Take 1 tablet (5 mg) by mouth every 4 hours as needed for moderate to severe pain  Quantity: 4 tablet  Refills: 0     senna-docusate 8.6-50 MG tablet  Commonly known as: SENOKOT-S/PERICOLACE  Used for: S/P  section      Dose: 1 tablet  Take 1 tablet by mouth daily Start after delivery.  Quantity: 100 tablet  Refills: 0        CONTINUE these medicines which have NOT CHANGED      Dose / Directions   acetone urine test strip  Commonly known as: KETOSTIX  Used for: Pre-existing type 2 diabetes mellitus during pregnancy in first trimester      Check urine ketones when you wake up every morning for 7 days. If negative everyday, reduce testing to once a week.  Quantity: 50 strip  Refills: 1     blood glucose lancets standard  Commonly known as: NO BRAND SPECIFIED  Used for: Pre-existing type 2 diabetes mellitus during pregnancy in first trimester      Use to test blood sugar 4 times daily or as directed.  Quantity: 600 lancet  Refills: 3     blood glucose monitoring meter device kit  Commonly known as: NO BRAND SPECIFIED  Used for: Pre-existing type 2 diabetes mellitus during pregnancy in first trimester      Use to test blood sugar 4 times daily or as directed.  Quantity: 1 kit  Refills: 0     blood glucose test strip  Commonly known as: NO BRAND SPECIFIED  Used for: Pre-existing type 2 diabetes mellitus during pregnancy in first trimester      Use to test blood sugar 4 times daily or as directed. Fasting and 1 hour postprandial  Quantity: 600 strip  Refills: 3     * insulin pen needle 32G X 4 MM miscellaneous  Commonly known as: 32G X 4 MM  Used for: Pre-existing type 2 diabetes mellitus during pregnancy in first trimester      Use 1 pen needles daily or as directed.  Quantity: 60 each  Refills: 3     * Pen Needles 32G X 4 MM Misc  Used for: Pre-existing type 2 diabetes mellitus during pregnancy in first trimester      Dose: 1  each  1 each 2 times daily Use for NPH and Novolog insulin  Quantity: 100 each  Refills: 4     PRENATAL AD PO      Refills: 0         * This list has 2 medication(s) that are the same as other medications prescribed for you. Read the directions carefully, and ask your doctor or other care provider to review them with you.            STOP taking    aspirin 81 MG EC tablet  Commonly known as: ASA        HumuLIN N KWIKPEN 100 UNIT/ML injection  Generic drug: insulin NPH        insulin lispro 100 UNIT/ML (1 unit dial) KWIKPEN  Commonly known as: HumaLOG KWIKpen        metroNIDAZOLE 500 MG tablet  Commonly known as: FLAGYL              Where to get your medicines      These medications were sent to [facility] [employer], [place] - [address]  [address] [address]    Phone: [phone]     acetaminophen 325 MG tablet    ibuprofen 600 MG tablet    oxyCODONE 5 MG tablet    senna-docusate 8.6-50 MG tablet         Discharge Instructions:    Discharge diet: Regular   Discharge activity: No lifting greater than 20 lbs, pushing, pulling, or other strenuous activity for 6 weeks. Pelvic rest for 6 weeks including no sexual intercourse, tampons, or douching. No driving until you can slam on the brakes without pain or while on narcotic pain medications.    Discharge follow-up: Follow up with primary OB in 2 wks for mood and incision check and in 6 wks for routine postpartum visit.   Follow up with endocrinology in 3-6 weeks   Wound care: Keep incision clean and dry     Doris Sams MD  ObGyn, PGY-3  02/17/2023 10:21 AM       Physician Attestation   I, Cynthia Whelan, have seen and examined this patient.  I have reviewed the above note and agree with discharge plan as documented in the above note. Discussed postpartum instructions/restrictions and follow up.       Cynthia Whelan MD  Date of Service (when I saw the patient): 02/17/23

## 2023-01-27 NOTE — PROGRESS NOTES
Antepartum Progress Note    S: Patient feeling well, no complaints or concerns. Continues to have small gushes of clear fluid. Denies vaginal bleeding, contractions. Noting good FM.    O:   BP 90/54   Pulse 81   Temp 98.1  F (36.7  C) (Oral)   Resp 18   LMP 2022   Gen: Appears well, NAD  Abd: Gravid, nontender  Ext: Warm, well perfused, no edema bilaterally     midday monitoring  FHT: 150 baseline, moderate variability, present 10x10 accels, single, subtle prolong decel, returned to normal baseline and reassuring for 30 min following  TOCO: quiet    Labs:   Lab Results   Component Value Date/Time     (H) 2023 02:45 PM     (H) 2023 12:54 PM     (H) 2023 10:16 AM     (H) 2023 02:10 AM     (H) 2023 10:14 PM     (H) 2023 09:13 PM    GLC 87 2023 06:44 PM     (H) 2023 02:50 PM    GLC 82 2023 01:31 PM       A/P: Dario Nathan is a 27 year old  female at 24w6d by 6w4d US, HD#2 admitted for PPROM. Pregnancy notable for T2DM and hx HSV.    # PPROM  - Presumed diagnosis based on positive pooling and new oligohydramnios, however workup somewhat equivocal with negative ROM plus and negative ferning.   - Wet prep +clue cells, s/p flagyl, GC/CT neg, UA neg  - MFM US () EFW 41%, repeat  oligo. Plan for repeat fluid check weekly  - s/p BMZ #1 (1820 on ), repeat dose this evening  - D#2/7 latency abx  - NICU consulted  - s/p CCS consent    # Type 2 DM  - PTA levemir 16u qHS (ord)  - AC/HS, MSSI  - Endocrine consulted    # HSV  - Acyclovir ppx ord'd    # PNC  - Rh pos, Rubella immune, GBS neg  - Tdap due, ord'd    # FWB  Appropriate for gestational age   - TID monitoring   -  surveillance beginning 28 weeks  - BMZ -  - Magnesium if moving toward deliv <32 weeks  - NICU for delivery      Delivery Plan: expectant management to 34 weeks    Patient seen and care plan discussed with   [place]op[place].     Theresa Sylvester MD  ObGyn Resident, PGY-3  01/27/2023       Physician Attestation   I, Cynthia Whelan, personally saw and evaluated Dario along with the resident.  I have reviewed and discussed with the patient the plan of care as noted above.   My key history and physical exam findings from today. Patient is stable, no symptoms or signs of infection, abruption.  She was treated last week for BV and completed the treatment but wet prep on admission still showed clue cells so will continue with metronidazole.   Fetal status is overall reassuring with majority of the tracing WNL for current gest age.  NST is reassuring.   Key management decisions made by me: add in metronidazole. No other changes.  Continue with expectant management until 34 wks.        Cynthia Whelan MD  Date of Service (when I saw the patient): January 27, 2023

## 2023-01-27 NOTE — PROVIDER NOTIFICATION
01/27/23 1546   Provider Notification   Provider Name/Title Dr Sylvester   Method of Notification In Department   Request Evaluate - Remote   Notification Reason Status Update     Reviewed FHT with Dr Sylvester. Dr Sylvester okayed for patient to come off monitoring.

## 2023-01-27 NOTE — PROGRESS NOTES
"Please see \"Imaging\" tab under \"Chart Review\" for details of today's ultrasound.    Sly Rand M.D.  Specialist in Maternal-Fetal Medicine     "

## 2023-01-27 NOTE — PROVIDER NOTIFICATION
01/27/23 1517   Provider Notification   Provider Name/Title Dr Sylvester   Method of Notification In Department   Request Evaluate - Remote   Notification Reason Decels;Variability Change     Reviewed FHT with Dr Sylvester. FHT appeared to have a prolonged decel from 150bpm to 135 bpm. Patient repositioned on side and resolved. Also notified provider of initial variability having periods of minimal. Plan per Dr Sylvester is to observe for 30 mins longer and patient can come off monitor if appropriate FHT and no more decels present.

## 2023-01-27 NOTE — CONSULTS
New In-Patient Diabetes/Hyperglycemia Management Consult  Dario Nathan  Age: 27 year old  MRN # [id number]   YOB: 1995    Chief Complaint: pregnancy with presumed ROM+  Reason for consult: pregestational diabetes, now 24w5d getting betamethasone  Consult requested by: Theresa Sylvester MD    All information gathered via chart review and face-to-face interview and assessment  Additional historian: no  Unique sources of records reviewed :  Clinic notes/previous inpatient IDS notes/hospitalizations    Professional  utilized --> No    History of Present Illness:  Dario Nathan is a 27 year old  at 24w5d by 6w0d US who presents from [facility] clinic for leakage of fluid. Admitted 2023.      IDS consulted for assistance with BG management as above identified.    Dario Nathan is not known to the IDS.  She has been referred to see Endocrinology by Dr. Pittman but has not yet established care.    Dario carries a dx of prediabetes - first A1c on record from 10 year ago (3/2012) of 5.8%.   However there is a diagnostic A1c for T2DM of 6.4% 2022 and 6.7% 10/11/2022.    No recent OGTT testing found for this pregnancy, previous testing found from  which was elevated.   1 hr 50 g OGTT was elevated at 221 mg/dL in . She did not take medications or insulin during her previous pregnancies.     Records indicate she was started on and has been taking Levemir - she reports she started taking this back in 2022 at 12 unit(s) and more recently it was increased to 14 unit(s) which she takes at night, usually midnight.  She does not take novolog meal insulin or sliding scale insulin.   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  Feels her BGs have been in relatively  "good control.     Shares NPH has also been ordered however she was waiting until she used up her Levemir before switching to the NPH.    She has been seeing ROWENA Narayan RD, HENRRY, Formerly named [facility]  Last virtual visit 1/20/2023 - was to see her again 1/26 but had her US and ultimately was admitted.     Per her last visit:     \"BG/Food Log:   *checks 2 hours after  Date Ketones Fasting Post Breakfast Post Lunch Post Supper   1/14   73 94 110 149   1/15   82 102   119   1/16   94 85 138     1/17   103 (1012) 98 111 144   1/18   105 (1015AM) 128 115 132   1/19   92 (@1:30pm) 132 110     1/20 possibly trace 142 (905am)            Breakfast: eggs OR quesadilla OR piece bread  1/19- breakfast:   1:19-Dinner-orange +1.5 mini burritos + granola bar + pork skin (ate at midnight)\".       Inpatient regimen at time of consult:  Levemir 16 unit(s), Novolog 1:15 g cho and medium resistance sliding scale insulin TID AC/HS.   BMZ has been started.     BG at time of consult: 193 - 121    Unique/Relevant Labs:   Na  - no recent labs  K -no recent labs   Bicarb  - no recent labs  Anion Gap no recent labs  Creat no recent labs/GFR no recent labs  Hgb 11.6  Covid - neg    BG trends:        Today Dario is seen face-to-face at bedside.  She is doing well and has no focal complaints.   We review the plan for insulin while in-patient and through the betamethasone window and she verbalizes understanding.     All questions and concerns addressed.       Orders Placed This Encounter      Regular Diet Adult    D5W-containing solutions/medications/confounders: Betamethasone 12 mg 1/26 at 1820 - expect the second dose today around the same time.     Planned Procedures/surgeries: none    ELOS:  TBD - anticipated here through 34 weeks gestation.     Diabetes:  Recent Labs   Lab 01/27/23  0210 01/26/23  2214 01/26/23  2113 01/26/23  1844 01/26/23  1450   * 168* 193* 87 109*       Diabetes Type:  Type 2 Diabetes  JOANN-65 aicha, c-peptide, islet cell aicha - none " found in Epic review     Diabetes Duration: Dario carries a dx of prediabetes - first A1c on record from 10 year ago (3/2012) of 5.8%.  However there is a diagnostic A1c for T2DM of 6.4% 5/18/2022 and 6.7% 10/11/2022.    Diabetes Control:   Lab Results   Component Value Date    A1C 6.7 10/11/2022    A1C 6.4 05/18/2022    A1C 5.8 03/19/2012       Usual (PTA) Diabetes Regimen:     Medications:   Levemir 16 unit(s) once daily     BG monitoring frequency:  2-4 times per day   BG trends at home: 70's - 150's     Historical:   No hx of medications    Diet: no restrictions    Bf: wakes up later in day - 10 am - will eat eggs - saus/turkey   Lunch/dinner: chipotle/brown rice/protein/tomatoes/avocado toast/fajitas/steak/pierson salad/sandwich w 1/2 bun, chicken tenders/fries - tries to keep it to 1/2 - 1/3 portions.   Snacks: string cheese/fruit/veggies   Beverages: black coffee/water - once in a while - regular coke.      Issues with food insecurity:  no  Recent weight changes:  no    Exercise: active with work and .     Ability to [place] Prescribed Regimen: TBD, Barriers exist - has not done cho counting or sliding scale insulin at home previously     Diabetes Complications:  retinopathy  denies, last dilated eye exam at least 3 years ago   Peripheral neuropathy:  Denies   Nephropathy:  no, recent microalbuminuria no  Known autonomic neuropathy: no, specifically gastroparesis:  no, gastric-emptying test no  Dizziness/fainting when standing: no  Urinary difficulties: no      History of DKA:   no  Able to Detect Hypoglycemia: has not really happened   Safety Kit:  no  Medic Alert:  no    Usual Diabetes Care Provider/CDCES:  PCP No Ref-Primary, Physician    ROWENA Narayan RD, HENRRY, HENRRY Adamson RD, [facility]    Factors impacting IP BG control currently:  BMZ, pregnancy, stress.     Review of Systems:    CC:  denies all   Constitutional:   No fever, no chills  ENT/Mouth:   Hearing at level of conversation, denies  hearing changes, no ear pain, no sore throat, no rhinorrhea, no difficulty swallowing  Eyes:  No eye pain, no discharge, no vision changes  CV:   No CP/SOB, no new edema  Resp:  No cough, no wheezing  GI:   Denies nausea, no vomiting, no constipation, no diarrhea  :  No dysuria, no frequency, no hematuria  Musk:  No joint swelling/pain, No back pain  Skin/heme:   No new rashes/bruises/open areas.  No pruritis  Neuro:   No new weakness, denies numbness/tingling, no headache  Psych:   No new anxiety, denies changes/worsening mood concerns  Endocrine:  No polyuria, no polydipsia      Past medical, family and social histories are reviewed and updated.    Past Medical History  Past Medical History:   Diagnosis Date     Diabetes mellitus, type 2 (H) 11/10/2022     Genital herpes      Varicella        Family History  Family History   Problem Relation Age of Onset     Diabetes Mother      Diabetes Maternal Grandmother      Cancer Maternal Uncle         brain or spinal cord cancer?     Confirms mom has a dx of DM   As does maternal aunt/uncle and paternal grandmother.     Social History  Social History     Socioeconomic History     Marital status:      Spouse name: None     Number of children: None     Years of education: None     Highest education level: None   Tobacco Use     Smoking status: Never     Smokeless tobacco: Never   Vaping Use     Vaping Use: Never used   Substance and Sexual Activity     Alcohol use: No     Drug use: No     Sexual activity: Yes     Partners: Male     Tobacco: no  Etoh: no  Drugs: no  Marital Status:   Lives with spouse in house in [place]      Physical Exam:  /51   Pulse 82   Temp 98.4  F (36.9  C) (Oral)   Resp 18   LMP 07/16/2022     General:  Pleasant, no acute distress.  HEENT: NC/AT, PER and anicteric, non-injected, oral mucous membranes moist.   Lungs: non-labored, no cough, no SOB/wheezing  ABD: rounded/soft  Skin: warm and dry, no obvious lesions  Feet: CMS  intact   MSK: fluid movement of extremities   Lymp: no LE edema.   Mental status: Alert, oriented x3, communicating clearly, memory intact.  Psych: calm, appropriate mood, congruent affect.    Laboratory  Recent Labs   Lab Test 01/27/23  0210 01/26/23  2214 01/18/23  1331 12/05/22  1101   * 168*   < >  --    CR  --   --   --  0.50*    < > = values in this interval not displayed.     CBC RESULTS:   Recent Labs   Lab Test 01/26/23  1749   WBC 9.8   RBC 4.21   HGB 11.6*   HCT 35.4   MCV 84   MCH 27.6   MCHC 32.8   RDW 13.0          Liver Function Studies -   Recent Labs   Lab Test 12/05/22  1101   AST 14   ALT <5*       Active Medications  Current Facility-Administered Medications   Medication     acetaminophen (TYLENOL) tablet 650 mg     acyclovir (ZOVIRAX) tablet 400 mg     alum & mag hydroxide-simethicone (MAALOX) suspension 30 mL     [START ON 1/28/2023] amoxicillin (AMOXIL) capsule 250 mg     ampicillin (OMNIPEN) 2 g vial to attach to  mL bag     betamethasone acet & sod phos (CELESTONE) injection 12 mg     glucose gel 15-30 g    Or     dextrose 50 % injection 25-50 mL    Or     glucagon injection 1 mg     diphenhydrAMINE (BENADRYL) capsule 25 mg    Or     diphenhydrAMINE (BENADRYL) injection 25 mg     hydrOXYzine (ATARAX) tablet 50 mg     insulin aspart (NovoLOG) injection (RAPID ACTING)     insulin aspart (NovoLOG) injection (RAPID ACTING)     insulin aspart (NovoLOG) injection (RAPID ACTING)     insulin detemir (LEVEMIR PEN) injection 16 Units     lidocaine (LMX4) cream     lidocaine 1 % 0.1-1 mL     metoclopramide (REGLAN) injection 10 mg    Or     metoclopramide (REGLAN) tablet 10 mg     ondansetron (ZOFRAN ODT) ODT tab 4 mg    Or     ondansetron (ZOFRAN) injection 4 mg     prenatal multivitamin w/iron per tablet 1 tablet     prochlorperazine (COMPAZINE) injection 10 mg    Or     prochlorperazine (COMPAZINE) tablet 10 mg    Or     prochlorperazine (COMPAZINE) suppository 25 mg      senna-docusate (SENOKOT-S/PERICOLACE) 8.6-50 MG per tablet 1 tablet    Or     senna-docusate (SENOKOT-S/PERICOLACE) 8.6-50 MG per tablet 2 tablet     simethicone (MYLICON) chewable tablet 160 mg     sodium chloride (PF) 0.9% PF flush 3 mL     sodium chloride (PF) 0.9% PF flush 3 mL     Tdap (tetanus-diphtheria-acell pertussis) (ADACEL) injection 0.5 mL     No current outpatient medications on file.     Assessment:    1)  Type 2 Diabetes Mellitus; suboptimal control.  A1c 6.7%  2)  Acute hyperglycemia exacerbated by steroids/pregnancy/stress   3)  Anemia       Plan:      -  Determir 16 unit(s) at HS - trending for need to adjust. Addm:  Will increase to 18 unit(s) tonight    -  Novolog meal coverage 1 unit(s) per 11 g cho AC meals/snacks - increase to 1:9 starting at lunch and further increase to 1:7 this evening following trends.     -  Novolog custom sliding scale insulin 1/35 >100 TID AC and >120 HS and 0200    -  Add PRN IV OB high intensity IV insulin drip for BG > 150 x 2 consecutive reads    -  BG monitoring TID AC, 2 hr PP, HS and 0200    -  PTA meds:  N/A    -  Will add A1c - goal in preg is < 6.5% and last was > 3 months ago      -  Hypoglycemia protocol    -  Recommend carb counting protocol    -  Education :  TBD     -  Outpatient follow up:  recommend [facility] vs PCP    -  Recommend eye exam as soon as able - last exam at least 3 years ago      -  Please do not hesitate to contact the team with any questions/concerns.     -  Please notify inpatient diabetes service if changes are planned that will impact glycemia, such as NPO, starting/adjusting steroids, enteral feeding, parenteral feeding, dextrose fluids or procedures.     Management of Diabetes in pregnancy:    BG targets  Less than 95 fasting  Less than 140 one hour post-meal  Less than 120 two hours post-meal  Goal of 0 episodes of blood sugars less than 60       -  Thank you for this consult, IDS will follow    Malini Morrissey  MAGALI CNP   Inpatient Diabetes Management Service  Pager - [phone]      To contact Endocrine Diabetes service:   From 7 AM-5 PM: page inpatient diabetes provider who is following the patient that day (see filed or incomplete progress notes/consult notes).  If uncertain of provider assignment: page job code 3 *'s,  [phone] then enter [phone].  For questions or updates from 5PM-7AM: page the diabetes job code for on call fellow: [phone]    Please notify inpatient diabetes service if changes are planned to steroids, nutrition, or if procedures are planned requiring prolonged NPO status.Diabetes Management Team job code: [phone]      I spent a total of 110 minutes on the date of the encounter doing prep/post-work, chart review, history and exam, documentation and further activities per the note including lab review, multidisciplinary communication, counseling the patient and/or coordinating care regarding acute hyper/hypoglycemic management, pharmacy consultation and discharge planning/referrals.  See note for details.

## 2023-01-27 NOTE — CONSULTS
[facility]  MATERNAL CHILD HEALTH   INITIAL PSYCHOSOCIAL ASSESSMENT     DATA:     Presenting Information: Pt, Dario, is a 27 year old  at 24w6d admitted for leakage of fluid. SW was consulted for antepartum assessment.    Living Situation: Dario lives with her , Hipolito and their two sons, Anthony (10) and Wallace (7) in [place], [place]. Dario's brother, Ajay, also lives with them. Anthony and Wallace will be staying with Uncle Mitchell and his family this weekend. Given that Hipolito works overnights he will continue to take the boys to school and pick them up at the end of the day. Dario's brother, Ajay will help in the evenings.      Social Support: Dario describes having a large family support system. Her , Hipolito is very supportive. Other family support, locally includes: Montrell (brother and his wife), Nitish (brother and his wife), Brother-in-law, Mitchell, Mother-in-law, Monconchita Faith. Dario is also very close with her mother and father, who live in [facility]. They talk and text daily.     Education/Employment: Dario currently works evenings, part time at [employer] in [place]. She works as a Certified Medical Assistant (CMA). She has worked there a little over a year. She has access to PTO and did have access to FMLA when she worked a .7 FTE. She no longer has access to FMLA as she is a .5 FTE. They did tell her she has access to Short Term Disability though. Her , Hipolito is going to  the packet sometime this weekend. Hipolito works full time for the [employer]. He is a . He works overnight hours. He has access to PTO and FMLA.     Insurance: They currently have insurance through [employer]. The plan administered by Hipolito's employer. Dario plans to call the insurance to find out what kind of coverage she has for this type of hospitalization.     Source of  Financial Support: parental employment. No concerns noted, presently.     Mental Health History: No formal mental health diagnoses. Dario shared that she suffered from post partum depression after the birth of her first son, Anthony. She explained when Anthony was born, Hipolito was in the  and was often away during the week and on weekends for drill. They lived with his mother, Karen during that time. She felt this was stressful. She didn't feel she knew her mother-in-law that well, yet, during that time. Hipolito has since completed his time in the  and was discharged. Dario spent about 3-4 weeks on the Antepartum unit prior to the birth of her son, Wallace, back in 2015. She shared that she has experienced an increase in her anxiety since last year. She experienced a miscarriage in May of 2022. When she learned that she was pregnant, again late last summer/early fall, she shared feeling anxious as she knew she had a hx of PPROM and that there was a chance she would deliver early. She shared feeling some anxiety about being on bedrest. She explained that the goal, as of today, is to try and make it to 34 weeks. We talked about her previous antepartum experience and coping with prolonged hospital admission. SW provided education about [facility] and Antepartum Support Group on Wednesdays at 2 pm. She enjoys reading and watching movies. She shared her family will bring her some games and activities to do. She is aware her sons can visit her in Antepartum. They will NOT be able to visit baby in NICU.     History of Postpartum Mood Disorders: Endorsed having post partum depression following birth of first son. She described this as being largely related to her  being away at [employer] training after Anthony's birth. She is aware that having a complicated pregnancy, prolonged bedrest and baby in NICU can increase incidences of post partum mood disorders. She is aware of the  common signs and symptoms. She shared feeling well supported by family and friends. She acknowledged the biggest challenge will be keeping busy (though she is thinking of things and will let her family know what she would like).     Chemical Health History: None.     Legal/Child Protection Involvement: None.     INTERVENTION:       Chart review    Collaboration with team: Bedside RN.     Conducted Psychosocial Assessment    Introduction to Maternal Child Health SW role and scope of practice    Orientation to the NICU (parking, lodging, meals, visitation)    Validated emotions and provided supportive listening    Provided psychoeducation on  mood disorders and indicated that SW would continue to monitor mood and support bridging to mental health resources as needed.    Provided SW contact info: Yamileth Fontenot [facility], will be primary [facility] SW following.     ASSESSMENT:     Coping: Dario appears to be coping quite well. She verbalized understanding of the medical issues she and baby are currently facing. She is engaged with staff and asks appropriate questions. She acknowledges that she was able to cope well during her previous antepartum admission. She is feeling a bit worried as this one is likely to be quite a bit longer. Encouraged her to talk about her feelings/emotions, engage in activities during the day (as she is able), and participate in Antepartum Support Group.     Risk Factors: other children at home needing care and attention     Resiliency Factors & Strengths: local family, experienced parents, strong social support, parental employment, stable housing, reliable transportation and able and willing to ask for help/accept help    PLAN:     SW will continue to follow for supportive intervention.    CONNIE Ramos, [facility]  Clinical    Pediatric Hematology Oncology   [facility]   Monday-Thursday   Phone: [phone]  Pager: [phone]    NO  LETTER

## 2023-01-28 LAB
GLUCOSE BLDC GLUCOMTR-MCNC: 114 MG/DL (ref 70–99)
GLUCOSE BLDC GLUCOMTR-MCNC: 116 MG/DL (ref 70–99)
GLUCOSE BLDC GLUCOMTR-MCNC: 119 MG/DL (ref 70–99)
GLUCOSE BLDC GLUCOMTR-MCNC: 121 MG/DL (ref 70–99)
GLUCOSE BLDC GLUCOMTR-MCNC: 124 MG/DL (ref 70–99)
GLUCOSE BLDC GLUCOMTR-MCNC: 133 MG/DL (ref 70–99)
GLUCOSE BLDC GLUCOMTR-MCNC: 134 MG/DL (ref 70–99)
GLUCOSE BLDC GLUCOMTR-MCNC: 136 MG/DL (ref 70–99)
GLUCOSE BLDC GLUCOMTR-MCNC: 137 MG/DL (ref 70–99)
GLUCOSE BLDC GLUCOMTR-MCNC: 140 MG/DL (ref 70–99)
GLUCOSE BLDC GLUCOMTR-MCNC: 146 MG/DL (ref 70–99)
GLUCOSE BLDC GLUCOMTR-MCNC: 148 MG/DL (ref 70–99)
GLUCOSE BLDC GLUCOMTR-MCNC: 152 MG/DL (ref 70–99)
GLUCOSE BLDC GLUCOMTR-MCNC: 155 MG/DL (ref 70–99)
GLUCOSE BLDC GLUCOMTR-MCNC: 160 MG/DL (ref 70–99)
GLUCOSE BLDC GLUCOMTR-MCNC: 163 MG/DL (ref 70–99)
GLUCOSE BLDC GLUCOMTR-MCNC: 176 MG/DL (ref 70–99)
GLUCOSE BLDC GLUCOMTR-MCNC: 186 MG/DL (ref 70–99)
GLUCOSE BLDC GLUCOMTR-MCNC: 189 MG/DL (ref 70–99)
GLUCOSE BLDC GLUCOMTR-MCNC: 88 MG/DL (ref 70–99)
GLUCOSE BLDC GLUCOMTR-MCNC: 89 MG/DL (ref 70–99)
GLUCOSE BLDC GLUCOMTR-MCNC: 93 MG/DL (ref 70–99)
HBA1C MFR BLD: 4.9 % (ref 0–5.6)

## 2023-01-28 PROCEDURE — 250N000013 HC RX MED GY IP 250 OP 250 PS 637: Performed by: OBSTETRICS & GYNECOLOGY

## 2023-01-28 PROCEDURE — 250N000011 HC RX IP 250 OP 636: Performed by: STUDENT IN AN ORGANIZED HEALTH CARE EDUCATION/TRAINING PROGRAM

## 2023-01-28 PROCEDURE — 99231 SBSQ HOSP IP/OBS SF/LOW 25: CPT | Mod: GC | Performed by: OBSTETRICS & GYNECOLOGY

## 2023-01-28 PROCEDURE — 99233 SBSQ HOSP IP/OBS HIGH 50: CPT | Performed by: NURSE PRACTITIONER

## 2023-01-28 PROCEDURE — 36415 COLL VENOUS BLD VENIPUNCTURE: CPT | Performed by: NURSE PRACTITIONER

## 2023-01-28 PROCEDURE — 250N000009 HC RX 250: Performed by: NURSE PRACTITIONER

## 2023-01-28 PROCEDURE — 120N000002 HC R&B MED SURG/OB UMMC

## 2023-01-28 PROCEDURE — 83036 HEMOGLOBIN GLYCOSYLATED A1C: CPT | Performed by: NURSE PRACTITIONER

## 2023-01-28 PROCEDURE — 250N000013 HC RX MED GY IP 250 OP 250 PS 637: Performed by: STUDENT IN AN ORGANIZED HEALTH CARE EDUCATION/TRAINING PROGRAM

## 2023-01-28 RX ADMIN — ACYCLOVIR 400 MG: 400 TABLET ORAL at 13:53

## 2023-01-28 RX ADMIN — SENNOSIDES AND DOCUSATE SODIUM 1 TABLET: 8.6; 5 TABLET ORAL at 19:43

## 2023-01-28 RX ADMIN — ACYCLOVIR 400 MG: 400 TABLET ORAL at 08:21

## 2023-01-28 RX ADMIN — SENNOSIDES AND DOCUSATE SODIUM 1 TABLET: 8.6; 5 TABLET ORAL at 08:26

## 2023-01-28 RX ADMIN — PRENATAL VITAMINS-IRON FUMARATE 27 MG IRON-FOLIC ACID 0.8 MG TABLET 1 TABLET: at 19:43

## 2023-01-28 RX ADMIN — AMPICILLIN SODIUM 2 G: 2 INJECTION, POWDER, FOR SOLUTION INTRAMUSCULAR; INTRAVENOUS at 11:48

## 2023-01-28 RX ADMIN — AMPICILLIN SODIUM 2 G: 2 INJECTION, POWDER, FOR SOLUTION INTRAMUSCULAR; INTRAVENOUS at 05:50

## 2023-01-28 RX ADMIN — AMOXICILLIN 250 MG: 250 CAPSULE ORAL at 18:36

## 2023-01-28 RX ADMIN — HUMAN INSULIN 1.5 UNITS/HR: 100 INJECTION, SOLUTION SUBCUTANEOUS at 12:16

## 2023-01-28 RX ADMIN — ACYCLOVIR 400 MG: 400 TABLET ORAL at 19:43

## 2023-01-28 RX ADMIN — HUMAN INSULIN 2 UNITS/HR: 100 INJECTION, SOLUTION SUBCUTANEOUS at 01:48

## 2023-01-28 ASSESSMENT — ACTIVITIES OF DAILY LIVING (ADL)
ADLS_ACUITY_SCORE: 20

## 2023-01-28 NOTE — PROVIDER NOTIFICATION
"   01/27/23 2215   Provider Notification   Provider Name/Title Dr. Doris Sams/G3   Method of Notification Electronic Page   Notification Reason Status Update     \"Dario Nathan - starting insulin infusion per orders as last two BG were 163 and 154 - SARAH Lennon\"  "

## 2023-01-28 NOTE — PROVIDER NOTIFICATION
01/28/23 1548   Provider Notification   Provider Name/Title Dr Gold   Method of Notification Electronic Page   Request Evaluate - Remote   Notification Reason Fetal Baseline Change     Afternoon FHT had mostly minimal variability with a small period of moderate. Reviewed FHT in department, Dr Asif carrillo with FHT at this time.

## 2023-01-28 NOTE — CARE PLAN
Insulin infusion initiated per orders after 2 consecutive POC BG readings were >150. Insulin infusion set up, lines traced/labeled and pump settings verified x2 RNs. Education provided to patient. Requested pt notify RN for s/s of hypoglycemia, including fatigue, headache, shakiness, nausea, or difficulty concentrating. Educated pt that BG checks would be hourly while insulin is infusing. Pt verbalized agreement with plan of care, teach-back performed.

## 2023-01-28 NOTE — CONSULTS
[facility]                Neonatology Antepartum Counseling Consult:  I was asked to provide antepartum counseling for Dario Nathan at the request of Cynthia Whelan MD secondary to PPROM. Ms. Dario Nathan is currently 24 weeks/ 6 days gestation and has a history significant for:  Patient Active Problem List   Diagnosis     History of premature delivery, currently pregnant     History of gestational diabetes mellitus     Family history of diabetes mellitus     Need for Tdap vaccination     Diabetes mellitus, type 2 (H)     Oligohydramnios antepartum      Betamethasone was administered on .   Ms. Dario Nathan was counseled on the expected hospital course, potential risks, and outcomes associated with an infant born at this gestation. The counseling included: morbidity, mortality, initial delivery room stabilization, respiratory course, lung development, patent ductus arteriosus, retinopathy of prematurity, hyperbilirubinemia, hemodynamic support, infection (including NEC), intraventricular hemorrhage, nutrition, growth and development, and long term outcomes.  I also explained the basic four criteria for discharge: that the baby had to be free of apnea; able to maintain their body temperature; able to feed by bottle or breast well enough to; attain an adequate pattern of weight gain and growth.  The patient had no remaining questions but was encouraged to contact the NICU via their caregivers should any arise.  Please feel free to call and thank you for involving the NICU team in the care of your patient.      Floor Time (min): 5  Face to Face Time (min): 15  Total Time (minutes): 20  More than 50% of my time was spent in direct, face to face, antepartum counseling with the above patient.    Alysia Fregoso, MAGALI, CNP 2023 6:30 PM   Advanced Practice Providers  [facility]

## 2023-01-28 NOTE — PROGRESS NOTES
Antepartum Progress Note    S: Patient feeling well, no complaints or concerns. Continues to have small gushes of clear fluid. Denies vaginal bleeding, contractions. Noting good FM.    O:   BP 98/53   Pulse 82   Temp 98.4  F (36.9  C) (Oral)   Resp 18   LMP 2022   Gen: Appears well, NAD  Abd: Gravid, nontender  Ext: Warm, well perfused, no edema bilaterally    FHT: 140 baseline, moderate variability, present 10x10 accels, no decels   TOCO:0 contractions/ 10 minutes    Labs:   Lab Results   Component Value Date/Time     (H) 2023 02:41 PM    GLC 88 2023 01:49 PM    GLC 89 2023 12:46 PM    GLC 93 2023 11:51 AM     (H) 2023 10:46 AM     (H) 2023 09:50 AM     (H) 2023 08:45 AM     (H) 2023 07:46 AM     (H) 2023 06:46 AM     (H) 2023 05:45 AM       A/P: Dario Nathan is a 27 year old  female at 24w6d by 6w4d US, HD#3 admitted for PPROM. Pregnancy notable for T2DM and hx HSV.    PPROM  - Presumed diagnosis based on positive pooling and new oligohydramnios, however workup somewhat equivocal with negative ROM plus and negative ferning.   - Wet prep +clue cells, s/p flagyl, GC/CT neg, UA neg  - MFM US () EFW 41%, repeat  oligo. Plan for repeat fluid check weekly  - s/p BMZ (-)  - D#3/7 latency abx  - NICU consulted  - s/p CCS consent     Type 2 DM  - PTA levemir 16u qHS (HELD) - now on insulin gtt after being give above steroid course.   - Q1h BG - to continue until BG more controlled.   - Transition back to PTA medications vs high doses insulin prn after gtt no longer needed, per Endocrinology.     HSV  - Acyclovir ppx ord'd    PNC  - Rh pos, Rubella immune, GBS neg  - Tdap due, ord'd    FWB  Appropriate for gestational age   - TID monitoring   -  surveillance beginning 28 weeks  - BMZ   - Magnesium if moving toward deliv <32 weeks  - NICU for delivery      Delivery Plan:  expectant management to 34 weeks    Patient seen and care plan discussed with Dr. Lashaun Pittman.     Davida Gold MD  ObGyn Resident, PGY-3  01/28/2023     Physician Attestation   I saw this patient with the resident and agree with the resident/fellow's findings and plan of care as documented in the note.        Lashaun Pittman MD  Date of Service (when I saw the patient): 01/28/23

## 2023-01-28 NOTE — PROGRESS NOTES
Diabetes Consult Daily  Progress Note          Assessment/Plan:     HPI:  Dario Nathan is a 27 year old with a past medical hx of T2DM who is a  at 24w5d by 6w0d US. Admitted 2023  for leakage of fluid.      Assessment:     1)  Type 2 Diabetes Mellitus; suboptimal control.  A1c 6.7%  2)  Acute hyperglycemia exacerbated by steroids/pregnancy/stress   3)  Anemia         Plan:       -  Determir - held while IV insulin initiated for hard to control hyperglycemia in presence of BMZ     -  Novolog meal coverage 1 unit(s) per 6 g cho AC meals/snacks -> increased to 1:4 g cho at lunch.     -  Continue IV OB high intensity IV insulin drip through BMZ window     -  BG monitoring q1-2 hours per IV insulin drip     -  PTA meds:  N/A    -  Will add A1c - goal in preg is < 6.5% and last was > 3 months ago - pending      -  Hypoglycemia protocol    -  Recommend carb counting protocol    -  Education :  TBD     -  Outpatient follow up:  recommend [facility] Endocrinology vs PCP    -  Recommend eye exam as soon as able - last exam at least 3 years ago       -  Please do not hesitate to contact the team with any questions/concerns.     -  Please notify inpatient diabetes service if changes are planned that will impact glycemia, such as NPO, starting/adjusting steroids, enteral feeding, parenteral feeding, dextrose fluids or procedures.      Management of Diabetes in pregnancy:    BG targets  Less than 95 fasting  Less than 140 one hour post-meal  Less than 120 two hours post-meal  Goal of 0 episodes of blood sugars less than 60         Plan discussed with patient, bedside RN/primary team via this note.           Interval History:     The last 24 hours progress and nursing notes reviewed.      Added prn IV insulin drip to regimen as BG became increasingly harder to get to target, it was initiated last evening.      Pharmacy order set initiated to hold all other orders once IV insulin triggered except  the cho coverage.  All orders held this AM.   Will re-order cho coverage and increase.     BG trend:  Continue > target even with IV insulin             2 hours PP with 1:6 g cho is 186 mg/dL - will further increase cho coverage.          Dario is doing well.  Coping well with her hospitalization.   Denies pain, baby is moving.  Family is visiting.   We review the insulin plan and she understands the IV insulin plan with the expectations it will likely be utilized at least until tomorrow afternoon if now until Monday AM.     Updated A1c 4.9% - most recent hgb is from 12/5/2022     Planned Procedures/surgeries: none  D5W-containing solutions/medications: none    Recent Labs   Lab 01/28/23  0545 01/28/23  0446 01/28/23  0346 01/28/23  0245 01/28/23  0144 01/28/23  0046   * 146* 155* 176* 189* 160*         Nutrition:     Orders Placed This Encounter      Regular Diet Adult        PTA Regimen:     Medications:   Levemir 16 unit(s) once daily      BG monitoring frequency:  2-4 times per day   BG trends at home: 70's - 150's              Review of Systems:   CC: denies all          Medications:   Steroid planning:  BMZ course completed   Tube Feeding: no       Physical Exam:   BP 98/53   Pulse 82   Temp 98.4  F (36.9  C) (Oral)   Resp 18   LMP 07/16/2022     General:   A&O, NAD, pleasant, resting comfortably. Well nourished   HEENT:  NC/AT. MMM, EOMI, Anicteric. Hearing WNL.   Lungs:  unremarkable, no new cough, no SOB  ABD:   rounded, soft  Extremities:  Moving all extremities, no edema, non-tender.    Skin:  warm and dry, no obvious lesions/rash/bleeding  Neuro:  No focal neurological deficits  Psych:   Cooperative, appropriate mood, good eye contact, congruent affect          Data:     Lab Results   Component Value Date    A1C 6.7 10/11/2022    A1C 6.4 05/18/2022    A1C 5.8 03/19/2012        CBC RESULTS: Recent Labs   Lab Test 01/26/23  1749   WBC 9.8   RBC 4.21   HGB 11.6*   HCT 35.4   MCV 84   MCH 27.6    MCHC 32.8   RDW 13.0          Recent Labs   Lab Test 01/28/23  0545 01/28/23  0446 01/18/23  1331 12/05/22  1101   * 146*   < >  --    CR  --   --   --  0.50*    < > = values in this interval not displayed.     Liver Function Studies -   Recent Labs   Lab Test 12/05/22  1101   AST 14   ALT <5*     No results found for: INR      MAGALI Hargrove CNP   Inpatient Diabetes Management Service  Pager - [phone]  Available on [employer]     To contact Endocrine Diabetes service:   From 8AM-4PM: page inpatient diabetes provider who is following the patient that day (see filed or incomplete progress notes/consult notes).  If uncertain of provider assignment: page job code [id number]. (To page job code in-house dial 3 stars, [phone] then enter number).  For questions or updates AFTER HOURS from 4PM-8AM: page the diabetes job code for on call fellow: [phone]    Please notify inpatient diabetes service if changes are planned to steroids, nutrition, or if procedures are planned requiring prolonged NPO status.Diabetes Management Team job code: [id number]      I spent a total of 45 minutes on the date of the encounter doing prep/post-work, chart review, history and exam, documentation and further activities per the note including lab review, multidisciplinary communication, counseling the patient and/or coordinating care regarding acute hyper/hypoglycemic management.  See note for details.

## 2023-01-29 LAB
GLUCOSE BLDC GLUCOMTR-MCNC: 108 MG/DL (ref 70–99)
GLUCOSE BLDC GLUCOMTR-MCNC: 109 MG/DL (ref 70–99)
GLUCOSE BLDC GLUCOMTR-MCNC: 111 MG/DL (ref 70–99)
GLUCOSE BLDC GLUCOMTR-MCNC: 118 MG/DL (ref 70–99)
GLUCOSE BLDC GLUCOMTR-MCNC: 119 MG/DL (ref 70–99)
GLUCOSE BLDC GLUCOMTR-MCNC: 138 MG/DL (ref 70–99)
GLUCOSE BLDC GLUCOMTR-MCNC: 140 MG/DL (ref 70–99)
GLUCOSE BLDC GLUCOMTR-MCNC: 74 MG/DL (ref 70–99)
GLUCOSE BLDC GLUCOMTR-MCNC: 75 MG/DL (ref 70–99)
GLUCOSE BLDC GLUCOMTR-MCNC: 77 MG/DL (ref 70–99)
GLUCOSE BLDC GLUCOMTR-MCNC: 78 MG/DL (ref 70–99)
GLUCOSE BLDC GLUCOMTR-MCNC: 78 MG/DL (ref 70–99)
GLUCOSE BLDC GLUCOMTR-MCNC: 80 MG/DL (ref 70–99)
GLUCOSE BLDC GLUCOMTR-MCNC: 81 MG/DL (ref 70–99)
GLUCOSE BLDC GLUCOMTR-MCNC: 84 MG/DL (ref 70–99)
GLUCOSE BLDC GLUCOMTR-MCNC: 85 MG/DL (ref 70–99)
GLUCOSE BLDC GLUCOMTR-MCNC: 86 MG/DL (ref 70–99)
GLUCOSE BLDC GLUCOMTR-MCNC: 91 MG/DL (ref 70–99)
GLUCOSE BLDC GLUCOMTR-MCNC: 93 MG/DL (ref 70–99)

## 2023-01-29 PROCEDURE — 120N000002 HC R&B MED SURG/OB UMMC

## 2023-01-29 PROCEDURE — 250N000013 HC RX MED GY IP 250 OP 250 PS 637: Performed by: OBSTETRICS & GYNECOLOGY

## 2023-01-29 PROCEDURE — 250N000013 HC RX MED GY IP 250 OP 250 PS 637: Performed by: STUDENT IN AN ORGANIZED HEALTH CARE EDUCATION/TRAINING PROGRAM

## 2023-01-29 PROCEDURE — 99233 SBSQ HOSP IP/OBS HIGH 50: CPT | Performed by: NURSE PRACTITIONER

## 2023-01-29 PROCEDURE — 250N000012 HC RX MED GY IP 250 OP 636 PS 637: Performed by: NURSE PRACTITIONER

## 2023-01-29 PROCEDURE — 99231 SBSQ HOSP IP/OBS SF/LOW 25: CPT | Performed by: OBSTETRICS & GYNECOLOGY

## 2023-01-29 RX ORDER — METRONIDAZOLE 500 MG/1
500 TABLET ORAL 2 TIMES DAILY
Status: DISCONTINUED | OUTPATIENT
Start: 2023-01-29 | End: 2023-01-29

## 2023-01-29 RX ORDER — METRONIDAZOLE 500 MG/1
500 TABLET ORAL 2 TIMES DAILY
Status: DISCONTINUED | OUTPATIENT
Start: 2023-01-29 | End: 2023-02-03

## 2023-01-29 RX ADMIN — ACYCLOVIR 400 MG: 400 TABLET ORAL at 14:05

## 2023-01-29 RX ADMIN — METRONIDAZOLE 500 MG: 500 TABLET ORAL at 22:05

## 2023-01-29 RX ADMIN — AMOXICILLIN 250 MG: 250 CAPSULE ORAL at 14:05

## 2023-01-29 RX ADMIN — ACYCLOVIR 400 MG: 400 TABLET ORAL at 22:05

## 2023-01-29 RX ADMIN — AMOXICILLIN 250 MG: 250 CAPSULE ORAL at 08:01

## 2023-01-29 RX ADMIN — SENNOSIDES AND DOCUSATE SODIUM 1 TABLET: 8.6; 5 TABLET ORAL at 22:05

## 2023-01-29 RX ADMIN — AMOXICILLIN 250 MG: 250 CAPSULE ORAL at 22:04

## 2023-01-29 RX ADMIN — ACYCLOVIR 400 MG: 400 TABLET ORAL at 08:01

## 2023-01-29 RX ADMIN — INSULIN GLARGINE 14 UNITS: 100 INJECTION, SOLUTION SUBCUTANEOUS at 11:26

## 2023-01-29 RX ADMIN — SENNOSIDES AND DOCUSATE SODIUM 2 TABLET: 50; 8.6 TABLET ORAL at 11:25

## 2023-01-29 RX ADMIN — PRENATAL VITAMINS-IRON FUMARATE 27 MG IRON-FOLIC ACID 0.8 MG TABLET 1 TABLET: at 22:05

## 2023-01-29 RX ADMIN — INSULIN ASPART 1 UNITS: 100 INJECTION, SOLUTION INTRAVENOUS; SUBCUTANEOUS at 19:20

## 2023-01-29 ASSESSMENT — ACTIVITIES OF DAILY LIVING (ADL)
ADLS_ACUITY_SCORE: 20

## 2023-01-29 NOTE — PROGRESS NOTES
Antepartum Rounds    NAME:   Dario Nathan    MR #:     [id number]    DATE:  2023       SUBJECTIVE:   No changes.  Still leaking clear fluid, no odor, no bleeding, mucus. Baby is moving well.  Emotionally coping ok.  Happy to be off the insulin gtt.     OBJECTIVE:  BP 98/52   Pulse 82   Temp 98.4  F (36.9  C) (Oral)   Resp 18   LMP 2022   TOCO:no contractions  EFM:   145 baseline, moderate variablility, + accels, small variable decel, Category I   Gen:  NAD   normal respiratory and cardiovascular effort   Abd: nontender   Uterus: nontender, gravid  Bilateral LE's:  no edema, NT    Cx:  deferred    Recent Labs   Lab 23  1301 23  1204 23  1101 23  1002   * 140* 78 78       ASSESSMENT/Plan:   at IUP @ 25w1d HD #4 with PPROM   Pregnancy c/b Type II DM and history of HSV -- patient stable with no signs of sxs of infection,      Patient s/p BMZ x 2  NICU consult done  On acyclovir PPX  On metronidazole for + clue cells on wet prep  Endocrine following for elevated BG due to BMZ, off insulin gtt now, appreciate endocrine care     Plan IOL at 34 wks      Cynthia Whelan MD

## 2023-01-29 NOTE — PROGRESS NOTES
Diabetes Consult Daily  Progress Note          Assessment/Plan:     HPI:  Dario Nathan is a 27 year old with a past medical hx of T2DM who is a  at 24w5d by 6w0d US. Admitted 2023  for leakage of fluid.    Assessment:     1)  Type 2 Diabetes Mellitus; suboptimal control.  A1c 6.7%  2)  Acute hyperglycemia exacerbated by steroids/pregnancy/stress   3)  Anemia         Plan:       -  Change to Lantus 14 unit(s) now (1130) - will be dosed daily     -  Adjust to Novolog meal coverage 1 unit(s) per 14 g cho AC meals/snacks     -  Resume medium resistance sliding scale insulin at 1700 hrs TID AC >100 and HS >120     -  Continue IV OB high intensity IV insulin drip through BMZ window - will transition this afternoon     -  BG monitoring q1-2 hours per IV insulin drip then resume TID AC/HS/02 with 2 hr PP.      -  PTA meds:  N/A    -  Hypoglycemia protocol    -  Recommend carb counting protocol    -  Education :  TBD     -  Outpatient follow up:  recommend [facility] Endocrinology vs PCP    -  Recommend eye exam as soon as able - last exam at least 3 years ago       -  Please do not hesitate to contact the team with any questions/concerns.     -  Please notify inpatient diabetes service if changes are planned that will impact glycemia, such as NPO, starting/adjusting steroids, enteral feeding, parenteral feeding, dextrose fluids or procedures.      Management of Diabetes in pregnancy:    BG targets  Less than 95 fasting  Less than 140 one hour post-meal  Less than 120 two hours post-meal  Goal of 0 episodes of blood sugars less than 60       Plan discussed with patient, bedside RN/primary team via this note.           Interval History:     The last 24 hours progress and nursing notes reviewed.      BG trend:  Stable and in target with lower trends into this AM.  Alg was maintained at 3.  Call to RN who will reduce to Alg 1 when she meets criteria to resume.     Patient has not yet had  breakfast, is sleeping.   Otherwise doing well, no complaints.       Will transition to Lantus this AM   Has not eaten breakfast - may be an opportune time for transition    Meal coverage has been reduced knowing the BMZ is waning - the 1:15 g cho is conservative reduction but unk, will monitor impact with next meal and adjust accordingly.   Assuming 1:12 g cho will be close to her needs.   .   Updated A1c 4.9% - most recent hgb is from 12/5/2022     Planned Procedures/surgeries: none  D5W-containing solutions/medications: none    Recent Labs   Lab 01/29/23  0603 01/29/23  0508 01/29/23  0403 01/29/23  0306 01/29/23  0214 01/29/23  0107   GLC 86 74 84 93 80 91         Nutrition:     Orders Placed This Encounter      Regular Diet Adult        PTA Regimen:     Medications:   Levemir 16 unit(s) once daily      BG monitoring frequency:  2-4 times per day   BG trends at home: 70's - 150's              Review of Systems:   CC: denies all          Medications:   Steroid planning:  BMZ course completed   Tube Feeding: no       Physical Exam:   /54   Pulse 82   Temp 98.4  F (36.9  C) (Oral)   Resp 18   LMP 07/16/2022     General:   A&O, NAD, pleasant, resting comfortably. Well nourished   HEENT:  NC/AT. MMM, EOMI, Anicteric. Hearing WNL.   Lungs:  unremarkable, no new cough, no SOB  ABD:   rounded, soft  Extremities:  Moving all extremities, no edema, non-tender.    Skin:  warm and dry, no obvious lesions/rash/bleeding  Neuro:  No focal neurological deficits  Psych:   Cooperative, appropriate mood, good eye contact, congruent affect          Data:     Lab Results   Component Value Date    A1C 6.7 10/11/2022    A1C 6.4 05/18/2022    A1C 5.8 03/19/2012        CBC RESULTS: Recent Labs   Lab Test 01/26/23  1749   WBC 9.8   RBC 4.21   HGB 11.6*   HCT 35.4   MCV 84   MCH 27.6   MCHC 32.8   RDW 13.0        Recent Labs   Lab Test 01/29/23  0903 01/29/23  0800 01/18/23  1331 12/05/22  1101   GLC 75 77   < >  --    CR   --   --   --  0.50*    < > = values in this interval not displayed.       Liver Function Studies -   Recent Labs   Lab Test 12/05/22  1101   AST 14   ALT <5*     No results found for: INR    MAGALI Hargrove CNP   Inpatient Diabetes Management Service  Pager - [phone]  Available on [employer]     To contact Endocrine Diabetes service:   From 8AM-4PM: page inpatient diabetes provider who is following the patient that day (see filed or incomplete progress notes/consult notes).  If uncertain of provider assignment: page job code [id number]. (To page job code in-house dial 3 stars, 777 then enter number).  For questions or updates AFTER HOURS from 4PM-8AM: page the diabetes job code for on call fellow: [phone]    Please notify inpatient diabetes service if changes are planned to steroids, nutrition, or if procedures are planned requiring prolonged NPO status.Diabetes Management Team job code: [id number]    I spent a total of 35 minutes on the date of the encounter doing prep/post-work, chart review, history and exam, documentation and further activities per the note including lab review, multidisciplinary communication, counseling the patient and/or coordinating care regarding acute hyper/hypoglycemic management.  See note for details.

## 2023-01-30 LAB
GLUCOSE BLDC GLUCOMTR-MCNC: 107 MG/DL (ref 70–99)
GLUCOSE BLDC GLUCOMTR-MCNC: 124 MG/DL (ref 70–99)
GLUCOSE BLDC GLUCOMTR-MCNC: 125 MG/DL (ref 70–99)
GLUCOSE BLDC GLUCOMTR-MCNC: 129 MG/DL (ref 70–99)
GLUCOSE BLDC GLUCOMTR-MCNC: 131 MG/DL (ref 70–99)
GLUCOSE BLDC GLUCOMTR-MCNC: 68 MG/DL (ref 70–99)
GLUCOSE BLDC GLUCOMTR-MCNC: 87 MG/DL (ref 70–99)

## 2023-01-30 PROCEDURE — 250N000013 HC RX MED GY IP 250 OP 250 PS 637: Performed by: OBSTETRICS & GYNECOLOGY

## 2023-01-30 PROCEDURE — 90686 IIV4 VACC NO PRSV 0.5 ML IM: CPT | Performed by: OBSTETRICS & GYNECOLOGY

## 2023-01-30 PROCEDURE — G0008 ADMIN INFLUENZA VIRUS VAC: HCPCS | Performed by: OBSTETRICS & GYNECOLOGY

## 2023-01-30 PROCEDURE — 99233 SBSQ HOSP IP/OBS HIGH 50: CPT | Performed by: NURSE PRACTITIONER

## 2023-01-30 PROCEDURE — 250N000011 HC RX IP 250 OP 636: Performed by: OBSTETRICS & GYNECOLOGY

## 2023-01-30 PROCEDURE — 120N000002 HC R&B MED SURG/OB UMMC

## 2023-01-30 PROCEDURE — 250N000013 HC RX MED GY IP 250 OP 250 PS 637: Performed by: STUDENT IN AN ORGANIZED HEALTH CARE EDUCATION/TRAINING PROGRAM

## 2023-01-30 RX ADMIN — INSULIN ASPART 1 UNITS: 100 INJECTION, SOLUTION INTRAVENOUS; SUBCUTANEOUS at 09:51

## 2023-01-30 RX ADMIN — AMOXICILLIN 250 MG: 250 CAPSULE ORAL at 09:40

## 2023-01-30 RX ADMIN — ACYCLOVIR 400 MG: 400 TABLET ORAL at 20:09

## 2023-01-30 RX ADMIN — METRONIDAZOLE 500 MG: 500 TABLET ORAL at 09:40

## 2023-01-30 RX ADMIN — PRENATAL VITAMINS-IRON FUMARATE 27 MG IRON-FOLIC ACID 0.8 MG TABLET 1 TABLET: at 20:09

## 2023-01-30 RX ADMIN — SENNOSIDES AND DOCUSATE SODIUM 1 TABLET: 8.6; 5 TABLET ORAL at 09:39

## 2023-01-30 RX ADMIN — ACYCLOVIR 400 MG: 400 TABLET ORAL at 09:40

## 2023-01-30 RX ADMIN — AMOXICILLIN 250 MG: 250 CAPSULE ORAL at 14:21

## 2023-01-30 RX ADMIN — AMOXICILLIN 250 MG: 250 CAPSULE ORAL at 20:09

## 2023-01-30 RX ADMIN — SENNOSIDES AND DOCUSATE SODIUM 1 TABLET: 8.6; 5 TABLET ORAL at 20:09

## 2023-01-30 RX ADMIN — METRONIDAZOLE 500 MG: 500 TABLET ORAL at 20:09

## 2023-01-30 RX ADMIN — ACYCLOVIR 400 MG: 400 TABLET ORAL at 14:21

## 2023-01-30 RX ADMIN — INFLUENZA A VIRUS A/VICTORIA/2570/2019 IVR-215 (H1N1) ANTIGEN (FORMALDEHYDE INACTIVATED), INFLUENZA A VIRUS A/DARWIN/9/2021 SAN-010 (H3N2) ANTIGEN (FORMALDEHYDE INACTIVATED), INFLUENZA B VIRUS B/PHUKET/3073/2013 ANTIGEN (FORMALDEHYDE INACTIVATED), AND INFLUENZA B VIRUS B/MICHIGAN/01/2021 ANTIGEN (FORMALDEHYDE INACTIVATED) 0.5 ML: 15; 15; 15; 15 INJECTION, SUSPENSION INTRAMUSCULAR at 12:21

## 2023-01-30 ASSESSMENT — ACTIVITIES OF DAILY LIVING (ADL)
ADLS_ACUITY_SCORE: 20

## 2023-01-30 NOTE — PROGRESS NOTES
Antepartum Progress Note    S: Patient feeling well, no complaints or concerns. Will have leaking of clear fluid bo when laying flat.  Denies vaginal bleeding, contractions. Noting good FM. Wants to know when next ultrasound will occur.    O:   /57 (BP Location: Right arm, Patient Position: Semi-Cardenas's, Cuff Size: Adult Regular)   Pulse 67   Temp 97.8  F (36.6  C) (Oral)   Resp 18   LMP 2022   Gen: Appears well, NAD  Abd: Gravid, nontender  Ext: Warm, well perfused, no edema bilaterally    FHT: 1450 baseline, minimal variability, positive accels, occ variable decels --appropriate for gestational age  TOCO: none    Labs:   Lab Results   Component Value Date/Time     (H) 2023 04:32 PM    GLC 68 (L) 2023 02:10 PM     (H) 2023 12:05 PM     (H) 2023 06:55 AM     (H) 2023 11:28 PM     (H) 2023 07:16 PM     (H) 2023 05:42 PM    GLC 81 2023 03:04 PM     (H) 2023 02:07 PM     (H) 2023 01:01 PM       A/P: Dario Nathan is a 27 year old  female at 24w6d by 6w4d US, HD#5 admitted for PPROM. Pregnancy notable for T2DM and hx HSV.    PPROM  - Presumed diagnosis based on positive pooling and new oligohydramnios, however workup somewhat equivocal with negative ROM plus and negative ferning.   - Wet prep +clue cells, s/p flagyl, GC/CT neg, UA neg  - MFM US () EFW 41%, repeat  oligo. Plan for repeat fluid check weekly, scheduled   - s/p BMZ (-)  - D5/7 latency abx  - NICU consulted  - s/p CCS consent     Type 2 DM  - PTA levemir 16u qHS   - per Endocrinology, appreciate their management.     HSV  - Acyclovir ppx ord'd    PNC  - Rh pos, Rubella immune, GBS neg  - Tdap due, ord'd    FWB  Appropriate for gestational age   - TID monitoring   -  surveillance beginning 28 weeks  - BMZ -  - Magnesium if moving toward deliv <32 weeks  - NICU for delivery  - next growth  ultrasound 2/8      Delivery Plan: expectant management to 34 weeks    Dionna Berry MD

## 2023-01-30 NOTE — PROGRESS NOTES
Diabetes Consult Daily  Progress Note          Assessment/Plan:     HPI:  Dario Nathan is a 27 year old with a past medical hx of T2DM who is a  at 24w5d by 6w0d US. Admitted 2023  for leakage of fluid.    Assessment:     1)  Type 2 Diabetes Mellitus; suboptimal control.  A1c 6.7%  2)  Acute hyperglycemia exacerbated by steroids/pregnancy/stress   3)  Anemia         Plan:       -  Lantus 14 unit(s) (1000) -will move to (0800) for tomorrow and may potentially need an increase     -  Novolog meal coverage 1 unit(s) per 14 g cho AC meals/snacks -> will increase to 1:12 g cho to start at noon.     -  Novolog Medium resistance sliding scale insulin TID AC >100 and HS >120     -  BG monitoring TID AC/HS/02 with 2 hr PP.      -  PTA meds:  N/A    -  Hypoglycemia protocol    -  Recommend carb counting protocol    -  Education :  TBD     -  Outpatient follow up:  recommend [facility] Endocrinology vs PCP    -  Recommend eye exam as soon as able - last exam at least 3 years ago       -  Please do not hesitate to contact the team with any questions/concerns.     -  Please notify inpatient diabetes service if changes are planned that will impact glycemia, such as NPO, starting/adjusting steroids, enteral feeding, parenteral feeding, dextrose fluids or procedures.      Management of Diabetes in pregnancy:    BG targets  Less than 95 fasting  Less than 140 one hour post-meal  Less than 120 two hours post-meal  Goal of 0 episodes of blood sugars less than 60       Plan discussed with patient, bedside RN/primary team via this note.           Interval History:     The last 24 hours progress and nursing notes reviewed.      BMZ window passed - last dose 2023 at 1830 hrs.  Transitioned off IV insulin yesterday afternoon and trending in target over the interval.      2 hr PP this AM > target, will increase meal insulin.   Will watch for need to increase her basal in AM     Doing well,  tolerating in-patient stay.  No focal complaints.     BG trend:        Planned Procedures/surgeries: none  D5W-containing solutions/medications: none    Recent Labs   Lab 01/29/23  1916 01/29/23  1742 01/29/23  1504 01/29/23  1407 01/29/23  1301 01/29/23  1204   * 119* 81 109* 138* 140*         Nutrition:     Orders Placed This Encounter      Regular Diet Adult        PTA Regimen:     Medications:   Levemir 16 unit(s) once daily      BG monitoring frequency:  2-4 times per day   BG trends at home: 70's - 150's            Review of Systems:   CC: denies all          Medications:   Steroid planning:  BMZ course completed   Tube Feeding: no       Physical Exam:   /53   Pulse 82   Temp 97.8  F (36.6  C) (Oral)   Resp 18   LMP 07/16/2022     General:   A&O, NAD, pleasant, resting comfortably. Well nourished   HEENT:  NC/AT. MMM, EOMI, Anicteric. Hearing WNL.   Lungs:  unremarkable, no new cough, no SOB  ABD:   rounded, soft  Extremities:  Moving all extremities, no edema, non-tender.    Skin:  warm and dry, no obvious lesions/rash/bleeding  Neuro:  No focal neurological deficits  Psych:   Cooperative, appropriate mood, good eye contact, congruent affect          Data:     Lab Results   Component Value Date    A1C 6.7 10/11/2022    A1C 6.4 05/18/2022    A1C 5.8 03/19/2012        CBC RESULTS: Recent Labs   Lab Test 01/26/23  1749   WBC 9.8   RBC 4.21   HGB 11.6*   HCT 35.4   MCV 84   MCH 27.6   MCHC 32.8   RDW 13.0        Recent Labs   Lab Test 01/30/23  1205 01/30/23  0655 01/18/23  1331 12/05/22  1101   * 107*   < >  --    CR  --   --   --  0.50*    < > = values in this interval not displayed.       Liver Function Studies -   Recent Labs   Lab Test 12/05/22  1101   AST 14   ALT <5*     No results found for: INR    MAGALI Hargrove CNP   Inpatient Diabetes Management Service  Pager - [phone]  Available on [employer]     To contact Endocrine Diabetes service:   From 8AM-4PM: page  inpatient diabetes provider who is following the patient that day (see filed or incomplete progress notes/consult notes).  If uncertain of provider assignment: page job code [id number]. (To page job code in-house dial 3 stars, 777 then enter number).  For questions or updates AFTER HOURS from 4PM-8AM: page the diabetes job code for on call fellow: [phone]    Please notify inpatient diabetes service if changes are planned to steroids, nutrition, or if procedures are planned requiring prolonged NPO status.Diabetes Management Team job code: [id number]    I spent a total of 35 minutes on the date of the encounter doing prep/post-work, chart review, history and exam, documentation and further activities per the note including lab review, multidisciplinary communication, counseling the patient and/or coordinating care regarding acute hyper/hypoglycemic management.  See note for details.

## 2023-01-31 ENCOUNTER — PRE VISIT (OUTPATIENT)
Dept: ENDOCRINOLOGY | Facility: CLINIC | Age: 28
End: 2023-01-31

## 2023-01-31 LAB
GLUCOSE BLDC GLUCOMTR-MCNC: 112 MG/DL (ref 70–99)
GLUCOSE BLDC GLUCOMTR-MCNC: 117 MG/DL (ref 70–99)
GLUCOSE BLDC GLUCOMTR-MCNC: 135 MG/DL (ref 70–99)
GLUCOSE BLDC GLUCOMTR-MCNC: 142 MG/DL (ref 70–99)
GLUCOSE BLDC GLUCOMTR-MCNC: 161 MG/DL (ref 70–99)
GLUCOSE BLDC GLUCOMTR-MCNC: 183 MG/DL (ref 70–99)
GLUCOSE BLDC GLUCOMTR-MCNC: 74 MG/DL (ref 70–99)
GLUCOSE BLDC GLUCOMTR-MCNC: 83 MG/DL (ref 70–99)

## 2023-01-31 PROCEDURE — 99232 SBSQ HOSP IP/OBS MODERATE 35: CPT | Performed by: PHYSICIAN ASSISTANT

## 2023-01-31 PROCEDURE — 250N000013 HC RX MED GY IP 250 OP 250 PS 637: Performed by: STUDENT IN AN ORGANIZED HEALTH CARE EDUCATION/TRAINING PROGRAM

## 2023-01-31 PROCEDURE — 99231 SBSQ HOSP IP/OBS SF/LOW 25: CPT | Mod: GC | Performed by: OBSTETRICS & GYNECOLOGY

## 2023-01-31 PROCEDURE — 120N000002 HC R&B MED SURG/OB UMMC

## 2023-01-31 PROCEDURE — 250N000013 HC RX MED GY IP 250 OP 250 PS 637: Performed by: OBSTETRICS & GYNECOLOGY

## 2023-01-31 RX ADMIN — AMOXICILLIN 250 MG: 250 CAPSULE ORAL at 08:14

## 2023-01-31 RX ADMIN — METRONIDAZOLE 500 MG: 500 TABLET ORAL at 19:52

## 2023-01-31 RX ADMIN — ACETAMINOPHEN 650 MG: 325 TABLET ORAL at 22:15

## 2023-01-31 RX ADMIN — ACYCLOVIR 400 MG: 400 TABLET ORAL at 08:14

## 2023-01-31 RX ADMIN — METRONIDAZOLE 500 MG: 500 TABLET ORAL at 08:14

## 2023-01-31 RX ADMIN — INSULIN ASPART 1 UNITS: 100 INJECTION, SOLUTION INTRAVENOUS; SUBCUTANEOUS at 19:39

## 2023-01-31 RX ADMIN — AMOXICILLIN 250 MG: 250 CAPSULE ORAL at 19:42

## 2023-01-31 RX ADMIN — ACYCLOVIR 400 MG: 400 TABLET ORAL at 14:17

## 2023-01-31 RX ADMIN — SENNOSIDES AND DOCUSATE SODIUM 1 TABLET: 8.6; 5 TABLET ORAL at 08:14

## 2023-01-31 RX ADMIN — AMOXICILLIN 250 MG: 250 CAPSULE ORAL at 14:17

## 2023-01-31 RX ADMIN — PRENATAL VITAMINS-IRON FUMARATE 27 MG IRON-FOLIC ACID 0.8 MG TABLET 1 TABLET: at 19:52

## 2023-01-31 RX ADMIN — SENNOSIDES AND DOCUSATE SODIUM 2 TABLET: 50; 8.6 TABLET ORAL at 19:52

## 2023-01-31 RX ADMIN — ACYCLOVIR 400 MG: 400 TABLET ORAL at 19:42

## 2023-01-31 ASSESSMENT — ACTIVITIES OF DAILY LIVING (ADL)
ADLS_ACUITY_SCORE: 20

## 2023-01-31 NOTE — PROGRESS NOTES
Diabetes Consult Daily  Progress Note          Assessment/Plan:     HPI:  Dario Nathan is a 27 year old with a past medical hx of T2DM who is a  at 24w5d by 6w0d US. Admitted 2023  for leakage of fluid.    Assessment:     1)  Type 2 Diabetes Mellitus; suboptimal control.  A1c 6.7%  2)  Acute hyperglycemia exacerbated by steroids/pregnancy/stress   3)  Anemia         Plan:       -  Lantus 14 unit(s) (given at 1100--> ordered for 10:00) -will move to (0800) for tomorrow     -  Novolog meal coverage 1 unit(s) per 12 g cho AC meals/snacks -> will increase to 1:11 g cho to start this am.     -  Novolog Medium resistance sliding scale insulin TID AC >100 and HS >120     -  BG monitoring TID AC/HS/02 with 2 hr PP.      -  PTA meds:  N/A    -  Hypoglycemia protocol    -  Recommend carb counting protocol    -  Education :  TBD     -  Outpatient follow up:  recommend [facility] Endocrinology vs PCP    -  Recommend eye exam as soon as able - last exam at least 3 years ago       -  Please do not hesitate to contact the team with any questions/concerns.     -  Please notify inpatient diabetes service if changes are planned that will impact glycemia, such as NPO, starting/adjusting steroids, enteral feeding, parenteral feeding, dextrose fluids or procedures.      Sun Mitchell PA-C  Inpatient Diabetes Service  Pager   [phone]    Management of Diabetes in pregnancy:    BG targets  Less than 95 fasting  Less than 140 one hour post-meal  Less than 120 two hours post-meal  Goal of 0 episodes of blood sugars less than 60       Plan discussed with patient in person, bedside RN in person and primary team via this note.           Interval History:     The last 24 hours progress and nursing notes reviewed.      BMZ window passed - last dose 2023 at 1830 hrs.  Transitioned off IV insulin yesterday afternoon and trending in target over the interval.      1.5 hr PP this AM > target, will  increase meal insulin.   Will watch for need to increase her basal in AM but she was 83 this morning so did not increase    Doing well, tolerating in-patient stay.  No focal complaints.     BG trend:      Planned Procedures/surgeries: none  D5W-containing solutions/medications: none    Recent Labs   Lab 01/31/23  0206 01/30/23  2235 01/30/23 2001 01/30/23  1632 01/30/23  1410 01/30/23  1205   * 125* 87 124* 68* 131*         Nutrition:     Orders Placed This Encounter      Regular Diet Adult        PTA Regimen:     Medications:   Levemir 16 unit(s) once daily      BG monitoring frequency:  2-4 times per day   BG trends at home: 70's - 150's            Review of Systems:   CC: denies all          Medications:   Steroid planning:  BMZ course completed   Tube Feeding: no       Physical Exam:   BP 99/54 (BP Location: Right arm, Patient Position: Right side, Cuff Size: Adult Regular)   Pulse 67   Temp 98  F (36.7  C) (Oral)   Resp 18   Wt 68.9 kg (151 lb 14.4 oz)   LMP 07/16/2022   BMI 27.78 kg/m     Spoke with patient by phone  General:   A&O, NAD,   Psych:   Cooperative, appropriate mood,         Data:     Lab Results   Component Value Date    A1C 6.7 10/11/2022    A1C 6.4 05/18/2022    A1C 5.8 03/19/2012        ROUTINE IP LABS (Last four results)  BMPRecent Labs   Lab 01/31/23  1422 01/31/23  1112 01/31/23  0902 01/31/23  0206   GLC 74 161* 83 112*     CBC  Recent Labs   Lab 01/26/23  1749   WBC 9.8   RBC 4.21   HGB 11.6*   HCT 35.4   MCV 84   MCH 27.6   MCHC 32.8   RDW 13.0        INRNo lab results found in last 7 days.      No results found for: INR    Sun Mitchell PA-C  Inpatient Diabetes Service  Pager   [phone]      To contact Endocrine Diabetes service:   From 8AM-4PM: page inpatient diabetes provider who is following the patient that day (see filed or incomplete progress notes/consult notes).  If uncertain of provider assignment: page job code [id number]. (To page job code in-house dial 3  stars, 777 then enter number).  For questions or updates AFTER HOURS from 4PM-8AM: page the diabetes job code for on call fellow: [phone]    Please notify inpatient diabetes service if changes are planned to steroids, nutrition, or if procedures are planned requiring prolonged NPO status.Diabetes Management Team job code: [email or web address]    I spent a total of 35 minutes on the date of the encounter doing prep/post-work, chart review, history and exam, documentation and further activities per the note including lab review, multidisciplinary communication, counseling the patient and/or coordinating care regarding acute hyper/hypoglycemic management.  See note for details.

## 2023-01-31 NOTE — PROGRESS NOTES
Antepartum Progress Note    S: Patient feeling well this morning. Still adjusting to normal sleep schedule after disruption of the insulin drip a few nights ago. No issues or concerns overnight otherwise. Continues to leak clear fluids, using about 1 pad per day. No foul odor, abnormal discharge, contractions/cramping, or vaginal bleeding. No fevers/chills or abdominal pain. Active fetal movement.     O:   BP 99/57   Pulse 67   Temp 98.7  F (37.1  C) (Oral)   Resp 16   Wt 68.9 kg (151 lb 14.4 oz)   LMP 2022   BMI 27.78 kg/m       Gen: Appears well, NAD  Abd: Gravid, nontender  Ext: Warm, well perfused, no edema bilaterally    FHT: 145 bpm baseline, moderate variability, no accels, no decels  TOCO: none    Labs:   Lab Results   Component Value Date/Time    GLC 83 2023 09:02 AM     (H) 2023 02:06 AM     (H) 2023 10:35 PM    GLC 87 2023 08:01 PM     (H) 2023 04:32 PM    GLC 68 (L) 2023 02:10 PM     (H) 2023 12:05 PM     (H) 2023 06:55 AM     (H) 2023 11:28 PM     (H) 2023 07:16 PM       A/P: Dario Nathan is a 27 year old  female at 25w3d by 6w4d US, HD#6 admitted for PPROM. Pregnancy notable for T2DM and hx HSV.    PPROM  - Presumed diagnosis based on positive pooling and new oligohydramnios, however workup somewhat equivocal with negative ROM plus and negative ferning. Continues to leak fluid however, consistent w/ diagnosis.  - Wet prep +clue cells, s/p flagyl, GC/CT neg, UA neg  - MFM US () EFW 41%, repeat  oligo. Plan for repeat fluid check weekly, scheduled   - s/p BMZ (-)  - D#6/7 latency abx  - S/p NICU consult  - s/p CCS consent     Type 2 DM  - Continue levemir 14u qAM, Novolog 1:12 cho, mSSI  - s/p insulin gtt (-) during steroid course  - Endocrinology following closely, appreciate their management.     HSV  - Acyclovir ppx ord'd    PNC  - Rh pos, Rubella  immune, GBS neg  - Tdap due, ord'd    FWB  Appropriate for gestational age   - TID monitoring   -  surveillance beginning 28 weeks  - S/p BMZ -  - Magnesium if moving toward deliv <32 weeks  - NICU for delivery  - next growth ultrasound       Delivery Plan: expectant management to 34 weeks unless delivery clinically indicated sooner    Doris Sams MD  ObGyn, PGY-3  2023 9:53 AM        Physician Attestation   I saw this patient with the resident and agree with the resident/fellow's findings and plan of care as documented in the note.      Key findings: 27 year old  at 25w3d with PPROM. Stable without s/sx  labor, infection, abruption, fetal distress. Continue latency antibiotics. Inpatient until delivery at 34 weeks, sooner if indicated.     Please see A&P for additional details of medical decision making.        Erin Rivas MD  Date of Service (when I saw the patient): 23

## 2023-02-01 ENCOUNTER — APPOINTMENT (OUTPATIENT)
Dept: ULTRASOUND IMAGING | Facility: CLINIC | Age: 28
End: 2023-02-01
Payer: COMMERCIAL

## 2023-02-01 LAB
GLUCOSE BLDC GLUCOMTR-MCNC: 104 MG/DL (ref 70–99)
GLUCOSE BLDC GLUCOMTR-MCNC: 132 MG/DL (ref 70–99)
GLUCOSE BLDC GLUCOMTR-MCNC: 134 MG/DL (ref 70–99)
GLUCOSE BLDC GLUCOMTR-MCNC: 171 MG/DL (ref 70–99)
GLUCOSE BLDC GLUCOMTR-MCNC: 80 MG/DL (ref 70–99)
GLUCOSE BLDC GLUCOMTR-MCNC: 80 MG/DL (ref 70–99)
GLUCOSE BLDC GLUCOMTR-MCNC: 81 MG/DL (ref 70–99)
GLUCOSE BLDC GLUCOMTR-MCNC: 95 MG/DL (ref 70–99)

## 2023-02-01 PROCEDURE — 250N000013 HC RX MED GY IP 250 OP 250 PS 637: Performed by: OBSTETRICS & GYNECOLOGY

## 2023-02-01 PROCEDURE — 76815 OB US LIMITED FETUS(S): CPT | Mod: 26 | Performed by: OBSTETRICS & GYNECOLOGY

## 2023-02-01 PROCEDURE — 250N000013 HC RX MED GY IP 250 OP 250 PS 637: Performed by: STUDENT IN AN ORGANIZED HEALTH CARE EDUCATION/TRAINING PROGRAM

## 2023-02-01 PROCEDURE — 120N000002 HC R&B MED SURG/OB UMMC

## 2023-02-01 PROCEDURE — 76815 OB US LIMITED FETUS(S): CPT

## 2023-02-01 PROCEDURE — 99231 SBSQ HOSP IP/OBS SF/LOW 25: CPT | Mod: GC | Performed by: OBSTETRICS & GYNECOLOGY

## 2023-02-01 PROCEDURE — 99232 SBSQ HOSP IP/OBS MODERATE 35: CPT | Performed by: NURSE PRACTITIONER

## 2023-02-01 RX ADMIN — ACYCLOVIR 400 MG: 400 TABLET ORAL at 08:06

## 2023-02-01 RX ADMIN — AMOXICILLIN 250 MG: 250 CAPSULE ORAL at 21:09

## 2023-02-01 RX ADMIN — METRONIDAZOLE 500 MG: 500 TABLET ORAL at 08:06

## 2023-02-01 RX ADMIN — AMOXICILLIN 250 MG: 250 CAPSULE ORAL at 13:58

## 2023-02-01 RX ADMIN — PRENATAL VITAMINS-IRON FUMARATE 27 MG IRON-FOLIC ACID 0.8 MG TABLET 1 TABLET: at 21:10

## 2023-02-01 RX ADMIN — ACYCLOVIR 400 MG: 400 TABLET ORAL at 21:10

## 2023-02-01 RX ADMIN — ACYCLOVIR 400 MG: 400 TABLET ORAL at 13:58

## 2023-02-01 RX ADMIN — SENNOSIDES AND DOCUSATE SODIUM 2 TABLET: 50; 8.6 TABLET ORAL at 08:07

## 2023-02-01 RX ADMIN — METRONIDAZOLE 500 MG: 500 TABLET ORAL at 21:10

## 2023-02-01 RX ADMIN — AMOXICILLIN 250 MG: 250 CAPSULE ORAL at 08:07

## 2023-02-01 ASSESSMENT — ACTIVITIES OF DAILY LIVING (ADL)
ADLS_ACUITY_SCORE: 20

## 2023-02-01 NOTE — PROGRESS NOTES
Diabetes Consult Daily  Progress Note          Assessment/Plan:     HPI:  Dario Nathan is a 27 year old with a past medical hx of T2DM who is a  at 24w5d by 6w0d US. Admitted 2023  for leakage of fluid.    Assessment:     1)  Type 2 Diabetes Mellitus; suboptimal control.  A1c 6.7%  2)  Acute hyperglycemia exacerbated by steroids/pregnancy/stress   3)  Anemia         Plan:       -  Lantus 14 unit(s) daily (0800)    -  Novolog meal coverage 1 unit(s) per 10 g cho AC meals/snacks - addm: increase to 1:9 to start at lunch.     -  Novolog Medium resistance sliding scale insulin TID AC >100 and HS >120     -  BG monitoring TID AC/HS/02 with 2 hr PP.      -  PTA meds:  N/A    -  Hypoglycemia protocol    -  Recommend carb counting protocol    -  Education :  TBD     -  Outpatient follow up:  recommend [facility] Endocrinology vs PCP    -  Recommend eye exam as soon as able - last exam at least 3 years ago       -  Please do not hesitate to contact the team with any questions/concerns.     -  Please notify inpatient diabetes service if changes are planned that will impact glycemia, such as NPO, starting/adjusting steroids, enteral feeding, parenteral feeding, dextrose fluids or procedures.      Management of Diabetes in pregnancy:    BG targets  Less than 95 fasting  Less than 140 one hour post-meal  Less than 120 two hours post-meal  Goal of 0 episodes of blood sugars less than 60       Plan discussed with patient in person, bedside RN in person and primary team via this note.         Interval History:     The last 24 hours progress and nursing notes reviewed.      Noting more excursions with 2 hr PP BG checks.   Dario aware IDS will be watching today and increasing PRN if > target.     Doing well, tolerating in-patient stay.  No focal complaints.     BG trend:            Fasting in target at 81 this AM  Review of her PP readings yesterday > target at 1112 and 1648 and 2204 - the cho  coverage increased to 1:10 g cho.  Will trend this and increase incrementally as indicated.        Planned Procedures/surgeries: none  D5W-containing solutions/medications: none    Recent Labs   Lab 02/01/23  0232 01/31/23  2314 01/31/23  2204 01/31/23  1933 01/31/23  1648 01/31/23  1422   * 117* 135* 142* 183* 74         Nutrition:     Orders Placed This Encounter      Regular Diet Adult        PTA Regimen:     Medications:   Levemir 16 unit(s) once daily   BG monitoring frequency:  2-4 times per day   BG trends at home: 70's - 150's            Review of Systems:   CC: denies all          Medications:   Steroid planning:  BMZ course completed   Tube Feeding: no       Physical Exam:   BP (!) 86/49 (BP Location: Right arm, Patient Position: Left side, Cuff Size: Adult Small)   Pulse 67   Temp 98  F (36.7  C) (Oral)   Resp 18   Wt 68.9 kg (151 lb 14.4 oz)   LMP 07/16/2022   BMI 27.78 kg/m       General:  pleasant, in no acute distress.  HEENT:  Hearing at level of conversation   Lungs:  unremarkable, no new cough, no SOB  Mental Status:  Alert and oriented x3  Psych:   Cooperative, full/appropriate affect         Data:     Lab Results   Component Value Date    A1C 6.7 10/11/2022    A1C 6.4 05/18/2022    A1C 5.8 03/19/2012        ROUTINE IP LABS (Last four results)  BMP  Recent Labs   Lab 02/01/23  0232 01/31/23  2314 01/31/23  2204 01/31/23  1933   * 117* 135* 142*     CBC RESULTS: Recent Labs   Lab Test 01/26/23  1749   WBC 9.8   RBC 4.21   HGB 11.6*   HCT 35.4   MCV 84   MCH 27.6   MCHC 32.8   RDW 13.0        Recent Labs   Lab Test 02/01/23  0613 02/01/23  0232 01/18/23  1331 12/05/22  1101   GLC 81 104*   < >  --    CR  --   --   --  0.50*    < > = values in this interval not displayed.       MAGALI Scott CNP   Inpatient Diabetes Management Service  Pager - [phone]  Available on [employer] - when on duty.     To contact Endocrine Diabetes service:   From 8AM-4PM: page inpatient  diabetes provider who is following the patient that day (see filed or incomplete progress notes/consult notes).  If uncertain of provider assignment: page job code [id number]. (To page job code in-house dial 3 stars, [phone] then enter number).  For questions or updates AFTER HOURS from 4PM-8AM: page the diabetes job code for on call fellow: [phone]    Please notify inpatient diabetes service if changes are planned to steroids, nutrition, or if procedures are planned requiring prolonged NPO status.Diabetes Management Team job code: [id number]    I spent a total of 25 minutes on the date of the encounter doing prep/post-work, chart review, history and exam, documentation and further activities per the note including lab review, multidisciplinary communication, counseling the patient and/or coordinating care regarding acute hyper/hypoglycemic management.  See note for details.

## 2023-02-01 NOTE — PROGRESS NOTES
Antepartum Progress Note    S: Patient feeling well this morning. Still leaking clear fluids without any bleeding or odor. Denies contractions/cramping or vaginal bleeding. No fevers/chills or abdominal pain. Active fetal movement. Plans to meet with the support group today. No new questions or concerns.    O:   BP 91/50   Pulse 67   Temp 98.5  F (36.9  C) (Oral)   Resp 16   Wt 68.9 kg (151 lb 14.4 oz)   LMP 2022   BMI 27.78 kg/m       Gen: Appears well, NAD  Abd: Gravid, nontender  Ext: Warm, well perfused, no edema bilaterally    2 AM Monitoring [id number]  FHT: 145 bpm baseline, moderate variability, + 10x10 accels, 1 isolated variable decel  TOCO: quiet    Labs:   Lab Results   Component Value Date/Time    GLC 81 2023 06:13 AM     (H) 2023 02:32 AM     (H) 2023 11:14 PM     (H) 2023 10:04 PM     (H) 2023 07:33 PM     (H) 2023 04:48 PM    GLC 74 2023 02:22 PM     (H) 2023 11:12 AM    GLC 83 2023 09:02 AM     (H) 2023 02:06 AM       A/P: Dario Nathan is a 27 year old  female at 25w4d by 6w4d US, HD#7 admitted for PPROM. Pregnancy notable for T2DM and hx HSV.    PPROM  - Presumed diagnosis based on positive pooling and new oligohydramnios, however workup somewhat equivocal with negative ROM plus and negative ferning. Continues to leak fluid however, consistent w/ diagnosis.  - Wet prep +clue cells, s/p flagyl, GC/CT neg, UA neg  - MFM US () EFW 41%, repeat  oligo. Fluid check US this morning, read pending. Plan for repeat fluid check weekly, scheduled  with growth measurements.  - s/p BMZ (-)  - D#7/ latency abx  - S/p NICU consult  - s/p CCS consent     Type 2 DM  - Continue levemir 14u qAM, Novolog 1:12 keily, VioletI  - s/p insulin gtt (-) during steroid course  - Endocrinology following closely, appreciate their management.     HSV  - Acyclovir ppx ord'd    PNC  -  Rh pos, Rubella immune, GBS neg  - Tdap due, ord'd    FWB  Appropriate for gestational age   - TID monitoring   -  surveillance beginning 28 weeks  - S/p BMZ -  - Magnesium if moving toward deliv <32 weeks  - NICU for delivery  - Next growth ultrasound     Delivery Plan: expectant management to 34 weeks unless delivery clinically indicated sooner    Maura Irene MD  OB/GYN, PGY-3  2023, 10:03 AM

## 2023-02-02 LAB
GLUCOSE BLDC GLUCOMTR-MCNC: 107 MG/DL (ref 70–99)
GLUCOSE BLDC GLUCOMTR-MCNC: 78 MG/DL (ref 70–99)
GLUCOSE BLDC GLUCOMTR-MCNC: 80 MG/DL (ref 70–99)
GLUCOSE BLDC GLUCOMTR-MCNC: 93 MG/DL (ref 70–99)
GLUCOSE BLDC GLUCOMTR-MCNC: 93 MG/DL (ref 70–99)

## 2023-02-02 PROCEDURE — 120N000002 HC R&B MED SURG/OB UMMC

## 2023-02-02 PROCEDURE — 99231 SBSQ HOSP IP/OBS SF/LOW 25: CPT | Mod: GC | Performed by: OBSTETRICS & GYNECOLOGY

## 2023-02-02 PROCEDURE — 250N000013 HC RX MED GY IP 250 OP 250 PS 637: Performed by: STUDENT IN AN ORGANIZED HEALTH CARE EDUCATION/TRAINING PROGRAM

## 2023-02-02 PROCEDURE — 99231 SBSQ HOSP IP/OBS SF/LOW 25: CPT | Performed by: NURSE PRACTITIONER

## 2023-02-02 PROCEDURE — 250N000013 HC RX MED GY IP 250 OP 250 PS 637: Performed by: OBSTETRICS & GYNECOLOGY

## 2023-02-02 RX ADMIN — SENNOSIDES AND DOCUSATE SODIUM 1 TABLET: 8.6; 5 TABLET ORAL at 08:44

## 2023-02-02 RX ADMIN — AMOXICILLIN 250 MG: 250 CAPSULE ORAL at 08:44

## 2023-02-02 RX ADMIN — ACYCLOVIR 400 MG: 400 TABLET ORAL at 08:45

## 2023-02-02 RX ADMIN — ACYCLOVIR 400 MG: 400 TABLET ORAL at 15:34

## 2023-02-02 RX ADMIN — METRONIDAZOLE 500 MG: 500 TABLET ORAL at 21:33

## 2023-02-02 RX ADMIN — INSULIN ASPART 1 UNITS: 100 INJECTION, SOLUTION INTRAVENOUS; SUBCUTANEOUS at 18:49

## 2023-02-02 RX ADMIN — ACYCLOVIR 400 MG: 400 TABLET ORAL at 21:34

## 2023-02-02 RX ADMIN — METRONIDAZOLE 500 MG: 500 TABLET ORAL at 08:44

## 2023-02-02 RX ADMIN — SENNOSIDES AND DOCUSATE SODIUM 1 TABLET: 8.6; 5 TABLET ORAL at 21:34

## 2023-02-02 RX ADMIN — AMOXICILLIN 250 MG: 250 CAPSULE ORAL at 15:34

## 2023-02-02 RX ADMIN — PRENATAL VITAMINS-IRON FUMARATE 27 MG IRON-FOLIC ACID 0.8 MG TABLET 1 TABLET: at 21:33

## 2023-02-02 ASSESSMENT — ACTIVITIES OF DAILY LIVING (ADL)
ADLS_ACUITY_SCORE: 20

## 2023-02-02 NOTE — PROGRESS NOTES
"Antepartum Progress Note    S: Patient feeling well this morning. Still leaking clear fluids without any bleeding or change in consistency/color.  Denies contractions/cramping. Had a small \"sting\" in her vagina yesterday which resolved spontaneously. No fevers/chills or abdominal pain. Active fetal movement. No new questions or concerns.    O:   Patient Vitals for the past 24 hrs:   BP Temp Temp src Resp   23 0955 103/50 97.8  F (36.6  C) Oral --   23 0609 104/53 97.9  F (36.6  C) Oral 16   23 2334 106/53 98.5  F (36.9  C) Oral 16   23 1823 112/51 98.1  F (36.7  C) Oral 16   23 1552 -- 98  F (36.7  C) Oral 16   23 1305 98/56 98.5  F (36.9  C) Oral --   ]]     Gen: Appears well, NAD  Abd: Gravid, nontender  Ext: Warm, well perfused, no edema bilaterally    2/2 AM Monitoring [id number]  FHT: 140 bpm baseline, moderate to minimal variability, + 10x10 accels, no decels   TOCO: 0 contractions/ 10 minutes    Labs:   Lab Results   Component Value Date/Time    GLC 80 2023 08:49 AM    GLC 93 2023 02:01 AM     (H) 2023 11:32 PM    GLC 95 2023 08:44 PM     (H) 2023 06:17 PM    GLC 80 2023 03:52 PM     (H) 2023 11:29 AM    GLC 80 2023 09:19 AM    GLC 81 2023 06:13 AM     (H) 2023 02:32 AM       A/P: Dario Nathan is a 27 year old  female at 25w5d by 6w4d US, HD#8 admitted for PPROM. Pregnancy notable for T2DM and hx HSV.    PPROM  - Presumed diagnosis based on positive pooling and new oligohydramnios, however workup somewhat equivocal with negative ROM plus and negative ferning. Continues to leak fluid however, consistent w/ diagnosis.  - Wet prep +clue cells, s/p flagyl, GC/CT neg, UA neg  - MFM US () EFW 41%, repeat  oligo. Fluid check US 2023 shows MVP 2.8cm. Plan for repeat fluid check weekly, scheduled  with growth measurements.  - s/p BMZ (-)  - Has last 2 doses of latency " abx today   - S/p NICU consult  - Patient found to be breech on  ultrasound. Discussed with patient who expressed understanding. Emphasized importance of alerting the team if she feels anything change or something in her vagina.  Discussed risk of cord prolapse. s/p CCS consent     Type 2 DM  - Continue levemir 14u qAM, Novolog 1:12 cho, mSSI  - s/p insulin gtt (-) during steroid course  - Endocrinology following closely, appreciate their management.     HSV  - Acyclovir ppx ord'd    PNC  - Rh pos, Rubella immune, GBS neg  - Tdap due, ord'd    FWB  Appropriate for gestational age   - TID monitoring   -  surveillance beginning 28 weeks  - S/p BMZ -  - Magnesium if moving toward deliv <32 weeks  - NICU for delivery  - Next growth ultrasound     Delivery Plan: expectant management to 34 weeks unless delivery clinically indicated sooner    Gudelia Fajardo MD PGY2    Physician Attestation   I saw this patient with the resident and agree with the resident/fellow's findings and plan of care as documented in the note.      Lashaun Pittman MD  Date of Service (when I saw the patient): 23

## 2023-02-02 NOTE — PROGRESS NOTES
Data: Afebrile. Leaking scant  amounts of amniotic fluid. Contraction pattern stable and within parameters. Fetal assessment Appropriate for Gestational Age. Signs and symptoms of infection not seen..Blood sugars have been within the normal range ,insulin administered as ordered.  Interventions: Monitor vital signs and indicators of infection every 4 hours while awake. Continue uterine/fetal assessment every 8 hourly as ordered. Activity level: Bed rest with bathroom privileges. Preventive measures include Medications and Positioning. Encourage active range of motion and frequent position changes.  Plan: Continue expectant management. Observe for and notify care provider of indicators of progressing labor, signs/symptoms of infection, or fetal/maternal compromise.

## 2023-02-02 NOTE — PROGRESS NOTES
Diabetes Consult Daily  Progress Note          Assessment/Plan:     HPI:  Dario Nathan is a 27 year old with a past medical hx of T2DM who is a  at 24w5d by 6w0d US. Admitted 2023  for leakage of fluid.    Assessment:     1)  Type 2 Diabetes Mellitus; suboptimal control.  A1c 6.7%  2)  Acute hyperglycemia exacerbated by steroids/pregnancy/stress   3)  Anemia         Plan:       -  Lantus 14 unit(s) daily (0800)    -  Increase Novolog meal coverage 1 unit(s) per 7.5 g cho AC meals/snacks    -  Novolog Medium resistance sliding scale insulin TID AC >100 and HS >120     -  BG monitoring TID AC/HS/02 with 2 hr PP.      -  PTA meds:  N/A    -  Hypoglycemia protocol    -  Recommend carb counting protocol    -  Education :  TBD     -  Outpatient follow up:  recommend [facility] Endocrinology vs PCP    -  Recommend eye exam as soon as able - last exam at least 3 years ago       -  Please do not hesitate to contact the team with any questions/concerns.     -  Please notify inpatient diabetes service if changes are planned that will impact glycemia, such as NPO, starting/adjusting steroids, enteral feeding, parenteral feeding, dextrose fluids or procedures.      Management of Diabetes in pregnancy:    BG targets  Less than 95 fasting  Less than 140 one hour post-meal  Less than 120 two hours post-meal  Goal of 0 episodes of blood sugars less than 60       Plan discussed with patient in person, bedside RN in person and primary team via this note.         Interval History:     The last 24 hours progress and nursing notes reviewed.      Noting more excursions with 2 hr PP BG checks.   Dario aware IDS will be watching today and increasing PRN if > target.   Bumped up her cho coverage again to 7.5 g cho with meals.  No new issues or concerns.    BG trend:                  Planned Procedures/surgeries: none  D5W-containing solutions/medications: none    Recent Labs   Lab 23  0201  02/01/23  2332 02/01/23  2044 02/01/23  1817 02/01/23  1552 02/01/23  1129   GLC 93 132* 95 171* 80 134*         Nutrition:     Orders Placed This Encounter      Regular Diet Adult        PTA Regimen:     Medications:   Levemir 16 unit(s) once daily   BG monitoring frequency:  2-4 times per day   BG trends at home: 70's - 150's            Review of Systems:   CC: denies all          Medications:   Steroid planning:  BMZ course completed   Tube Feeding: no       Physical Exam:   /53 (BP Location: Right arm, Patient Position: Semi-Cardenas's, Cuff Size: Adult Regular)   Pulse 67   Temp 97.9  F (36.6  C) (Oral)   Resp 16   Wt 68.9 kg (151 lb 14.4 oz)   LMP 07/16/2022   BMI 27.78 kg/m       General:  pleasant, in no acute distress.  HEENT:  Hearing at level of conversation   Lungs:  unremarkable, no new cough, no SOB  Mental Status:  Alert and oriented x3  Psych:   Cooperative, full/appropriate affect         Data:     Lab Results   Component Value Date    A1C 6.7 10/11/2022    A1C 6.4 05/18/2022    A1C 5.8 03/19/2012        ROUTINE IP LABS (Last four results)  BMP  Recent Labs   Lab 02/02/23  0201 02/01/23  2332 02/01/23  2044 02/01/23  1817   GLC 93 132* 95 171*     CBC RESULTS: Recent Labs   Lab Test 01/26/23  1749   WBC 9.8   RBC 4.21   HGB 11.6*   HCT 35.4   MCV 84   MCH 27.6   MCHC 32.8   RDW 13.0        Recent Labs   Lab Test 02/02/23  0849 02/02/23  0201 01/18/23  1331 12/05/22  1101   GLC 80 93   < >  --    CR  --   --   --  0.50*    < > = values in this interval not displayed.       MAGALI Scott CNP   Inpatient Diabetes Management Service  Pager - [phone]  Available on [employer] - when on duty.     To contact Endocrine Diabetes service:   From 8AM-4PM: page inpatient diabetes provider who is following the patient that day (see filed or incomplete progress notes/consult notes).  If uncertain of provider assignment: page job code [id number]. (To page job code in-house dial 3 stars, [phone] then  enter number).  For questions or updates AFTER HOURS from 4PM-8AM: page the diabetes job code for on call fellow: [phone]    Please notify inpatient diabetes service if changes are planned to steroids, nutrition, or if procedures are planned requiring prolonged NPO status.Diabetes Management Team job code: [id number]    I spent a total of 15 minutes on the date of the encounter doing prep/post-work, chart review, history and exam, documentation and further activities per the note including lab review, multidisciplinary communication, counseling the patient and/or coordinating care regarding acute hyper/hypoglycemic management.  See note for details.

## 2023-02-02 NOTE — PROGRESS NOTES
SPIRITUAL HEALTH SERVICES Progress Note  [facility] ([facility]) 4BOB    Met with patient who was writing out a list for baby items when I stopped by. I introduced myself and oriented her to [facility]. She has shared that she is keeping well and that she has a long stay ahead of her. She stays preoccupied making lists and keeping busy. She is open to follow-up visits and is aware how to reach out to [facility] for any emergent needs/requests.    Yessi Ron  Chaplain Resident  Pager [phone]    * [facility] remains available 24/7 for emergent requests/referrals, either by having the switchboard page the on-call  or by entering an ASAP/STAT consult in Epic (this will also page the on-call ). Routine Epic consults receive an initial response within 24 hours.*     Alcohol withdrawal syndrome without complication Alcohol withdrawal syndrome without complication

## 2023-02-02 NOTE — PROGRESS NOTES
"   02/02/23 1616   Child Life   Location   (Antepartum)   Intervention Family Support;Sibling Support;Initial Assessment   Family Support Comment CCLS introduced self and services to patient at bedside. Patient is familiar with antepartum unit from experience with her (now) 7y son; making appropriate connections to her (now) 10y son's age-appropriate response to hospitalization then versus this pregnancy.     Patient went on to verbally process this admission and overall family coping; CCLS provided validation of feelings, supportive listening and rapport-building conversation. Patient notes appropriate social support at this time and highlights her intentional decisions to create a routine for herself within the hospital and engage in leisure and recreational activities for distraction to support her positive coping.     Family has remained in contact via Face Time and patient engaged is nightly routines with her children, virtually; working on homework, etc.    Sibling Support Comment 10y, \"Edilberto\" and 7y \"Tk\". Per mother, are familiar with her hospital room as they have visited last week per unit leadership approval. Patient shares that sons are asking many questions related to hospitalization and separation during hospitalization. CCLS to provide resources to increase understanding and connection; medical play and \"In the Hospital\" book to increase cognitive understanding and ongoing processing of hospitalization, diversional activities to participate in during visits, and matching pillow cases to support connection during separation.   Outcomes/Follow Up Continue to Follow/Support  (CCLS to continue to follow/assess patient and family needs throughout hospitalization. ASCOM: *44501)       "

## 2023-02-03 LAB
GLUCOSE BLDC GLUCOMTR-MCNC: 102 MG/DL (ref 70–99)
GLUCOSE BLDC GLUCOMTR-MCNC: 106 MG/DL (ref 70–99)
GLUCOSE BLDC GLUCOMTR-MCNC: 136 MG/DL (ref 70–99)
GLUCOSE BLDC GLUCOMTR-MCNC: 139 MG/DL (ref 70–99)
GLUCOSE BLDC GLUCOMTR-MCNC: 148 MG/DL (ref 70–99)
GLUCOSE BLDC GLUCOMTR-MCNC: 65 MG/DL (ref 70–99)
GLUCOSE BLDC GLUCOMTR-MCNC: 75 MG/DL (ref 70–99)
GLUCOSE BLDC GLUCOMTR-MCNC: 85 MG/DL (ref 70–99)
GLUCOSE BLDC GLUCOMTR-MCNC: 96 MG/DL (ref 70–99)

## 2023-02-03 PROCEDURE — 250N000013 HC RX MED GY IP 250 OP 250 PS 637: Performed by: STUDENT IN AN ORGANIZED HEALTH CARE EDUCATION/TRAINING PROGRAM

## 2023-02-03 PROCEDURE — 99231 SBSQ HOSP IP/OBS SF/LOW 25: CPT | Performed by: NURSE PRACTITIONER

## 2023-02-03 PROCEDURE — 250N000013 HC RX MED GY IP 250 OP 250 PS 637: Performed by: OBSTETRICS & GYNECOLOGY

## 2023-02-03 PROCEDURE — 120N000002 HC R&B MED SURG/OB UMMC

## 2023-02-03 PROCEDURE — 99231 SBSQ HOSP IP/OBS SF/LOW 25: CPT | Performed by: OBSTETRICS & GYNECOLOGY

## 2023-02-03 RX ADMIN — SENNOSIDES AND DOCUSATE SODIUM 1 TABLET: 8.6; 5 TABLET ORAL at 20:32

## 2023-02-03 RX ADMIN — ACYCLOVIR 400 MG: 400 TABLET ORAL at 08:08

## 2023-02-03 RX ADMIN — PRENATAL VITAMINS-IRON FUMARATE 27 MG IRON-FOLIC ACID 0.8 MG TABLET 1 TABLET: at 20:32

## 2023-02-03 RX ADMIN — ACYCLOVIR 400 MG: 400 TABLET ORAL at 14:39

## 2023-02-03 RX ADMIN — SENNOSIDES AND DOCUSATE SODIUM 2 TABLET: 50; 8.6 TABLET ORAL at 08:08

## 2023-02-03 RX ADMIN — ACYCLOVIR 400 MG: 400 TABLET ORAL at 20:32

## 2023-02-03 ASSESSMENT — ACTIVITIES OF DAILY LIVING (ADL)
ADLS_ACUITY_SCORE: 20

## 2023-02-03 NOTE — PROVIDER NOTIFICATION
02/03/23 0655   Provider Notification   Provider Name/Title Dr. Theresa Sylvester   Method of Notification Phone   Request Evaluate - Remote   Notification Reason Decels   Talked to Dr. Sylvester on the phone regarding a prolonged deceleration and a variable deceleration noted on FHRT.     VSS. Leaking scant amounts of clear, odorless fluid. Afebrile. No vaginal bleeding. Denies contractions or cramping. BG checks WNL, no insulin correction needed at bedtime. Fasting BG WNL at 85 mg/dL.     Will stay on continuous monitoring for now per Dr. Sylvester. She states there is a low threshold for cramping and shamir due to breech positioning of fetus.    Nurse report and handoff given to oncoming nurse at 0715.    Marianna Corona RN on 2/3/2023 at 7:32 AM

## 2023-02-03 NOTE — PROGRESS NOTES
Diabetes Consult Daily  Progress Note          Assessment/Plan:     HPI:  Dario Nathan is a 27 year old with a past medical hx of T2DM who is a  at 24w5d by 6w0d US. Admitted 2023  for leakage of fluid.    Assessment:     1)  Type 2 Diabetes Mellitus; suboptimal control.  A1c 6.7%  2)  Acute hyperglycemia exacerbated by steroids/pregnancy/stress   3)  Anemia         Plan:       -  Decrease Lantus 12 unit(s) daily (0800)    -  slight decrease Novolog meal coverage 1 unit(s) per 8 g cho from 7.5 g cho AC meals/snacks    -  Novolog Medium resistance sliding scale insulin TID AC >100 and HS >120     -  BG monitoring TID AC/HS/02 with 2 hr PP.      -  PTA meds:  N/A    -  Hypoglycemia protocol    -  Recommend carb counting protocol    -  Education :  TBD     -  Outpatient follow up:  recommend [facility] Endocrinology vs PCP    -  Recommend eye exam as soon as able - last exam at least 3 years ago       -  Please do not hesitate to contact the team with any questions/concerns.     -  Please notify inpatient diabetes service if changes are planned that will impact glycemia, such as NPO, starting/adjusting steroids, enteral feeding, parenteral feeding, dextrose fluids or procedures.      Management of Diabetes in pregnancy:    BG targets  Less than 95 fasting  Less than 140 one hour post-meal  Less than 120 two hours post-meal  Goal of 0 episodes of blood sugars less than 60       Plan discussed with patient in person, bedside RN in person and primary team via this note.         Interval History:     The last 24 hours progress and nursing notes reviewed.      No new issues or concerns.  Tighter control over this past interval, will slightly reduce insulins and closely monitor.      BG trend:  Stable         Page to primary team to discuss IDS signing off - would prefer we following peripherally which we certainly will do.     Dario is doing well.  No complaints.   All questions and  concerns addressed.     Planned Procedures/surgeries: none  D5W-containing solutions/medications: none    Recent Labs   Lab 02/03/23  0213 02/02/23  2245 02/02/23  1741 02/02/23  1406 02/02/23  0849 02/02/23  0201   GLC 85 93 107* 78 80 93         Nutrition:     Orders Placed This Encounter      Regular Diet Adult        PTA Regimen:     Medications:   Levemir 16 unit(s) once daily   BG monitoring frequency:  2-4 times per day   BG trends at home: 70's - 150's            Review of Systems:   CC: denies all          Medications:   Steroid planning:  BMZ course completed   Tube Feeding: no       Physical Exam:   /56 (BP Location: Right arm, Patient Position: Right side, Cuff Size: Adult Regular)   Pulse 67   Temp 97.9  F (36.6  C) (Oral)   Resp 18   Wt 68.9 kg (151 lb 14.4 oz)   LMP 07/16/2022   BMI 27.78 kg/m       General:  pleasant, in no acute distress.  HEENT:  Hearing at level of conversation   Lungs:  unremarkable, no new cough, no SOB  Mental Status:  Alert and oriented x3  Psych:   Cooperative, full/appropriate affect         Data:     Lab Results   Component Value Date    A1C 6.7 10/11/2022    A1C 6.4 05/18/2022    A1C 5.8 03/19/2012        ROUTINE IP LABS (Last four results)  BMP  Recent Labs   Lab 02/03/23  0213 02/02/23  2245 02/02/23  1741 02/02/23  1406   GLC 85 93 107* 78     CBC RESULTS: Recent Labs   Lab Test 01/26/23  1749   WBC 9.8   RBC 4.21   HGB 11.6*   HCT 35.4   MCV 84   MCH 27.6   MCHC 32.8   RDW 13.0        Recent Labs   Lab Test 02/03/23  1144 02/03/23  0930 01/18/23  1331 12/05/22  1101   * 75   < >  --    CR  --   --   --  0.50*    < > = values in this interval not displayed.       MAGALI Scott CNP   Inpatient Diabetes Management Service  Pager - [phone]  Available on [employer] [employer] - when on duty.     To contact Endocrine Diabetes service:   From 8AM-4PM: page inpatient diabetes provider who is following the patient that day (see filed or incomplete  progress notes/consult notes).  If uncertain of provider assignment: page job code [id number]. (To page job code in-house dial 3 stars, [phone] then enter number).  For questions or updates AFTER HOURS from 4PM-8AM: page the diabetes job code for on call fellow: [phone]    Please notify inpatient diabetes service if changes are planned to steroids, nutrition, or if procedures are planned requiring prolonged NPO status.Diabetes Management Team job code: [id number]    I spent a total of 25 minutes on the date of the encounter doing prep/post-work, chart review, history and exam, documentation and further activities per the note including lab review, multidisciplinary communication, counseling the patient and/or coordinating care regarding acute hyper/hypoglycemic management.  See note for details.

## 2023-02-03 NOTE — PROGRESS NOTES
Diabetes Consult Daily  Progress Note          Assessment/Plan:     HPI:  Dario Nathan is a 27 year old with a past medical hx of T2DM who is a  at 24w5d by 6w0d US. Admitted 2023  for leakage of fluid.    Assessment:   1)  Type 2 Diabetes Mellitus; suboptimal control.  A1c 6.7%  2)  Acute hyperglycemia exacerbated by steroids/pregnancy      Plan:     -  Lantus 12 units daily AM (0800)    -  Novolog 1:7g CHO with meals/snacks    -  Novolog moderate intensity sliding scale insulin TID AC >100 and HS >120     - tomorrow, anticipating reduction in Lantus and increase in CHO coverage.     -  BG monitoring TID AC/HS/02 with 2 hr PP.      -  Hypoglycemia protocol    -  Recommend carb counting protocol    -  Education :  TBD     -  Outpatient follow up:  recommend [facility] Endocrinology vs PCP        Management of Diabetes in pregnancy:  BG targets  Less than 95 fasting  Less than 140 one hour post-meal  Less than 120 two hours post-meal  Goal of 0 episodes of blood sugars less than 60       Plan discussed with patient, RN, primary team via this note.          Interval History:     BG trend reviewed.   Pre lunch hypoglycemia continues- usually reduce basal/background insulin in this instance- she got Lantus already today. She did go for a walk just before lunch.   Also having postprandial hyperglycemia.     She's averaging 35-60g CHO with meals.      Planned Procedures/surgeries: none  D5W-containing solutions/medications: none    Recent Labs   Lab 23  1433 23  1402 23  1044 23  0818 23  0158 23  2152   GLC 71 62* 165* 80 91 136*         Nutrition:     Orders Placed This Encounter      Regular Diet Adult        PTA Regimen:     Medications:   Levemir 16 unit(s) once daily   BG monitoring frequency:  2-4 times per day   BG trends at home: 70's - 150's            Review of Systems:   CC: denies all          Medications:   Steroid planning:  BMZ course  completed   Tube Feeding: no       Physical Exam:   /55 (Cuff Size: Adult Regular)   Pulse 67   Temp 97.8  F (36.6  C) (Oral)   Resp 16   Wt 68 kg (150 lb)   LMP 07/16/2022   BMI 27.44 kg/m       General:  pleasant, in no acute distress.  HEENT:  Hearing at level of conversation   Lungs:  unremarkable, no new cough, no SOB  Mental Status:  Alert and oriented x3  Psych:   Cooperative, full/appropriate affect         Data:     Lab Results   Component Value Date    A1C 6.7 10/11/2022    A1C 6.4 05/18/2022    A1C 5.8 03/19/2012        ROUTINE IP LABS (Last four results)  BMP  Recent Labs   Lab 02/04/23  1433 02/04/23  1402 02/04/23  1044 02/04/23  0818   GLC 71 62* 165* 80     CBC RESULTS: Recent Labs   Lab Test 01/26/23  1749   WBC 9.8   RBC 4.21   HGB 11.6*   HCT 35.4   MCV 84   MCH 27.6   MCHC 32.8   RDW 13.0        Recent Labs   Lab Test 02/03/23  1144 02/03/23  0930 01/18/23  1331 12/05/22  1101   * 75   < >  --    CR  --   --   --  0.50*    < > = values in this interval not displayed.       To contact Endocrine Diabetes service:   From 8AM-4PM: page inpatient diabetes provider who is following the patient that day (see filed or incomplete progress notes/consult notes).  If uncertain of provider assignment: page job code [id number]. (To page job code in-house dial 3 stars, [phone] then enter number).  For questions or updates AFTER HOURS from 4PM-8AM: page the diabetes job code for on call fellow: [phone]    Please notify inpatient diabetes service if changes are planned to steroids, nutrition, or if procedures are planned requiring prolonged NPO status.Diabetes Management Team job code: [id number]    I spent a total of 50 minutes on the date of the encounter doing prep/post-work, chart review, history and exam, documentation and further activities per the note including lab review, multidisciplinary communication, counseling the patient and/or coordinating care regarding acute hyper/hypoglycemic management.   See note for details.        Kristin Nguyen PA-C  Diabetes Management Service  Pager [phone]

## 2023-02-04 LAB
GLUCOSE BLDC GLUCOMTR-MCNC: 102 MG/DL (ref 70–99)
GLUCOSE BLDC GLUCOMTR-MCNC: 128 MG/DL (ref 70–99)
GLUCOSE BLDC GLUCOMTR-MCNC: 163 MG/DL (ref 70–99)
GLUCOSE BLDC GLUCOMTR-MCNC: 165 MG/DL (ref 70–99)
GLUCOSE BLDC GLUCOMTR-MCNC: 62 MG/DL (ref 70–99)
GLUCOSE BLDC GLUCOMTR-MCNC: 71 MG/DL (ref 70–99)
GLUCOSE BLDC GLUCOMTR-MCNC: 80 MG/DL (ref 70–99)
GLUCOSE BLDC GLUCOMTR-MCNC: 91 MG/DL (ref 70–99)
GLUCOSE BLDC GLUCOMTR-MCNC: 93 MG/DL (ref 70–99)
RUPTURE OF FETAL MEMBRANES BY ROM PLUS: POSITIVE

## 2023-02-04 PROCEDURE — 84112 EVAL AMNIOTIC FLUID PROTEIN: CPT | Performed by: STUDENT IN AN ORGANIZED HEALTH CARE EDUCATION/TRAINING PROGRAM

## 2023-02-04 PROCEDURE — 99233 SBSQ HOSP IP/OBS HIGH 50: CPT | Performed by: PHYSICIAN ASSISTANT

## 2023-02-04 PROCEDURE — 250N000013 HC RX MED GY IP 250 OP 250 PS 637: Performed by: STUDENT IN AN ORGANIZED HEALTH CARE EDUCATION/TRAINING PROGRAM

## 2023-02-04 PROCEDURE — 120N000002 HC R&B MED SURG/OB UMMC

## 2023-02-04 PROCEDURE — 250N000013 HC RX MED GY IP 250 OP 250 PS 637: Performed by: OBSTETRICS & GYNECOLOGY

## 2023-02-04 RX ORDER — POLYETHYLENE GLYCOL 3350 17 G/17G
17 POWDER, FOR SOLUTION ORAL DAILY
Status: DISCONTINUED | OUTPATIENT
Start: 2023-02-04 | End: 2023-02-14

## 2023-02-04 RX ADMIN — ACYCLOVIR 400 MG: 400 TABLET ORAL at 14:19

## 2023-02-04 RX ADMIN — ACYCLOVIR 400 MG: 400 TABLET ORAL at 08:13

## 2023-02-04 RX ADMIN — PRENATAL VITAMINS-IRON FUMARATE 27 MG IRON-FOLIC ACID 0.8 MG TABLET 1 TABLET: at 20:52

## 2023-02-04 RX ADMIN — SENNOSIDES AND DOCUSATE SODIUM 2 TABLET: 50; 8.6 TABLET ORAL at 08:13

## 2023-02-04 RX ADMIN — ACYCLOVIR 400 MG: 400 TABLET ORAL at 20:53

## 2023-02-04 RX ADMIN — SENNOSIDES AND DOCUSATE SODIUM 2 TABLET: 50; 8.6 TABLET ORAL at 20:52

## 2023-02-04 ASSESSMENT — ACTIVITIES OF DAILY LIVING (ADL)
ADLS_ACUITY_SCORE: 20

## 2023-02-04 NOTE — PROVIDER NOTIFICATION
02/04/23 0959   Provider Notification   Provider Name/Title MANSI Hernandez Kapur   Method of Notification Electronic Page   Request Evaluate - Remote   Notification Reason Other (Comment)  (medication request)     Pt reports having a bowel movement every couple of days and reports she feels slightly constipated. Writer encouraged pt to take two senna BID opposed to 2 in the AM and 1 in the PM. Order requested for PRN Miralax. Dr. ELPIDIO Gold aware.

## 2023-02-04 NOTE — PROGRESS NOTES
[facility] LABOR & DELIVERY PROGRESS NOTE:   2023   12:45 AM         SUBJECTIVE:   Patient c/o nothing.    Contractions:  q 0 minutes  Leakage of fluid:  Yes: clear  Vaginal bleeding:  No  Pain controlled:  Yes           OBJECTIVE:     Vitals:    23 1048 23 1435 23 1922 23 2351   BP: 95/53 94/53 107/57 97/54   BP Location: Right arm   Right arm   Patient Position: Sitting   Semi-Cardenas's   Cuff Size: Adult Regular Adult Regular  Adult Regular   Pulse:       Resp: 16 16  16   Temp: 98  F (36.7  C) 98.6  F (37  C) 98.1  F (36.7  C) 98.2  F (36.8  C)   TempSrc: Oral Oral Oral Oral   Weight:             NST:  Fetal Heart Rate Tracing:   Baseline: 140  Variability: Moderate  10 x 10 accels     Tocometer: No contractions    Abdomen:  Gravid, NT  Cervix:   Deferred         LABS:     Recent Results (from the past 12 hour(s))   Glucose by meter    Collection Time: 23  2:36 PM   Result Value Ref Range    GLUCOSE BY METER POCT 65 (L) 70 - 99 mg/dL   Glucose by meter    Collection Time: 23  3:00 PM   Result Value Ref Range    GLUCOSE BY METER POCT 96 70 - 99 mg/dL   Glucose by meter    Collection Time: 23  4:42 PM   Result Value Ref Range    GLUCOSE BY METER POCT 148 (H) 70 - 99 mg/dL   Glucose by meter    Collection Time: 23  6:11 PM   Result Value Ref Range    GLUCOSE BY METER POCT 102 (H) 70 - 99 mg/dL   Glucose by meter    Collection Time: 23  8:29 PM   Result Value Ref Range    GLUCOSE BY METER POCT 139 (H) 70 - 99 mg/dL   Glucose by meter    Collection Time: 23  9:52 PM   Result Value Ref Range    GLUCOSE BY METER POCT 136 (H) 70 - 99 mg/dL              ASSESSMENT/PLAN:   A/P: Dario Nathan is a 27 year old  female at 25w6d by 6w4d US, HD#9 admitted for PPROM. Pregnancy notable for T2DM and hx HSV.     PPROM  - Presumed diagnosis based on positive pooling and new oligohydramnios, however workup somewhat equivocal with negative ROM plus and  negative ferning. Continues to leak fluid however, consistent w/ diagnosis.  - Wet prep +clue cells, s/p flagyl, GC/CT neg, UA neg  - MFM US () EFW 41%, repeat  oligo. Fluid check US 2023 shows MVP 2.8cm. Plan for repeat fluid check weekly, scheduled  with growth measurements.  - s/p BMZ (-)  - s/p latency antibiotics   - S/p NICU consult  - Patient found to be breech on  ultrasound. Discussed with patient who expressed understanding. Emphasized importance of alerting the team if she feels anything change or something in her vagina.  Discussed risk of cord prolapse. s/p CCS consent      Type 2 DM  - Decreased to levemir 12u qAM, Novolog 1:12 cho, mSSI per endocrine  - s/p insulin gtt (-) during steroid course  - Endocrinology following closely, appreciate their management.      HSV  - Acyclovir ppx ord'd     PNC  - Rh pos, Rubella immune, GBS neg  - Tdap due, ord'd     FWB  Appropriate for gestational age   - TID monitoring   -  surveillance beginning 28 weeks  - S/p BMZ -  - Magnesium if moving toward deliv <32 weeks  - NICU for delivery  - Next growth ultrasound      Delivery Plan: expectant management to 34 weeks unless delivery clinically indicated sooner    So Jimenez MD

## 2023-02-04 NOTE — PROGRESS NOTES
Diabetes Consult Daily  Progress Note          Assessment/Plan:     HPI:  Dario Nathan is a 27 year old with a past medical hx of T2DM who is a  at 24w5d by 6w0d US. Admitted 2023  for leakage of fluid.    Assessment:   1)  Type 2 Diabetes Mellitus; suboptimal control.  A1c 6.7%  2)  Acute hyperglycemia exacerbated by steroids- completed BMZ course -23    BG targets in pregnancy Less than 95 fasting, Less than 140 one hour post-meal, Less than 120 two hours post-meal, 0  blood sugars less than 60       Plan:     -  Lantus reduced from 12 to 10 units daily AM (0800)    -  Novolog with meals adjusted to 1:7g CHO with breakfast, and 1:6.5g CHO with lunch, dinner, and 1:7g CHO with snacks.     -  Novolog moderate intensity sliding scale insulin TID AC >100 and HS >120 - added 0200 correction     -  BG monitoring TID AC/HS/0200, with 2 hr PP.      -  Hypoglycemia protocol    -  Recommend carb counting protocol    -  Education :  TBD     -  Outpatient follow up:  recommend [facility] Endocrinology vs PCP     Plan discussed with patient, RN, primary team via this note.          Interval History:     BG trend reviewed.   Having in between meal BG in 60's- gentle reduction in Lantus and added 0200 correction dose, so as not to move fasting BG out of target.   Having postprandial hyperglycemia- lunch yesterday was dosed at 1 unit per 7.5g due to the pre meal hypoglycemia.   With 1:7g CHO dose given at dinner (rounded up from 6.5 to 7 units, so was essentially dosed at a 1:6.5g ratio)- PP BG in target.     She's averaging 35-60g CHO with meals most of the time. But lunch today was >100g.    Expectant management until 34 weeks, unless delivery clinically indicated sooner. Baby is breech.       Planned Procedures/surgeries: none  D5W-containing solutions/medications: none    Recent Labs   Lab 23  1451 23  1208 23  0947 23  0230 23  2206 23  1942    GLC 74 121* 92 115* 102* 93         Nutrition:     Orders Placed This Encounter      Regular Diet Adult        PTA Regimen:     Medications:   Levemir 16 unit(s) once daily   BG monitoring frequency:  2-4 times per day   BG trends at home: 70's - 150's            Review of Systems:   CC: denies all          Medications:   Steroid planning:  BMZ course completed   Tube Feeding: no       Physical Exam:   BP 94/49   Pulse 67   Temp 97.9  F (36.6  C) (Oral)   Resp 16   Wt 68 kg (150 lb)   LMP 07/16/2022   BMI 27.44 kg/m       General:  pleasant, in no acute distress.  HEENT:  Hearing at level of conversation   Lungs:  No new cough, no SOB  Mental Status:  Alert and oriented x3  Psych:   Cooperative, full/appropriate affect         Data:     Lab Results   Component Value Date    A1C 6.7 10/11/2022    A1C 6.4 05/18/2022    A1C 5.8 03/19/2012        BMP  Recent Labs   Lab 02/05/23  1451 02/05/23  1208 02/05/23  0947 02/05/23  0230   GLC 74 121* 92 115*     CBC RESULTS: Recent Labs   Lab Test 01/26/23  1749   WBC 9.8   RBC 4.21   HGB 11.6*   HCT 35.4   MCV 84   MCH 27.6   MCHC 32.8   RDW 13.0        Recent Labs   Lab Test 02/03/23  1144 02/03/23  0930 01/18/23  1331 12/05/22  1101   * 75   < >  --    CR  --   --   --  0.50*    < > = values in this interval not displayed.       To contact Endocrine Diabetes service:   From 8AM-4PM: page inpatient diabetes provider who is following the patient that day (see filed or incomplete progress notes/consult notes).  If uncertain of provider assignment: page job code [id number]. (To page job code in-house dial 3 stars, [phone] then enter number).  For questions or updates AFTER HOURS from 4PM-8AM: page the diabetes job code for on call fellow: [phone]    Please notify inpatient diabetes service if changes are planned to steroids, nutrition, or if procedures are planned requiring prolonged NPO status.Diabetes Management Team job code: [id number]    I spent a total of 50 minutes on  the date of the encounter doing prep/post-work, chart review, history and exam, documentation and further activities per the note including lab review, multidisciplinary communication, counseling the patient and/or coordinating care regarding acute hyper/hypoglycemic management.  See note for details.        Kristin Nguyen PA-C  Diabetes Management Service  Pager [phone]

## 2023-02-05 LAB
GLUCOSE BLDC GLUCOMTR-MCNC: 106 MG/DL (ref 70–99)
GLUCOSE BLDC GLUCOMTR-MCNC: 115 MG/DL (ref 70–99)
GLUCOSE BLDC GLUCOMTR-MCNC: 121 MG/DL (ref 70–99)
GLUCOSE BLDC GLUCOMTR-MCNC: 146 MG/DL (ref 70–99)
GLUCOSE BLDC GLUCOMTR-MCNC: 74 MG/DL (ref 70–99)
GLUCOSE BLDC GLUCOMTR-MCNC: 92 MG/DL (ref 70–99)
GLUCOSE BLDC GLUCOMTR-MCNC: 99 MG/DL (ref 70–99)

## 2023-02-05 PROCEDURE — 120N000002 HC R&B MED SURG/OB UMMC

## 2023-02-05 PROCEDURE — 250N000013 HC RX MED GY IP 250 OP 250 PS 637: Performed by: OBSTETRICS & GYNECOLOGY

## 2023-02-05 PROCEDURE — 250N000013 HC RX MED GY IP 250 OP 250 PS 637: Performed by: STUDENT IN AN ORGANIZED HEALTH CARE EDUCATION/TRAINING PROGRAM

## 2023-02-05 PROCEDURE — 99231 SBSQ HOSP IP/OBS SF/LOW 25: CPT | Mod: GC | Performed by: OBSTETRICS & GYNECOLOGY

## 2023-02-05 RX ADMIN — PRENATAL VITAMINS-IRON FUMARATE 27 MG IRON-FOLIC ACID 0.8 MG TABLET 1 TABLET: at 20:10

## 2023-02-05 RX ADMIN — ACYCLOVIR 400 MG: 400 TABLET ORAL at 08:42

## 2023-02-05 RX ADMIN — ACYCLOVIR 400 MG: 400 TABLET ORAL at 14:05

## 2023-02-05 RX ADMIN — ACYCLOVIR 400 MG: 400 TABLET ORAL at 20:10

## 2023-02-05 RX ADMIN — SENNOSIDES AND DOCUSATE SODIUM 2 TABLET: 50; 8.6 TABLET ORAL at 08:42

## 2023-02-05 RX ADMIN — POLYETHYLENE GLYCOL 3350 17 G: 17 POWDER, FOR SOLUTION ORAL at 08:41

## 2023-02-05 ASSESSMENT — ACTIVITIES OF DAILY LIVING (ADL)
ADLS_ACUITY_SCORE: 20

## 2023-02-05 NOTE — PROVIDER NOTIFICATION
02/04/23 1400 02/04/23 1430 02/04/23 1450   Point of Care Testing   Puncture Site fingertip fingertip fingertip   Bedside Glucose (mg/dl )  62 mg/dl  (premeal BG) 71 mg/dl  (recheck) 128 mg/dl  (recheck)     Premeal BG 62. Patient encouraged to start eating right after BG was checked. Endocrine on unit and informed. Rechecks trended upward with final recheck at 128. Patient asymptomatic.

## 2023-02-05 NOTE — PROGRESS NOTES
[facility] LABOR & DELIVERY PROGRESS NOTE:   February 4, 2023   0900         SUBJECTIVE:   Patient doing well this morning, states that she continues to have some fluid on her pad (soaking about half a pad throughout the day) and she describes the fluid leaking as clear to light yellow. She had PPROM with a prior pregnancy and reports that this feels different and she isn't having as much leaking of fluid as she did before.          OBJECTIVE:     Vitals:    02/04/23 0655 02/04/23 1046 02/04/23 1443 02/04/23 1852   BP: 97/51 101/55 94/49 103/59   BP Location: Right arm   Left arm   Patient Position: Semi-Cardenas's   Sitting   Cuff Size: Adult Regular Adult Regular     Pulse:       Resp: 17 16 16 18   Temp: 98.8  F (37.1  C) 97.8  F (36.6  C) 97.9  F (36.6  C) 98.6  F (37  C)   TempSrc: Oral Oral Oral Oral   Weight:             NST:  Fetal Heart Rate Tracing:   Baseline: 145  Variability: Moderate  Isolated spontanous decel, appropraite for GA   Tocometer: No contractions    General: Patient alert and oriented, no acute distress  CV: no peripheral edema or cyanosis  Resp: normal respiratory effort and equal lung expansion  Abdomen: gravid, NT   : Normal external genitalia and vaginal mucosa. No pooling noted in the vaginal vault and no passage of fluid with valsalva   Ext: non-tender, no edema           LABS:     Recent Results (from the past 12 hour(s))   Glucose by meter    Collection Time: 02/04/23  8:18 AM   Result Value Ref Range    GLUCOSE BY METER POCT 80 70 - 99 mg/dL   Glucose by meter    Collection Time: 02/04/23 10:44 AM   Result Value Ref Range    GLUCOSE BY METER POCT 165 (H) 70 - 99 mg/dL   Rupture of Fetal Membranes by ROM Plus    Collection Time: 02/04/23 12:31 PM   Result Value Ref Range    Rupture of Fetal Membranes by ROM Plus Positive (A) Negative, Invalid, Suggest Repeat   Glucose by meter    Collection Time: 02/04/23  2:02 PM   Result Value Ref Range    GLUCOSE BY METER POCT 62 (L) 70 - 99  mg/dL   Glucose by meter    Collection Time: 23  2:33 PM   Result Value Ref Range    GLUCOSE BY METER POCT 71 70 - 99 mg/dL   Glucose by meter    Collection Time: 23  2:52 PM   Result Value Ref Range    GLUCOSE BY METER POCT 128 (H) 70 - 99 mg/dL   Glucose by meter    Collection Time: 23  4:31 PM   Result Value Ref Range    GLUCOSE BY METER POCT 163 (H) 70 - 99 mg/dL              ASSESSMENT/PLAN:   A/P: Dario Nathan is a 27 year old  female at 26w0d by 6w4d US, HD#10 admitted for PPROM. Pregnancy notable for T2DM and hx HSV.     PPROM  - Presumed diagnosis based on positive pooling and new oligohydramnios, however workup somewhat equivocal with negative ROM plus and negative ferning. Eval repeated today and no pooling noted, ROM plus positive but ferning unable to be completed due to lack of material on slide. Discussed with patient that given her clinical presentation, she is very likely ruptured and would recommend continued inpatient management but that we will continue to monitor for ongoing leaking and can repeat another exam in the future if needed.   - Wet prep +clue cells, s/p flagyl, GC/CT neg, UA neg  - MFM US () EFW 41%, repeat  oligo. Fluid check US 2023 shows MVP 2.8cm. Plan for repeat fluid check weekly, scheduled  with growth measurements.  - s/p BMZ (-)  - s/p latency antibiotics   - S/p NICU consult  - Patient found to be breech on  ultrasound. Discussed with patient who expressed understanding. Emphasized importance of alerting the team if she feels anything change or something in her vagina.  Discussed risk of cord prolapse. s/p CCS consent      Type 2 DM  - Decreased to levemir 12u qAM, Novolog 1:7 cho, mSSI per endocrine  - s/p insulin gtt (-) during steroid course  - Endocrinology following closely, appreciate their management.      HSV  - Acyclovir ppx ord'd     PNC  - Rh pos, Rubella immune, GBS neg  - Tdap due, ordered       FWB  Appropriate for gestational age   - TID monitoring   -  surveillance beginning 28 weeks  - S/p BMZ -  - Magnesium if moving toward deliv <32 weeks  - NICU for delivery  - Next growth ultrasound      Delivery Plan: expectant management to 34 weeks unless delivery clinically indicated sooner    Anna Maki MD

## 2023-02-05 NOTE — PROGRESS NOTES
[facility] MD LABOR & DELIVERY PROGRESS NOTE:          SUBJECTIVE:   Patient doing well this morning, states that she continues to leak clear fluid. Denies contractions, bleeding.  Normal fetal movement. Denies abdominal pain, nausea, vomiting.          OBJECTIVE:     Vitals:    23 2205 23 0230 23 0630 23 0730   BP: 94/52 90/51 99/54    BP Location: Right arm Right arm Right arm    Patient Position: Semi-Cardenas's Semi-Cardenas's Semi-Cardenas's    Cuff Size: Adult Regular Adult Regular Adult Regular    Pulse:       Resp: 16 16 16    Temp: 98.7  F (37.1  C) 98.6  F (37  C)     TempSrc: Oral Oral Oral    Weight:    68.8 kg (151 lb 9.6 oz)       NST:  Fetal Heart Rate Tracing:   Baseline: 140  Variability: Moderate  No decels  Tocometer: No contractions    General: Patient alert and oriented, no acute distress  CV: no peripheral edema or cyanosis  Resp: normal respiratory effort and equal lung expansion  Abdomen: gravid, no fundal tenderness  : Normal external genitalia and vaginal mucosa. No pooling noted in the vaginal vault and no passage of fluid with valsalva   Ext: non-tender, no edema           LABS:     Recent Results (from the past 12 hour(s))   Glucose by meter    Collection Time: 23  2:30 AM   Result Value Ref Range    GLUCOSE BY METER POCT 115 (H) 70 - 99 mg/dL   Glucose by meter    Collection Time: 23  9:47 AM   Result Value Ref Range    GLUCOSE BY METER POCT 92 70 - 99 mg/dL              ASSESSMENT/PLAN:   A/P: Dario Nathan is a 27 year old  female at 26w1d by 6w4d US, HD#11 admitted for PPROM. Pregnancy notable for T2DM and hx HSV.     PPROM  - Positive pooling and new oligohydramnios with positive ROM+. Discussed with patient that given her clinical presentation, she is very likely ruptured and would recommend continued inpatient management.  - Wet prep +clue cells, s/p flagyl, GC/CT neg, UA neg  - MFM US () EFW 41%, repeat  oligo. Fluid check US 2023  shows MVP 2.8cm. Plan for repeat fluid check weekly, scheduled  with growth measurements.  - s/p BMZ (-)  - s/p latency antibiotics   - S/p NICU consult  - Patient found to be breech on  ultrasound. Discussed with patient who expressed understanding. Emphasized importance of alerting the team if she feels anything change or something in her vagina.  Discussed risk of cord prolapse. s/p CCS consent.  - Stable today, no fundal tenderness, continues to leak fluid.      Type 2 DM  - Decreased to levemir 12u qAM, Novolog 1:7 cho, mSSI per endocrine  - s/p insulin gtt (-) during steroid course  - Endocrinology following closely, appreciate their management.      HSV  - Acyclovir ppx ord'd     PNC  - Rh pos, Rubella immune, GBS neg  - Tdap due, ordered      FWB  Appropriate for gestational age   - TID monitoring   -  surveillance beginning 28 weeks  - S/p BMZ -  - Magnesium if moving toward deliv <32 weeks  - NICU for delivery  - Next growth ultrasound      Delivery Plan: expectant management to 34 weeks unless delivery clinically indicated sooner    John Whiteside MD  Obstetrics, Gynecology, and Women's Health  PGY3  11:08 AM 2023      Physician Attestation   I personally examined and evaluated this patient.  I discussed the patient with the resident/fellow and care team, and agree with the assessment and plan of care as documented in the note.     Key findings:  Doing well.   Leaking is stable.   No contractions, fever, foul drainage.   Category 1 tracing.   Continue inpatient management       So Jimenez MD  Date of Service (when I saw the patient): 23

## 2023-02-06 LAB
GLUCOSE BLDC GLUCOMTR-MCNC: 106 MG/DL (ref 70–99)
GLUCOSE BLDC GLUCOMTR-MCNC: 131 MG/DL (ref 70–99)
GLUCOSE BLDC GLUCOMTR-MCNC: 79 MG/DL (ref 70–99)
GLUCOSE BLDC GLUCOMTR-MCNC: 84 MG/DL (ref 70–99)
GLUCOSE BLDC GLUCOMTR-MCNC: 88 MG/DL (ref 70–99)
GLUCOSE BLDC GLUCOMTR-MCNC: 99 MG/DL (ref 70–99)

## 2023-02-06 PROCEDURE — 120N000002 HC R&B MED SURG/OB UMMC

## 2023-02-06 PROCEDURE — 99232 SBSQ HOSP IP/OBS MODERATE 35: CPT | Performed by: NURSE PRACTITIONER

## 2023-02-06 PROCEDURE — 250N000013 HC RX MED GY IP 250 OP 250 PS 637: Performed by: STUDENT IN AN ORGANIZED HEALTH CARE EDUCATION/TRAINING PROGRAM

## 2023-02-06 PROCEDURE — 250N000013 HC RX MED GY IP 250 OP 250 PS 637: Performed by: OBSTETRICS & GYNECOLOGY

## 2023-02-06 RX ADMIN — PRENATAL VITAMINS-IRON FUMARATE 27 MG IRON-FOLIC ACID 0.8 MG TABLET 1 TABLET: at 20:31

## 2023-02-06 RX ADMIN — POLYETHYLENE GLYCOL 3350 17 G: 17 POWDER, FOR SOLUTION ORAL at 08:34

## 2023-02-06 RX ADMIN — SENNOSIDES AND DOCUSATE SODIUM 2 TABLET: 50; 8.6 TABLET ORAL at 08:40

## 2023-02-06 RX ADMIN — SENNOSIDES AND DOCUSATE SODIUM 1 TABLET: 8.6; 5 TABLET ORAL at 20:30

## 2023-02-06 RX ADMIN — ACYCLOVIR 400 MG: 400 TABLET ORAL at 14:54

## 2023-02-06 RX ADMIN — ACYCLOVIR 400 MG: 400 TABLET ORAL at 20:30

## 2023-02-06 RX ADMIN — ACYCLOVIR 400 MG: 400 TABLET ORAL at 08:34

## 2023-02-06 ASSESSMENT — ACTIVITIES OF DAILY LIVING (ADL)
ADLS_ACUITY_SCORE: 20

## 2023-02-06 NOTE — PROGRESS NOTES
"CLINICAL NUTRITION SERVICES - ASSESSMENT NOTE     Nutrition Prescription    RECOMMENDATIONS FOR MDs/PROVIDERS TO ORDER:  None at this time.    Malnutrition Status:    Patient does not meet two of the established criteria necessary for diagnosing malnutrition.    Recommendations already ordered by Registered Dietitian (RD):  None at this time.    Future/Additional Recommendations:  RD to sign off at this time. RD may be consulted if need arises.     REASON FOR ASSESSMENT  Dario Nathan is a/an 27 year old female assessed by the dietitian for LOS.    PMH:   - Pregnancy complicated by T2DM, hx of PPROM  - LMP 07/16/2022       NUTRITION HISTORY  Spoke with the patient and she reports that her diet has changed since her pregnancy - she attributes the change in diet to her weight loss. Stating that she used to eat a pizza and potato chips, a whole burger and a side of fries or kettle corn, but now with counting carbs, she is more intentional about carb, pro and vegetable intake in balance.  She states that she has noticed the weight loss, but doesn't think it's muscle or water weight, mainly fat loss. Says she notices it in her face and around her collar bones.  She reports taking a prenatal vitamin at home.      CURRENT NUTRITION ORDERS  Diet: Regular  Intake/Tolerance:   Intake not regularly recorded. Health touch shows 2-3 meals ordered per day. Ordered 760-1,800 kcal 68-90 g pro per day. Does have outside food.    LABS  Recent Labs   Lab 02/06/23  0832 02/06/23  0246 02/05/23  2342 02/05/23  2102 02/05/23  1714 02/05/23  1451   GLC 84 88 106* 99 146* 74     Per endocrine note A1c 12/5/22: 4.9%, 10/11/22: 6.7%.     No other nutrition relevant labs since admission.    MEDICATIONS  Insulin Novolog  Lantus pen  Prenatal multivitamin w/ iron per tablet  Senna docusate    ANTHROPOMETRICS  Height: 157.5 cm (5'2\")  Most Recent Weight: 68.8 kg (151 lb 9.6 oz)    IBW: 50 kg  Pre-pregnancy BMI: Overweight BMI: 29 (72 kg is typical " BW)  BMI: Overweight BMI 25-29.9 (27.73)  Weight History:  Wt Readings from Last 10 Encounters:   02/05/23 68.8 kg (151 lb 9.6 oz)   01/13/23 71.8 kg (158 lb 3.2 oz)   12/30/22 71.2 kg (157 lb)   12/05/22 71.2 kg (157 lb)   11/10/22 71.7 kg (158 lb)   10/11/22 72.3 kg (159 lb 4.8 oz)   09/15/22 71.7 kg (158 lb)   05/18/22 71 kg (156 lb 8 oz)   10/08/19 72.4 kg (159 lb 9.6 oz)   10/17/18 71.7 kg (158 lb)   Wt loss since 1/13: 3 kg (4% BW)      Dosing Weight: 55 kg (ABW based on IBW and pre pregnancy weight)    ASSESSED NUTRITION NEEDS  Estimated Energy Needs: 1,717- 1,992 kcals/day ( 25 - 30 + 340 kcals/kg)  Justification: Increased needs, Pregnancy  Estimated Protein Needs: 71 grams protein/day (1.3 grams of pro/kg)  Justification: Maintenance, Pregnancy  Estimated Fluid Needs: 1,925 - 2,200 mL (35 -40 mL/kg)  Justification: Maintenance, Pregnancy    PHYSICAL FINDINGS  See malnutrition section below.  No abnormal nutrition-related physical findings observed.     MALNUTRITION  % Intake: No decreased intake noted  % Weight Loss: Weight loss does not meet criteria  Subcutaneous Fat Loss: None observed  Muscle Loss: None observed  Fluid Accumulation/Edema: None noted  Malnutrition Diagnosis: Patient does not meet two of the established criteria necessary for diagnosing malnutrition    NUTRITION DIAGNOSIS  No nutrition diagnosis at this time.      INTERVENTIONS  Implementation  Nutrition Education: No education needs assessed at this time     Goals  Patient to consume % of nutritionally adequate meal trays TID, or the equivalent with supplements/snacks.     Monitoring/Evaluation  RD to sign off at this time. RD may be consulted if need arises.    Tamika Child MS  Dietetic Intern

## 2023-02-06 NOTE — PROGRESS NOTES
[facility] MD ANTEPARTUM PROGRESS NOTE:           SUBJECTIVE:   Patient doing well this morning, states that she continues to leak clear fluid. Denies contractions, bleeding.  Normal fetal movement. Denies abdominal pain, nausea, vomiting. Mood is ok, had family by yesterday.          OBJECTIVE:      Vitals: /55 (BP Location: Right arm, Patient Position: Semi-Cardenas's, Cuff Size: Adult Regular)   Pulse 67   Temp 98  F (36.7  C) (Oral)   Resp 18   Wt 68.8 kg (151 lb 9.6 oz)   LMP 2022   SpO2 99%   BMI 27.73 kg/m        NST:  Fetal Heart Rate Tracing:   Baseline: 140  Variability: Moderate  No decels  Tocometer: No contractions     General: Patient alert and oriented, no acute distress  CV: no peripheral edema or cyanosis  Resp: normal respiratory effort and equal lung expansion  Abdomen: gravid, no fundal tenderness  Ext: non-tender, no edema             LABS:      BS: 84FBS, has been within range             ASSESSMENT/PLAN:   A/P: Dario Nathan is a 27 year old  female at 26w2d by 6w4d US, HD#12 admitted for PPROM. Pregnancy notable for T2DM and hx HSV.     PPROM  - Wet prep +clue cells, s/p flagyl, GC/CT neg, UA neg  - MFM US () EFW 41%, repeat  oligo. Fluid check US 2023 shows MVP 2.8cm. Plan for repeat fluid check weekly, scheduled  with growth measurements.  - s/p BMZ (-)  - s/p latency antibiotics   - S/p NICU consult  - Patient found to be breech on  ultrasound.   - Stable today, no fundal tenderness, continues to leak fluid.      Type 2 DM  - Decreased to levemir 12u qAM, Novolog 1:7 cho, mSSI per endocrine  - s/p insulin gtt (-) during steroid course  - Endocrinology following closely, appreciate their management.      HSV  - Acyclovir ppx ord'd     PNC  - Rh pos, Rubella immune, GBS neg  - Tdap due, ordered      FWB  Appropriate for gestational age   - TID monitoring   -  surveillance beginning 28 weeks  - S/p BMZ -  - Magnesium if moving  toward deliv <32 weeks  - NICU for delivery  - Next growth ultrasound 2/8     Delivery Plan: expectant management to 34 weeks unless delivery clinically indicated sooner    ALFONSO REDDY MD

## 2023-02-06 NOTE — CARE PLAN
Goal Outcome Evaluation:  Patient's VSS, continues to leak clear fluid, no bleeding, no signs or symptoms of infection. Patient denies any pain, states does feel a random cramp. Fetal heart rate tracing did have variables during PM monitoring, however cat I for 1 hour post variables. Patient AM monitoring  Cat I, appropriate for gestational age. Patient did not voice any concerns.

## 2023-02-06 NOTE — PROGRESS NOTES
Diabetes Consult Daily  Progress Note          Assessment/Plan:     HPI:  Dario Nathan is a 27 year old with a past medical hx of T2DM who is a  at 24w5d by 6w0d US. Admitted 2023  for leakage of fluid.    Assessment:   1)  Type 2 Diabetes Mellitus; suboptimal control.  A1c 6.7%  2)  Acute hyperglycemia exacerbated by steroids- completed BMZ course -23    BG targets in pregnancy Less than 95 fasting, Less than 140 one hour post-meal, Less than 120 two hours post-meal, 0  blood sugars less than 60       Plan:     -  Lantus 10 units daily AM (0800)    -  Novolog with meals 1:7g CHO with breakfast, and 1:6.5g CHO with lunch, dinner, and 1:7g CHO with snacks.     -  Novolog moderate intensity sliding scale insulin TID AC >100 and HS >120 - added 0200 correction     -  BG monitoring TID AC/HS/0200, with 2 hr PP.      -  Hypoglycemia protocol    -  Recommend carb counting protocol    -  Education :  TBD     -  Outpatient follow up:  recommend [facility] Endocrinology vs PCP     Plan discussed with patient, RN, primary team via this note.          Interval History:     BG trend reviewed. BG stable and largely reaching her targets.  Dealing with some constipation.   She's averaging 35-60g CHO with meals most of the time.     Expectant management until 34 weeks, unless delivery clinically indicated sooner. Baby is breech.       Planned Procedures/surgeries: none  D5W-containing solutions/medications: none    Recent Labs   Lab 23  0246 23  2342 23  2102 23  1714 23  1451 23  1208   GLC 88 106* 99 146* 74 121*         Nutrition:     Orders Placed This Encounter      Regular Diet Adult        PTA Regimen:     Medications:   Levemir 16 unit(s) once daily   BG monitoring frequency:  2-4 times per day   BG trends at home: 70's - 150's            Review of Systems:   CC: denies all          Medications:   Steroid planning:  BMZ course completed   Tube  Feeding: no       Physical Exam:   BP 98/51 (BP Location: Right arm, Patient Position: Semi-Cardenas's, Cuff Size: Adult Regular)   Pulse 67   Temp 98.8  F (37.1  C) (Oral)   Resp 16   Wt 68.8 kg (151 lb 9.6 oz)   LMP 07/16/2022   SpO2 98%   BMI 27.73 kg/m       General:  pleasant, in no acute distress.  HEENT:  Hearing at level of conversation   Lungs:  No new cough, no SOB  Mental Status:  Alert and oriented x3  Psych:   Cooperative, full/appropriate affect         Data:     Lab Results   Component Value Date    A1C 6.7 10/11/2022    A1C 6.4 05/18/2022    A1C 5.8 03/19/2012        BMP  Recent Labs   Lab 02/06/23  0246 02/05/23  2342 02/05/23  2102 02/05/23  1714   GLC 88 106* 99 146*     CBC RESULTS: Recent Labs   Lab Test 01/26/23  1749   WBC 9.8   RBC 4.21   HGB 11.6*   HCT 35.4   MCV 84   MCH 27.6   MCHC 32.8   RDW 13.0        Recent Labs   Lab Test 02/03/23  1144 02/03/23  0930 01/18/23  1331 12/05/22  1101   * 75   < >  --    CR  --   --   --  0.50*    < > = values in this interval not displayed.       To contact Endocrine Diabetes service:   From 8AM-4PM: page inpatient diabetes provider who is following the patient that day (see filed or incomplete progress notes/consult notes).  If uncertain of provider assignment: page job code [id number]. (To page job code in-house dial 3 stars, [phone] then enter number).  For questions or updates AFTER HOURS from 4PM-8AM: page the diabetes job code for on call fellow: [phone]    Please notify inpatient diabetes service if changes are planned to steroids, nutrition, or if procedures are planned requiring prolonged NPO status.Diabetes Management Team job code: [id number]    I spent a total of 35 minutes on the date of the encounter doing prep/post-work, chart review, history and exam, documentation and further activities per the note including lab review, multidisciplinary communication, counseling the patient and/or coordinating care regarding acute hyper/hypoglycemic  management.  See note for details.        MAGALI Gallegos, CNP  Diabetes Management Service  Pager [phone]

## 2023-02-07 LAB
ABO/RH(D): NORMAL
ANTIBODY SCREEN: NEGATIVE
GLUCOSE BLDC GLUCOMTR-MCNC: 110 MG/DL (ref 70–99)
GLUCOSE BLDC GLUCOMTR-MCNC: 114 MG/DL (ref 70–99)
GLUCOSE BLDC GLUCOMTR-MCNC: 80 MG/DL (ref 70–99)
GLUCOSE BLDC GLUCOMTR-MCNC: 81 MG/DL (ref 70–99)
GLUCOSE BLDC GLUCOMTR-MCNC: 89 MG/DL (ref 70–99)
GLUCOSE BLDC GLUCOMTR-MCNC: 96 MG/DL (ref 70–99)
HOLD SPECIMEN: NORMAL
SPECIMEN EXPIRATION DATE: NORMAL

## 2023-02-07 PROCEDURE — 86901 BLOOD TYPING SEROLOGIC RH(D): CPT | Performed by: STUDENT IN AN ORGANIZED HEALTH CARE EDUCATION/TRAINING PROGRAM

## 2023-02-07 PROCEDURE — 250N000013 HC RX MED GY IP 250 OP 250 PS 637: Performed by: OBSTETRICS & GYNECOLOGY

## 2023-02-07 PROCEDURE — 120N000002 HC R&B MED SURG/OB UMMC

## 2023-02-07 PROCEDURE — 250N000012 HC RX MED GY IP 250 OP 636 PS 637: Performed by: NURSE PRACTITIONER

## 2023-02-07 PROCEDURE — 99232 SBSQ HOSP IP/OBS MODERATE 35: CPT | Performed by: NURSE PRACTITIONER

## 2023-02-07 PROCEDURE — 36415 COLL VENOUS BLD VENIPUNCTURE: CPT | Performed by: STUDENT IN AN ORGANIZED HEALTH CARE EDUCATION/TRAINING PROGRAM

## 2023-02-07 PROCEDURE — 250N000013 HC RX MED GY IP 250 OP 250 PS 637: Performed by: STUDENT IN AN ORGANIZED HEALTH CARE EDUCATION/TRAINING PROGRAM

## 2023-02-07 PROCEDURE — 86850 RBC ANTIBODY SCREEN: CPT | Performed by: STUDENT IN AN ORGANIZED HEALTH CARE EDUCATION/TRAINING PROGRAM

## 2023-02-07 PROCEDURE — 99232 SBSQ HOSP IP/OBS MODERATE 35: CPT | Performed by: OBSTETRICS & GYNECOLOGY

## 2023-02-07 RX ORDER — BENZOCAINE/MENTHOL 6 MG-10 MG
LOZENGE MUCOUS MEMBRANE 2 TIMES DAILY PRN
Status: DISCONTINUED | OUTPATIENT
Start: 2023-02-07 | End: 2023-02-14

## 2023-02-07 RX ADMIN — ACYCLOVIR 400 MG: 400 TABLET ORAL at 09:10

## 2023-02-07 RX ADMIN — PRENATAL VITAMINS-IRON FUMARATE 27 MG IRON-FOLIC ACID 0.8 MG TABLET 1 TABLET: at 20:15

## 2023-02-07 RX ADMIN — SENNOSIDES AND DOCUSATE SODIUM 2 TABLET: 8.6; 5 TABLET ORAL at 09:10

## 2023-02-07 RX ADMIN — ACYCLOVIR 400 MG: 400 TABLET ORAL at 14:21

## 2023-02-07 RX ADMIN — INSULIN ASPART 1 UNITS: 100 INJECTION, SOLUTION INTRAVENOUS; SUBCUTANEOUS at 19:00

## 2023-02-07 RX ADMIN — HYDROCORTISONE: 1 CREAM TOPICAL at 14:22

## 2023-02-07 RX ADMIN — INSULIN ASPART 1 UNITS: 100 INJECTION, SOLUTION INTRAVENOUS; SUBCUTANEOUS at 14:57

## 2023-02-07 RX ADMIN — ACYCLOVIR 400 MG: 400 TABLET ORAL at 20:15

## 2023-02-07 RX ADMIN — SENNOSIDES AND DOCUSATE SODIUM 2 TABLET: 50; 8.6 TABLET ORAL at 20:15

## 2023-02-07 ASSESSMENT — ACTIVITIES OF DAILY LIVING (ADL)
ADLS_ACUITY_SCORE: 20

## 2023-02-07 NOTE — PROGRESS NOTES
[facility] ANTEPARTUM PROGRESS NOTE:   2023   10:42 AM          SUBJECTIVE:   Patient reports active fetal movement and hiccups. No contractions, no bleeding, no pain. Continues to leak clear fluid.  Rash on right hand is itchy         OBJECTIVE:     Vitals:    23 1230 23 1515 23 2353 23 0910   BP: 109/57 112/54 99/54 97/52   BP Location: Right arm Right arm     Patient Position: Semi-Cardenas's Semi-Cardenas's     Cuff Size: Adult Regular Adult Regular     Pulse:       Resp:    Temp: 97.9  F (36.6  C)  97.7  F (36.5  C) 98  F (36.7  C)   TempSrc: Oral  Oral Oral   SpO2: 98%      Weight:           NST:  Fetal Heart Rate Tracinbpm baseline, wanders between 140-150, moderate variability, possible occasional decel, no accels  Intermittent cat 2, overall appropriate for gestational age    Tocometer: No contractions    Gen: alert, oriented, no distress, very pleasant  Resp: normal respiratory effort  Abdomen:  Gravid, nontender  Extremities: warm, well perfused, no edema bilateral lower extremities, mildly erythematous maculopapular rash on dorsal surface of right hand        Recent Results (from the past 24 hour(s))   Glucose by meter    Collection Time: 23 11:23 AM   Result Value Ref Range    GLUCOSE BY METER POCT 131 (H) 70 - 99 mg/dL   Glucose by meter    Collection Time: 23 12:18 PM   Result Value Ref Range    GLUCOSE BY METER POCT 99 70 - 99 mg/dL   Glucose by meter    Collection Time: 23  2:54 PM   Result Value Ref Range    GLUCOSE BY METER POCT 106 (H) 70 - 99 mg/dL   Glucose by meter    Collection Time: 23  5:52 PM   Result Value Ref Range    GLUCOSE BY METER POCT 79 70 - 99 mg/dL   Glucose by meter    Collection Time: 23 11:55 PM   Result Value Ref Range    GLUCOSE BY METER POCT 89 70 - 99 mg/dL   Glucose by meter    Collection Time: 23  2:31 AM   Result Value Ref Range    GLUCOSE BY METER POCT 81 70 - 99 mg/dL   Adult Type  and Screen    Collection Time: 23  8:59 AM   Result Value Ref Range    ABO/RH(D) O POS     Antibody Screen Negative Negative    SPECIMEN EXPIRATION DATE [id number]    Extra Green Top (Lithium Heparin) Tube    Collection Time: 23  8:59 AM   Result Value Ref Range    Hold Specimen JIC    Glucose by meter    Collection Time: 23 10:11 AM   Result Value Ref Range    GLUCOSE BY METER POCT 80 70 - 99 mg/dL                ASSESSMENT/PLAN:    27 year old  26w3d by 6w4d US, HD#13 admitted for PPROM. Pregnancy notable for T2DM and hx HSV. Stable without any signs or symptoms of  labor, infection, abruption. Intermittent category 2 tracing due to isolated possible decels, overall appropriate for gestational age.      PPROM  - Wet prep +clue cells, s/p flagyl, GC/CT neg, UA neg  - MFM US () EFW 41%, repeat  oligo. Fluid check US 2023 shows MVP 2.8cm. Plan for repeat fluid check weekly, scheduled  with growth measurements.  - s/p BMZ (-)  - s/p latency antibiotics   - S/p NICU consult  - fetus was breech on  ultrasound.   - Stable today, no fundal tenderness, no contractions, no bleeding, no fever, continues to leak clear fluid.      Type 2 DM  - levemir discontinued, Novolog 1:7 cho, mSSI per endocrine  - s/p insulin gtt (-) during steroid course  - Endocrinology following closely, appreciate their management.      HSV  - Acyclovir 400mg PO TID for prophylaxis     PNC  - Rh pos, Rubella immune, GBS neg  - Tdap due, ordered      FWB  Appropriate for gestational age   - TID monitoring   -  surveillance beginning 28 weeks  - S/p BMZ -  - Magnesium if moving toward deliv <32 weeks  - NICU for delivery  - Next growth ultrasound     Rash on hand: topical hydrocortisone ordered     Delivery Plan: expectant management to 34 weeks unless delivery clinically indicated sooner     Erin Rivas MD

## 2023-02-07 NOTE — PROGRESS NOTES
ZE received additional order for ZE to see to address patient's questions about parking options and unemployment benefits.    ZE met with Dario this morning.  She believes she has short-term disability benefits through her employer but has not received any communication about this.  ZE instructed Dario to call her human resources/ to inquire about this.  ZE provided Dario with my email and phone number to give to her benefits person, if needed, so I can assist her with this.    ZE explained to Dario that I do not anticipate she would be eligible for unemployment benefits.  Unemployment benefits are for those that are out of work but able to work and actively seeking work.     ZE explained that application for county assistance- cash and food assistance may be something to consider if she does not have short-term disability benefits.     Provided Dario a one month parking pass.  Her  has been paying for parking since her admission and has exhausted their personal funds.      ZE will continue to follow for supportive interventions.    CONNIE Virgen [facility]  Clinical   Maternal Child Health  Phone:  [phone]  Pager:  [phone]

## 2023-02-07 NOTE — PROGRESS NOTES
Diabetes Consult Daily  Progress Note          Assessment/Plan:     HPI:  Dario Nathan is a 27 year old with a past medical hx of T2DM who is a  at 24w5d by 6w0d US. Admitted 2023  for leakage of fluid.    Assessment:   1)  Type 2 Diabetes Mellitus; suboptimal control.  A1c 6.7%  2)  Acute hyperglycemia exacerbated by steroids- completed BMZ course -23    BG targets in pregnancy Less than 95 fasting, Less than 140 one hour post-meal, Less than 120 two hours post-meal, 0  blood sugars less than 60       Plan:     -  Lantus 10 units daily AM (0800)000>decrease to 9 units this AM    -  Novolog with meals 1:7g CHO with breakfast, and 1:6.5g CHO with lunch, dinner, and 1:7g CHO with snacks.     -  Novolog moderate intensity sliding scale insulin TID AC >100 and HS >120 and 0200 correction     -  BG monitoring TID AC/HS/0200, with 2 hr PP.      -  Hypoglycemia protocol    -  Recommend carb counting protocol    -  Education :  TBD     -  Outpatient follow up:  recommend [facility] Endocrinology vs PCP     Plan discussed with patient, RN, primary team via this note.          Interval History:     BG trend reviewed.   BG tight- mostly in 80s overnight.  Will reduce Lantus slightly to 9 units from 10 units.  Patient eating well, denies nausea.  Dealing with some constipation.   She's averaging 35-60g CHO with meals most of the time. No other complaints.     Expectant management until 34 weeks, unless delivery clinically indicated sooner. Baby is breech.       Planned Procedures/surgeries: none  D5W-containing solutions/medications: none    Recent Labs   Lab 23  0231 23  2355 23  1752 23  1454 23  1218 23  1123   GLC 81 89 79 106* 99 131*         Nutrition:     Orders Placed This Encounter      Regular Diet Adult        PTA Regimen:     Medications:   Levemir 16 unit(s) once daily   BG monitoring frequency:  2-4 times per day   BG trends at home:  70's - 150's            Review of Systems:   CC: denies all          Medications:   Steroid planning:  BMZ course completed   Tube Feeding: no       Physical Exam:   BP 99/54   Pulse 67   Temp 97.7  F (36.5  C) (Oral)   Resp 16   Wt 68.8 kg (151 lb 9.6 oz)   LMP 07/16/2022   SpO2 98%   BMI 27.73 kg/m       General:  pleasant, in no acute distress.  HEENT:  Hearing at level of conversation   Lungs:  No new cough, no SOB  Mental Status:  Alert and oriented x3  Psych:   Cooperative, full/appropriate affect         Data:     Lab Results   Component Value Date    A1C 6.7 10/11/2022    A1C 6.4 05/18/2022    A1C 5.8 03/19/2012        BMP  Recent Labs   Lab 02/07/23  0231 02/06/23  2355 02/06/23  1752 02/06/23  1454   GLC 81 89 79 106*     CBC RESULTS: Recent Labs   Lab Test 01/26/23  1749   WBC 9.8   RBC 4.21   HGB 11.6*   HCT 35.4   MCV 84   MCH 27.6   MCHC 32.8   RDW 13.0        Recent Labs   Lab Test 02/03/23  1144 02/03/23  0930 01/18/23  1331 12/05/22  1101   * 75   < >  --    CR  --   --   --  0.50*    < > = values in this interval not displayed.       To contact Endocrine Diabetes service:   From 8AM-4PM: page inpatient diabetes provider who is following the patient that day (see filed or incomplete progress notes/consult notes).  If uncertain of provider assignment: page job code [id number]. (To page job code in-house dial 3 stars, 777 then enter number).  For questions or updates AFTER HOURS from 4PM-8AM: page the diabetes job code for on call fellow: [phone]    Please notify inpatient diabetes service if changes are planned to steroids, nutrition, or if procedures are planned requiring prolonged NPO status.Diabetes Management Team job code: [id number]    I spent a total of 35 minutes on the date of the encounter doing prep/post-work, chart review, history and exam, documentation and further activities per the note including lab review, multidisciplinary communication, counseling the patient and/or  coordinating care regarding acute hyper/hypoglycemic management.  See note for details.        MAGALI Gallegos, CNP  Diabetes Management Service  Pager [phone]

## 2023-02-07 NOTE — PROGRESS NOTES
Allegra Sylvester resident to review fetal tracing due to decelerations in fetal heart rate. Higinio stated that this is not uncommon for her and if she does not have another decel by 2350 patient can come off the monitoring.

## 2023-02-07 NOTE — PROGRESS NOTES
Patient awake in bed on the phone with family. Patient requesting to shower. Declines linen changes stating that they were changed today. Patient is also complaining of irritation on both hands and arms stating it is itchy then little bumps come up. I told her to mention it to the provider in the morning and if it became worse I would call the doctor.

## 2023-02-07 NOTE — SUMMARY OF CARE
Patient was stable throughout the shift. Reported minimal leaking, denies signs/symptoms of  labor including cramping or pressure. Patient received scheduled medications on shift see MAR. Patients linens changed. Pt now resting in room. Report given to TACOS Ca RN.

## 2023-02-08 ENCOUNTER — APPOINTMENT (OUTPATIENT)
Dept: ULTRASOUND IMAGING | Facility: CLINIC | Age: 28
End: 2023-02-08
Payer: COMMERCIAL

## 2023-02-08 LAB
GLUCOSE BLDC GLUCOMTR-MCNC: 127 MG/DL (ref 70–99)
GLUCOSE BLDC GLUCOMTR-MCNC: 155 MG/DL (ref 70–99)
GLUCOSE BLDC GLUCOMTR-MCNC: 71 MG/DL (ref 70–99)
GLUCOSE BLDC GLUCOMTR-MCNC: 93 MG/DL (ref 70–99)

## 2023-02-08 PROCEDURE — 120N000002 HC R&B MED SURG/OB UMMC

## 2023-02-08 PROCEDURE — 99231 SBSQ HOSP IP/OBS SF/LOW 25: CPT | Mod: 25 | Performed by: OBSTETRICS & GYNECOLOGY

## 2023-02-08 PROCEDURE — 76816 OB US FOLLOW-UP PER FETUS: CPT

## 2023-02-08 PROCEDURE — 76816 OB US FOLLOW-UP PER FETUS: CPT | Mod: 26 | Performed by: STUDENT IN AN ORGANIZED HEALTH CARE EDUCATION/TRAINING PROGRAM

## 2023-02-08 PROCEDURE — 250N000013 HC RX MED GY IP 250 OP 250 PS 637: Performed by: STUDENT IN AN ORGANIZED HEALTH CARE EDUCATION/TRAINING PROGRAM

## 2023-02-08 PROCEDURE — 99232 SBSQ HOSP IP/OBS MODERATE 35: CPT | Performed by: NURSE PRACTITIONER

## 2023-02-08 PROCEDURE — 59025 FETAL NON-STRESS TEST: CPT | Mod: 26 | Performed by: OBSTETRICS & GYNECOLOGY

## 2023-02-08 PROCEDURE — 250N000013 HC RX MED GY IP 250 OP 250 PS 637: Performed by: OBSTETRICS & GYNECOLOGY

## 2023-02-08 RX ADMIN — PRENATAL VITAMINS-IRON FUMARATE 27 MG IRON-FOLIC ACID 0.8 MG TABLET 1 TABLET: at 20:24

## 2023-02-08 RX ADMIN — INSULIN ASPART 1 UNITS: 100 INJECTION, SOLUTION INTRAVENOUS; SUBCUTANEOUS at 19:16

## 2023-02-08 RX ADMIN — ACYCLOVIR 400 MG: 400 TABLET ORAL at 14:52

## 2023-02-08 RX ADMIN — ACYCLOVIR 400 MG: 400 TABLET ORAL at 07:37

## 2023-02-08 RX ADMIN — SENNOSIDES AND DOCUSATE SODIUM 2 TABLET: 50; 8.6 TABLET ORAL at 07:37

## 2023-02-08 RX ADMIN — SENNOSIDES AND DOCUSATE SODIUM 2 TABLET: 50; 8.6 TABLET ORAL at 20:24

## 2023-02-08 RX ADMIN — ACYCLOVIR 400 MG: 400 TABLET ORAL at 20:24

## 2023-02-08 ASSESSMENT — ACTIVITIES OF DAILY LIVING (ADL)
ADLS_ACUITY_SCORE: 20

## 2023-02-08 NOTE — PROGRESS NOTES
Antepartum Rounds      NAME:   Dario Nathan  MR #:     [id number]  DATE:  February 8, 2023       SUBJECTIVE:   Feeling well, no complaints.  Denies bleeding.  Continues to notice LOF. Also notes mild contractions, nothing that is regular or that hurts.  Feels normal FM.    OBJECTIVE:  Vitals:    02/07/23 1629 02/07/23 1807 02/07/23 2105 02/08/23 0741   BP: (!) 86/48 102/55 112/57 107/51   BP Location: Right arm Right arm Right arm Right arm   Patient Position: Other (comments) Sitting Semi-Cardenas's Semi-Cardenas's   Cuff Size: Adult Regular Adult Regular  Adult Regular   Pulse: 65   69   Resp: 16  16 16   Temp:   97.9  F (36.6  C) 98.1  F (36.7  C)   TempSrc:   Oral Oral   SpO2:       Weight:          TOCO: no contractions  EFM:   150 baseline, normal variablility, + accels, no decels, Category I appropriate for GA   NST is reassuring for 26w4d   Gen:  NAD   normal respiratory and cardiovascular effort   Abd: nontender, gravid  Uterus: no palpable contractions, nontender  Bilateral LE's:  no edema, NT    Cx:  deferred    MFM US today shows:  Breech presentation, Oligohydramnios, growth at 13%ile    Results reviewed with patient.     Vitals:    01/31/23 0614 02/03/23 0800 02/05/23 0730   Weight: 68.9 kg (151 lb 14.4 oz) 68 kg (150 lb) 68.8 kg (151 lb 9.6 oz)     Chart review: weight was 158 lbs on 1/13/23 prior to admission.   Net 6.5 lb weight loss since admission.           Recent Labs   Lab 02/08/23  0937 02/08/23  0303 02/07/23  2224 02/07/23  1859 02/07/23  1426 02/07/23  1011   GLC 93 155* 96 110* 114* 80         ASSESSMENT:  IUP @ 26w4d HD # 14 for PPROM    Patient stable, no symptoms or clinical signs of infection    -Type II DM -- followed by endocrine with improvement in BMZ induced hyperglycemia   - Maternal weight loss - net loss of 6.5 lbs since admission -- concern for maternal dietary restriction vs eating more healthy with smaller portions since admission   - Fetal growth at 13% on growth US today   -  Breech presentation     PLAN:  - discussed long term goal with delivery at 34 wks and route of delivery via  section if baby remains breech   - Recommended Increase dietary intake to promote adequate maternal weight gain, will be able to adjust insulin accordingly to maintain blood glucose control  - Continue TID fetal monitoring, weekly fluid assessment, q 3 wk growth US      Cynthia Whelan MD

## 2023-02-08 NOTE — PROGRESS NOTES
Diabetes Consult Daily  Progress Note          Assessment/Plan:     HPI:  Dario Nathan is a 27 year old with a past medical hx of T2DM who is a  at 24w5d by 6w0d US. Admitted 2023  for leakage of fluid.    Assessment:   1)  Type 2 Diabetes Mellitus; suboptimal control.  A1c 6.7%  2)  Acute hyperglycemia exacerbated by steroids- completed BMZ course -23    BG targets in pregnancy Less than 95 fasting, Less than 140 one hour post-meal, Less than 120 two hours post-meal, 0  blood sugars less than 60       Plan:     -  Lantus 9 units daily AM (0800)    -  Novolog with meals 1:7g CHO with breakfast, and 1:6.5g CHO with lunch, dinner, and 1:7g CHO with snacks.     -  Novolog moderate intensity sliding scale insulin TID AC >100 and HS >120 and 0200 correction     -  BG monitoring TID AC/HS/0200, with 2 hr PP.      -  Hypoglycemia protocol    -  Recommend carb counting protocol    -  Education :  TBD     -  Outpatient follow up:  recommend [facility] vs PCP     Plan discussed with patient and primary team via this note.          Interval History:     BG trend reviewed.        at 0300.  She tells me she had a bag of chips at bedtime and no CHO coverage given.  She doesn't usually eat junk food.  Has been trying to eat health and high protein.  Per patient- she has lost some weight and baby is measuring in 13%ile.   Patient eating well, denies nausea.  Dealing with some constipation.   She's averaging 35-60g CHO with meals most of the time.  Informed her that she can eat more carbs.  She was in agreement.  No other complaints.     Expectant management until 34 weeks, unless delivery clinically indicated sooner. Baby is breech.       Planned Procedures/surgeries: none  D5W-containing solutions/medications: none    Recent Labs   Lab 23  0303 23  2224 23  1859 23  1426 23  1011 23  0231   * 96 110* 114* 80 81          Nutrition:     Orders Placed This Encounter      Regular Diet Adult        PTA Regimen:     Medications:   Levemir 16 unit(s) once daily   BG monitoring frequency:  2-4 times per day   BG trends at home: 70's - 150's            Review of Systems:   CC: denies all          Medications:   Steroid planning:  BMZ course completed   Tube Feeding: no       Physical Exam:   /57 (BP Location: Right arm, Patient Position: Semi-Cardenas's)   Pulse 65   Temp 97.9  F (36.6  C) (Oral)   Resp 16   Wt 68.8 kg (151 lb 9.6 oz)   LMP 07/16/2022   SpO2 98%   BMI 27.73 kg/m       General:  pleasant, in no acute distress.  HEENT:  Hearing at level of conversation   Lungs:  No new cough, no SOB  Mental Status:  Alert and oriented x3  Psych:   Cooperative, full/appropriate affect         Data:     Lab Results   Component Value Date    A1C 6.7 10/11/2022    A1C 6.4 05/18/2022    A1C 5.8 03/19/2012        BMP  Recent Labs   Lab 02/08/23  0303 02/07/23  2224 02/07/23  1859 02/07/23  1426   * 96 110* 114*     CBC RESULTS: Recent Labs   Lab Test 01/26/23  1749   WBC 9.8   RBC 4.21   HGB 11.6*   HCT 35.4   MCV 84   MCH 27.6   MCHC 32.8   RDW 13.0        Recent Labs   Lab Test 02/03/23  1144 02/03/23  0930 01/18/23  1331 12/05/22  1101   * 75   < >  --    CR  --   --   --  0.50*    < > = values in this interval not displayed.       To contact Endocrine Diabetes service:   From 8AM-4PM: page inpatient diabetes provider who is following the patient that day (see filed or incomplete progress notes/consult notes).  If uncertain of provider assignment: page job code [id number]. (To page job code in-house dial 3 stars, [phone] then enter number).  For questions or updates AFTER HOURS from 4PM-8AM: page the diabetes job code for on call fellow: [phone]    Please notify inpatient diabetes service if changes are planned to steroids, nutrition, or if procedures are planned requiring prolonged NPO status.Diabetes Management Team job  code: [phone]    I spent a total of 35 minutes on the date of the encounter doing prep/post-work, chart review, history and exam, documentation and further activities per the note including lab review, multidisciplinary communication, counseling the patient and/or coordinating care regarding acute hyper/hypoglycemic management.  See note for details.        MAGALI Gallegos, CNP  Diabetes Management Service  Pager [phone]

## 2023-02-08 NOTE — PROGRESS NOTES
Patient sitting up in bed with numerous family members at bedside. Medications given and fresh ice water given. I told her I would come back when her family left to place her on the monitor and take her vitals and do my assessment.

## 2023-02-09 LAB
GLUCOSE BLDC GLUCOMTR-MCNC: 105 MG/DL (ref 70–99)
GLUCOSE BLDC GLUCOMTR-MCNC: 126 MG/DL (ref 70–99)
GLUCOSE BLDC GLUCOMTR-MCNC: 128 MG/DL (ref 70–99)
GLUCOSE BLDC GLUCOMTR-MCNC: 132 MG/DL (ref 70–99)
GLUCOSE BLDC GLUCOMTR-MCNC: 133 MG/DL (ref 70–99)
GLUCOSE BLDC GLUCOMTR-MCNC: 65 MG/DL (ref 70–99)
GLUCOSE BLDC GLUCOMTR-MCNC: 73 MG/DL (ref 70–99)
GLUCOSE BLDC GLUCOMTR-MCNC: 93 MG/DL (ref 70–99)

## 2023-02-09 PROCEDURE — 250N000013 HC RX MED GY IP 250 OP 250 PS 637: Performed by: OBSTETRICS & GYNECOLOGY

## 2023-02-09 PROCEDURE — 250N000013 HC RX MED GY IP 250 OP 250 PS 637: Performed by: STUDENT IN AN ORGANIZED HEALTH CARE EDUCATION/TRAINING PROGRAM

## 2023-02-09 PROCEDURE — 99232 SBSQ HOSP IP/OBS MODERATE 35: CPT | Performed by: CLINICAL NURSE SPECIALIST

## 2023-02-09 PROCEDURE — 120N000002 HC R&B MED SURG/OB UMMC

## 2023-02-09 RX ADMIN — ACYCLOVIR 400 MG: 400 TABLET ORAL at 14:46

## 2023-02-09 RX ADMIN — PRENATAL VITAMINS-IRON FUMARATE 27 MG IRON-FOLIC ACID 0.8 MG TABLET 1 TABLET: at 19:35

## 2023-02-09 RX ADMIN — ACYCLOVIR 400 MG: 400 TABLET ORAL at 10:32

## 2023-02-09 RX ADMIN — SENNOSIDES AND DOCUSATE SODIUM 1 TABLET: 8.6; 5 TABLET ORAL at 19:34

## 2023-02-09 RX ADMIN — ACYCLOVIR 400 MG: 400 TABLET ORAL at 19:35

## 2023-02-09 ASSESSMENT — ACTIVITIES OF DAILY LIVING (ADL)
ADLS_ACUITY_SCORE: 20

## 2023-02-09 NOTE — PROGRESS NOTES
Diabetes Consult Daily  Progress Note          Assessment/Plan:     HPI:  Dario Nathan is a 27 year old with a past medical hx of T2DM who is a  at 24w5d by 6w0d US on day of admission 2023-- admitted for PPROM    Assessment:   1)  Type 2 Diabetes Mellitus; suboptimal control.  A1c 6.7%  2)  Acute hyperglycemia exacerbated by steroids- completed BMZ course -23    BG targets in pregnancy Less than 95 fasting, Less than 140 one hour post-meal, Less than 120 two hours post-meal, 0  blood sugars less than 60       Plan:     -  Lantus 9 units daily AM (0800) will continue-- note admin time has been later due to sleeping in    -  Novolog with meals 1:7g CHO with breakfast, and 1:6.5g CHO with lunch, dinner, and 1:7g CHO with snacks.--> Dario will adjust her estimation of carbs in the home food at lunch today    -  Novolog moderate intensity sliding scale insulin TID AC >100 and HS >120 and 0200 correction     -  BG monitoring TID AC/HS/0200, with 2 hr PP.  With collection of post-prandial data today, the hope is to confirm insulin dosing sufficiency and shift to checking only fasting and post-prandially tomorrow    -  Hypoglycemia protocol    -  Recommend carb counting protocol    -  Education :  TBD     -  Outpatient follow up:  recommend [facility] Endocrinology vs PCP, educators ROWENA Narayan RD, HENRRY, ESTEBAN Beasley RD, HENRRY, ESTEBAN     Discussed with RN, patient          Interval History:     BG trend reviewed.     Has not been using  with nursing or IDS daily visits.  Today, declines professional .    Yesterday  at 0300.  She reported  she had a bag of chips at bedtime and no CHO coverage given.     Yesterday Informed her that she can eat more carbs-- will be covered w/ aspart    Dario felt fine with the BG 71 yesterday afternoon -- so sx low BG.  Dario does not recall any post-prandial BGs checked yesterday.  She believes the 1900 BG was high due  to carb estimation at lunch time.  Poor sleep last night and tried to sleep in today.  She's  tired of frequent finger pokes though doesn't report bruising.      Expectant management until 34 weeks, unless delivery clinically indicated sooner. Baby is breech.     Planned Procedures/surgeries: none  D5W-containing solutions/medications: none    Recent Labs   Lab 02/09/23  0325 02/08/23  2354 02/08/23  1915 02/08/23  1430 02/08/23  0937 02/08/23  0303   GLC 93 105* 127* 71 93 155*         Nutrition:     Orders Placed This Encounter      Regular Diet Adult        PTA Regimen:     Medications:   Levemir 16 unit(s) once daily   BG monitoring frequency:  2-4 times per day   BG trends at home: 70's - 150's            Review of Systems:   See interval hx         Medications:   Steroid planning:  BMZ course completed   Tube Feeding: no       Physical Exam:   /56   Pulse 77   Temp 98  F (36.7  C) (Oral)   Resp 16   Wt 68.8 kg (151 lb 9.6 oz)   LMP 07/16/2022   SpO2 98%   BMI 27.73 kg/m       General:  pleasant, in no acute distress  HEENT:  Hearing intact to conversational volume  Lungs:  Nocough, no SOB  Mental Status:  Alert and oriented x3  Psych:   Cooperative, calm, easily engaged         Data:     Lab Results   Component Value Date    A1C 6.7 10/11/2022    A1C 6.4 05/18/2022    A1C 5.8 03/19/2012        BMP  Recent Labs   Lab 02/09/23  0325 02/08/23  2354 02/08/23  1915 02/08/23  1430   GLC 93 105* 127* 71     Recent Labs   Lab Test 02/09/23  1038 02/09/23  0325 01/18/23  1331 12/05/22  1101   GLC 73 93   < >  --    CR  --   --   --  0.50*    < > = values in this interval not displayed.     CBC RESULTS: Recent Labs   Lab Test 01/26/23  1749   WBC 9.8   RBC 4.21   HGB 11.6*   HCT 35.4   MCV 84   MCH 27.6   MCHC 32.8   RDW 13.0        Due to coronavirus precautions, a telephone visit instead of an in-person visit took place.       Dario Nathan 27 year old who is being evaluated via a billable telephone  "visit.             \"This telephone visit will be conducted via a call between patient and  provider. We have found that certain health care needs can be provided without the need for a physical exam.  This service lets us provide the care you need with a phone conversation.\"       Patient has given verbal consent for Telephone visit? Yes           I spent a total of 8 minutes via telephone with patient, and 30 minutes floor time /care coordination managing glycemic care. See note for details.               To contact Endocrine Diabetes service:   From 8AM-4PM: page inpatient diabetes provider who is following the patient that day (see filed or incomplete progress notes/consult notes).  If uncertain of provider assignment: page job code [id number]. (To page job code in-house dial 3 stars, 777 then enter number).  For questions or updates AFTER HOURS from 4PM-8AM: page the diabetes job code for on call fellow: [phone]    Please notify inpatient diabetes service if changes are planned to steroids, nutrition, or if procedures are planned requiring prolonged NPO status.Diabetes Management Team job code: [phone]          "

## 2023-02-09 NOTE — PROGRESS NOTES
Went in to place patient on the monitor and she stated that she had more than normal leaking throughout the night and just changed a soaked pad. Patient denies any contractions or vaginal bleeding and states she is still feeling baby move.

## 2023-02-09 NOTE — PROGRESS NOTES
02/09/23 1443   Child Life   Location   (Antepartum)   Intervention Family Support;Sibling Support   Family Support Comment Supportive check-in provided to patient. Patient actively engaged in conversation with CCLS re: family coping, plan of care, anticipation for potential NICU admission, and ongoing hospitalization. Patient verbally processed the stressors related to separation from her family and maintaining their household.     Patient also processed her anticipation for potential NICU admission and her familiarity with the needs of her baby and what parenting looks like and feels like in that environment. Patient made appropriate comparisons to previous experience and noted how her past ante/NICU admission has helped her coping with and processed this ongoing stay.     CCLS familiarized patient with hospital locations that she can visit and spend time in; including [facility], coffee shop and [facility] with children.   Sibling Support Comment Sons have been visiting bedside per unit leadership approval. Additional art supplies provided to support sons' engagement at bedside. Additionally, traveling journals were provided to support ongoing connection and processing.   Outcomes/Follow Up Continue to Follow/Support  (ASCOM: *[id number])

## 2023-02-09 NOTE — PROGRESS NOTES
Patient is sitting up in bed visiting with family and friends. Patient states she is feeling well today, no contractions, vaginal bleeding, or leaking of fluid noted. Patient states she feels fetal movement. Patient states she was able to have a bowel movement today. I talked with patient about getting out of her room more and going to the gift shop or to the cafeteria as she has privileges to do so. She stated that she will possibly go out tomorrow.

## 2023-02-10 LAB
ABO/RH(D): NORMAL
ANTIBODY SCREEN: NEGATIVE
GLUCOSE BLDC GLUCOMTR-MCNC: 107 MG/DL (ref 70–99)
GLUCOSE BLDC GLUCOMTR-MCNC: 122 MG/DL (ref 70–99)
GLUCOSE BLDC GLUCOMTR-MCNC: 161 MG/DL (ref 70–99)
GLUCOSE BLDC GLUCOMTR-MCNC: 172 MG/DL (ref 70–99)
GLUCOSE BLDC GLUCOMTR-MCNC: 64 MG/DL (ref 70–99)
GLUCOSE BLDC GLUCOMTR-MCNC: 90 MG/DL (ref 70–99)
GLUCOSE BLDC GLUCOMTR-MCNC: 92 MG/DL (ref 70–99)
SPECIMEN EXPIRATION DATE: NORMAL

## 2023-02-10 PROCEDURE — 99231 SBSQ HOSP IP/OBS SF/LOW 25: CPT | Performed by: OBSTETRICS & GYNECOLOGY

## 2023-02-10 PROCEDURE — 250N000013 HC RX MED GY IP 250 OP 250 PS 637: Performed by: STUDENT IN AN ORGANIZED HEALTH CARE EDUCATION/TRAINING PROGRAM

## 2023-02-10 PROCEDURE — 120N000002 HC R&B MED SURG/OB UMMC

## 2023-02-10 PROCEDURE — 86901 BLOOD TYPING SEROLOGIC RH(D): CPT | Performed by: STUDENT IN AN ORGANIZED HEALTH CARE EDUCATION/TRAINING PROGRAM

## 2023-02-10 PROCEDURE — 36415 COLL VENOUS BLD VENIPUNCTURE: CPT | Performed by: STUDENT IN AN ORGANIZED HEALTH CARE EDUCATION/TRAINING PROGRAM

## 2023-02-10 PROCEDURE — 86850 RBC ANTIBODY SCREEN: CPT | Performed by: STUDENT IN AN ORGANIZED HEALTH CARE EDUCATION/TRAINING PROGRAM

## 2023-02-10 PROCEDURE — 250N000013 HC RX MED GY IP 250 OP 250 PS 637: Performed by: OBSTETRICS & GYNECOLOGY

## 2023-02-10 PROCEDURE — 99233 SBSQ HOSP IP/OBS HIGH 50: CPT | Performed by: CLINICAL NURSE SPECIALIST

## 2023-02-10 RX ADMIN — POLYETHYLENE GLYCOL 3350 17 G: 17 POWDER, FOR SOLUTION ORAL at 09:53

## 2023-02-10 RX ADMIN — SENNOSIDES AND DOCUSATE SODIUM 2 TABLET: 50; 8.6 TABLET ORAL at 20:46

## 2023-02-10 RX ADMIN — ACYCLOVIR 400 MG: 400 TABLET ORAL at 09:55

## 2023-02-10 RX ADMIN — INSULIN ASPART 2 UNITS: 100 INJECTION, SOLUTION INTRAVENOUS; SUBCUTANEOUS at 19:01

## 2023-02-10 RX ADMIN — PRENATAL VITAMINS-IRON FUMARATE 27 MG IRON-FOLIC ACID 0.8 MG TABLET 1 TABLET: at 20:48

## 2023-02-10 RX ADMIN — ACYCLOVIR 400 MG: 400 TABLET ORAL at 20:48

## 2023-02-10 RX ADMIN — ACYCLOVIR 400 MG: 400 TABLET ORAL at 16:39

## 2023-02-10 ASSESSMENT — ACTIVITIES OF DAILY LIVING (ADL)
ADLS_ACUITY_SCORE: 20

## 2023-02-10 NOTE — PROGRESS NOTES
Provider notified of strip this mornin noteable decelerations. 1 prolonged lasting about 5 minutes at 0640. Described decels.     To have pt remain on monitor x1 hr post prolonged. NPO until provider able to review strip.

## 2023-02-10 NOTE — PROGRESS NOTES
Diabetes Consult Daily  Progress Note          Assessment/Plan:     HPI:  Dario Nathan is a 27 year old with a past medical hx of T2DM who is a  at 24w5d by 6w0d US on day of admission 2023-- admitted for PPROM    Assessment:   1)  Type 2 Diabetes Mellitus; suboptimal control.  A1c 6.7%  2)  Acute hyperglycemia exacerbated by steroids- completed BMZ course -23    BG targets in pregnancy Less than 95 fasting, Less than 140 one hour post-meal, Less than 120 two hours post-meal, 0  blood sugars less than 60       Plan:     -  Lantus 9 units daily AM (0800) will continue-- note admin time has been later due to sleeping in    -  All post-prandial glucose values  above target, so increase Novolog with meals to 1:6.5 (1:7g) CHO with breakfast, and to 1:6 (1:6.5g) CHO with lunch, dinner, and (6.6) 1:7g CHO with snacks    -  Novolog moderate intensity sliding scale insulin TID AC >100 and HS >120 and 0200 correction --> responded to nursing in sticky note: Yes, correction scale and carb coverage to be administered together      -  BG monitoring TID AC/HS/0200, with 2 hr PP--> accurate timing of this check is very helpful since there are not target standards for 2.5 or 3 hours post-prandial    -  Hypoglycemia protocol    -  Recommend carb counting protocol    -  Education :  TBD     -  Outpatient follow up:  recommend [facility] Endocrinology vs PCP, educators ROWENA Narayan RD, HENRRY, ESTEBAN Beasley RD, HENRRY, ESTEBAN     Discussed with RN, patient          Interval History:     BG trend reviewed.   Post-prandials uniformly above target.  Dario attributes this to the challenge in estimating carbs in food from outside.  Discussed it is safe to both increase insulin to carb ratio and estimate her carbs slightly higher.  BG 65 yesterday afternoon followed and increase in activity-- she walked down in lobby, etc.  No bothersome sx low BG.  Reviewed numbers above target and not yet able to  decrease finger stick freq.  She overall feels well, slept better last night.  Enjoying having visitors.      Hx -- at 0300.  She reported  she had a bag of chips at bedtime and no CHO coverage given.     Hx --Wed was Informed that she can eat more carbs-- will be covered w/ aspart        Expectant management until 34 weeks, unless delivery clinically indicated sooner. Baby is breech.     Planned Procedures/surgeries: none  D5W-containing solutions/medications: none    Recent Labs   Lab 02/10/23  0159 02/09/23  2147 02/09/23  1939 02/09/23  1829 02/09/23  1612 02/09/23  1330   * 132* 128* 126* 65* 133*         Nutrition:     Orders Placed This Encounter      Regular Diet Adult        PTA Regimen:     Medications:   Levemir 16 unit(s) once daily   BG monitoring frequency:  2-4 times per day   BG trends at home: 70's - 150's            Review of Systems:   See interval hx         Medications:   Steroid planning:  BMZ course completed   Tube Feeding: no       Physical Exam:   /55 (BP Location: Right arm, Patient Position: Semi-Cardenas's, Cuff Size: Adult Regular)   Pulse 77   Temp 98  F (36.7  C) (Oral)   Resp 16   Wt 69.4 kg (153 lb 1.6 oz)   LMP 07/16/2022   SpO2 98%   BMI 28.00 kg/m       General:  pleasant, in no acute distress, up at edge of bed  HEENT:  Hearing intact to conversational volume  Lungs:  Nocough, no SOB  Mental Status:  Alert and oriented x3  Psych:   Cooperative, calm, easily engaged, positive energy         Data:     Lab Results   Component Value Date    A1C 6.7 10/11/2022    A1C 6.4 05/18/2022    A1C 5.8 03/19/2012        BMP  Recent Labs   Lab 02/10/23  0159 02/09/23  2147 02/09/23  1939 02/09/23  1829   * 132* 128* 126*     Recent Labs   Lab Test 02/09/23  1038 02/09/23  0325 01/18/23  1331 12/05/22  1101   GLC 73 93   < >  --    CR  --   --   --  0.50*    < > = values in this interval not displayed.     CBC RESULTS: Recent Labs   Lab Test 01/26/23  1749   WBC  9.8   RBC 4.21   HGB 11.6*   HCT 35.4   MCV 84   MCH 27.6   MCHC 32.8   RDW 13.0              50 minutes spent on the date of the encounter doing chart review, history and exam, coordinating care/communication, documentation and further activities per the note.          To contact Endocrine Diabetes service:   From 8AM-4PM: page inpatient diabetes provider who is following the patient that day (see filed or incomplete progress notes/consult notes).  If uncertain of provider assignment: page job code [phone]. (To page job code in-house dial 3 stars, [phone] then enter number).  For questions or updates AFTER HOURS from 4PM-8AM: page the diabetes job code for on call fellow: [phone]    Please notify inpatient diabetes service if changes are planned to steroids, nutrition, or if procedures are planned requiring prolonged NPO status.Diabetes Management Team job code: [phone]

## 2023-02-10 NOTE — PROVIDER NOTIFICATION
Dr Singh and Dr Sylvester notified that tracing extended for additional hour without decelerations. Tracing reviewed by Dr Sylvester - ok to remove monitors. Patient updated.

## 2023-02-10 NOTE — PROVIDER NOTIFICATION
02/10/23 1402   Provider Notification   Provider Name/Title Dr. Berry   Method of Notification Electronic Page   Notification Reason Decels     decel at 1355 on the fetal heart monitor. repositioned the patient. Would you like extended monitoring?

## 2023-02-10 NOTE — PROGRESS NOTES
Pt vitally stable. FHT Cat I, will reassess at 0600. Insulin required at 0200, along with scheduled doses. Pt sleeping well between cares.

## 2023-02-10 NOTE — PROGRESS NOTES
[facility] ANTEPARTUM PROGRESS NOTE:   23  7:41 PM           SUBJECTIVE:   Patient reports active fetal movement. No contractions, no bleeding, no pain. Continues to leak clear fluid with increased leaking overnight but now back to small amounts. Rash resolved with hydrocortisone cream. Her sons have been able to visit and she appreciated Child Life visiting with her to talk about ways to engage her boys (10 and 7) when they are here in the hospital. She has also been getting lots of visitors and has two friends here this evening. She did have a hard time sleeping last night and we discussed that PRN hydroxizine and benadryl are ordered and she can take either one to help her sleep.          OBJECTIVE:     Vitals:    23 2136 23 0801 23 1100 23 1337   BP: 110/56 101/56  104/54   BP Location:       Patient Position:    Semi-Cardenas's   Cuff Size:    Adult Regular   Pulse:       Resp: 16 16  16   Temp: 98.9  F (37.2  C) 98  F (36.7  C)  97.7  F (36.5  C)   TempSrc: Oral Oral  Oral   SpO2:       Weight:   69.4 kg (153 lb 1.6 oz)        NST:  Fetal Heart Rate Tracin bpm baseline, moderate variability, no decels, 10 x 10 accels   Tocometer: Non contractions    Gen: alert, oriented, no distress, very pleasant  Resp: normal respiratory effort  Abdomen:  Gravid, nontender  Extremities: warm, well perfused, no edema bilateral lower extremities      Recent Results (from the past 24 hour(s))   Glucose by meter    Collection Time: 23 11:54 PM   Result Value Ref Range    GLUCOSE BY METER POCT 105 (H) 70 - 99 mg/dL   Glucose by meter    Collection Time: 23  3:25 AM   Result Value Ref Range    GLUCOSE BY METER POCT 93 70 - 99 mg/dL   Glucose by meter    Collection Time: 23 10:38 AM   Result Value Ref Range    GLUCOSE BY METER POCT 73 70 - 99 mg/dL   Glucose by meter    Collection Time: 23  1:30 PM   Result Value Ref Range    GLUCOSE BY METER POCT 133 (H) 70 - 99 mg/dL    Glucose by meter    Collection Time: 23  4:12 PM   Result Value Ref Range    GLUCOSE BY METER POCT 65 (L) 70 - 99 mg/dL   Glucose by meter    Collection Time: 23  6:29 PM   Result Value Ref Range    GLUCOSE BY METER POCT 126 (H) 70 - 99 mg/dL   Glucose by meter    Collection Time: 23  7:39 PM   Result Value Ref Range    GLUCOSE BY METER POCT 128 (H) 70 - 99 mg/dL                ASSESSMENT/PLAN:    27 year old  26w5d by 6w4d US, HD#15 admitted for PPROM. Pregnancy notable for T2DM and hx HSV. Stable without any signs or symptoms of  labor, infection, abruption. Intermittent category 2 tracing due to isolated possible decels, overall appropriate for gestational age.      PPROM  - Wet prep +clue cells, s/p flagyl, GC/CT neg, UA neg  - MFM US () EFW 41%, repeat  oligo. Fluid check US 2023 shows MVP 2.8cm. Plan for repeat fluid check weekly, growth  showed EFW 13%ile   - s/p BMZ (-)  - s/p latency antibiotics   - S/p NICU consult  - fetus breech as of    - Stable today, no fundal tenderness, no contractions, no bleeding, no fever, continues to leak clear fluid.      Type 2 DM  - Lantus 9 unit(s) in AM, Novolog 1:7 cho, mSSI per endocrine  - s/p insulin gtt (-) during steroid course  - Endocrinology following closely, appreciate their management.      HSV  - Acyclovir 400mg PO TID for prophylaxis     PNC  - Rh pos, Rubella immune, GBS neg  - Tdap due, ordered      FWB  Appropriate for gestational age   - TID monitoring   -  surveillance beginning 28 weeks  - S/p BMZ -  - Magnesium if moving toward deliv <32 weeks  - NICU for delivery  - EFW 13%ile on  continue q 3 week growth US     Delivery Plan: expectant management to 34 weeks unless delivery clinically indicated sooner     Anna Maki MD

## 2023-02-10 NOTE — PROGRESS NOTES
Pt states she is doing well. States she is a little more concerned today as she had more leaking of fluid in the night and was not able to rest well because of that and babe's increased activity. Pt has less leaking throughout day shift today and is feeling more reassured. VSS. Afebrile. Pt denies any pain, cramping, contractions or bleeding throughout the day today. Denies spots, blurred vision, epigastric pain or HA. States she is eating healthier and more clear on her carb counting since hospitalized.

## 2023-02-10 NOTE — PROVIDER NOTIFICATION
02/10/23 1620   Provider Notification   Provider Name/Title Dr. Singh   Method of Notification In Department   Notification Reason Other (Comment)     Informed Dr. Singh that the patient's BG was 64 and that the RN was going to recheck in 15 minutes. Asked Dr. Singh if the RN should still do carb coverage for her lunch with the BG 64. After talking to Dr. Singh and looking at the MAR order for the carb coverage, Dr. Singh advised to still do the carb coverage with the insulin after the hypoglycemia BG was above 100.

## 2023-02-11 LAB
GLUCOSE BLDC GLUCOMTR-MCNC: 127 MG/DL (ref 70–99)
GLUCOSE BLDC GLUCOMTR-MCNC: 138 MG/DL (ref 70–99)
GLUCOSE BLDC GLUCOMTR-MCNC: 139 MG/DL (ref 70–99)
GLUCOSE BLDC GLUCOMTR-MCNC: 144 MG/DL (ref 70–99)
GLUCOSE BLDC GLUCOMTR-MCNC: 78 MG/DL (ref 70–99)
GLUCOSE BLDC GLUCOMTR-MCNC: 85 MG/DL (ref 70–99)
GLUCOSE BLDC GLUCOMTR-MCNC: 97 MG/DL (ref 70–99)

## 2023-02-11 PROCEDURE — 250N000013 HC RX MED GY IP 250 OP 250 PS 637: Performed by: OBSTETRICS & GYNECOLOGY

## 2023-02-11 PROCEDURE — 120N000002 HC R&B MED SURG/OB UMMC

## 2023-02-11 PROCEDURE — 99232 SBSQ HOSP IP/OBS MODERATE 35: CPT | Performed by: NURSE PRACTITIONER

## 2023-02-11 PROCEDURE — 99231 SBSQ HOSP IP/OBS SF/LOW 25: CPT | Mod: GC | Performed by: OBSTETRICS & GYNECOLOGY

## 2023-02-11 PROCEDURE — 250N000013 HC RX MED GY IP 250 OP 250 PS 637: Performed by: STUDENT IN AN ORGANIZED HEALTH CARE EDUCATION/TRAINING PROGRAM

## 2023-02-11 RX ADMIN — ACYCLOVIR 400 MG: 400 TABLET ORAL at 14:17

## 2023-02-11 RX ADMIN — ACYCLOVIR 400 MG: 400 TABLET ORAL at 21:19

## 2023-02-11 RX ADMIN — INSULIN ASPART 1 UNITS: 100 INJECTION, SOLUTION INTRAVENOUS; SUBCUTANEOUS at 19:08

## 2023-02-11 RX ADMIN — ACYCLOVIR 400 MG: 400 TABLET ORAL at 09:07

## 2023-02-11 RX ADMIN — SENNOSIDES AND DOCUSATE SODIUM 2 TABLET: 50; 8.6 TABLET ORAL at 09:07

## 2023-02-11 RX ADMIN — PRENATAL VITAMINS-IRON FUMARATE 27 MG IRON-FOLIC ACID 0.8 MG TABLET 1 TABLET: at 21:19

## 2023-02-11 ASSESSMENT — ACTIVITIES OF DAILY LIVING (ADL)
ADLS_ACUITY_SCORE: 20

## 2023-02-11 NOTE — PROGRESS NOTES
GRIFFIN MD ANTEPARTUM PROGRESS NOTE:   February 10, 2023           SUBJECTIVE:   Patient reports active fetal movement. No contractions or bleeding. Still leaking clear fluid daily. Feels more has come out since baby turned to breech presentation. Would like to have IV removed if possible since uncomfortable in hand and she has been stable. Understands more about the diabetes and needing to eat for carbs, just has not been super hungry. Mood is good. Lots of visitors--has poster in room.          OBJECTIVE:     Vitals:    02/09/23 2030 02/09/23 2033 02/10/23 0944 02/10/23 1643   BP: 116/55  92/61 98/53   BP Location: Right arm      Patient Position: Semi-Cardenas's      Cuff Size: Adult Regular      Pulse:       Resp:  16 18 16   Temp:  98  F (36.7  C) 98.4  F (36.9  C) 98.3  F (36.8  C)   TempSrc:  Oral Oral Oral   SpO2:       Weight:           NST:  Fetal Heart Rate Tracin bpm baseline, moderate variability, no decels,+accels  Did have one decel this am that was variable, prolonged for 2 min   Tocometer: Non contractions    Gen: alert, oriented, no distress, very pleasant  Resp: normal respiratory effort  Abdomen:  Gravid, nontender  Extremities: warm, well perfused, no edema bilateral lower extremities      Recent Results (from the past 24 hour(s))   Glucose by meter    Collection Time: 23  7:39 PM   Result Value Ref Range    GLUCOSE BY METER POCT 128 (H) 70 - 99 mg/dL   Glucose by meter    Collection Time: 23  9:47 PM   Result Value Ref Range    GLUCOSE BY METER POCT 132 (H) 70 - 99 mg/dL   Glucose by meter    Collection Time: 02/10/23  1:59 AM   Result Value Ref Range    GLUCOSE BY METER POCT 122 (H) 70 - 99 mg/dL   Adult Type and Screen    Collection Time: 02/10/23  8:04 AM   Result Value Ref Range    ABO/RH(D) O POS     Antibody Screen Negative Negative    SPECIMEN EXPIRATION DATE [id number]    Glucose by meter    Collection Time: 02/10/23  9:52 AM   Result Value Ref Range    GLUCOSE BY  METER POCT 92 70 - 99 mg/dL   Glucose by meter    Collection Time: 02/10/23 11:53 AM   Result Value Ref Range    GLUCOSE BY METER POCT 161 (H) 70 - 99 mg/dL   Glucose by meter    Collection Time: 02/10/23  4:13 PM   Result Value Ref Range    GLUCOSE BY METER POCT 64 (L) 70 - 99 mg/dL   Glucose by meter    Collection Time: 02/10/23  4:37 PM   Result Value Ref Range    GLUCOSE BY METER POCT 107 (H) 70 - 99 mg/dL   Glucose by meter    Collection Time: 02/10/23  6:35 PM   Result Value Ref Range    GLUCOSE BY METER POCT 172 (H) 70 - 99 mg/dL                ASSESSMENT/PLAN:    27 year old  26w6d by 6w4d US, HD#16 admitted for PPROM. Pregnancy notable for T2DM and hx HSV. Stable without any signs or symptoms of  labor, infection, abruption. Intermittent category 2 tracing with occasional decels that resolve.     PPROM  - Wet prep +clue cells, s/p flagyl, GC/CT neg, UA neg  - MFM US () EFW 41%, repeat  oligo. Fluid check US 2023 shows MVP 2.8cm. Plan for repeat fluid check weekly, growth  showed EFW 13%ile   - s/p BMZ (-)  - s/p latency antibiotics   - S/p NICU consult  - fetus breech as of       Type 2 DM  - Lantus 9 unit(s) in AM, Novolog 1:7 cho, mSSI per endocrine  - s/p insulin gtt (-) during steroid course  - Endocrinology following closely, appreciate their management.      HSV  - Acyclovir 400mg PO TID for prophylaxis     PNC  - Rh pos, Rubella immune, GBS neg  - Tdap ordered for 28 wks     FWB  Appropriate for gestational age   - TID monitoring   - S/p BMZ -  - Magnesium if moving toward deliv <32 weeks  - NICU for delivery  - EFW 13%ile on  continue q 3 week growth US     Delivery Plan: expectant management to 34 weeks unless delivery clinically indicated sooner      Dionna Berry MD

## 2023-02-11 NOTE — PROGRESS NOTES
Diabetes Consult Daily  Progress Note          Assessment/Plan:     HPI:  Dario Nathan is a 27 year old with a past medical hx of T2DM who is a  at 24w5d by 6w0d US on day of admission 2023-- admitted for PPROM    Assessment:   1)  Type 2 Diabetes Mellitus; suboptimal control.  A1c 6.7%  2)  Acute hyperglycemia exacerbated by steroids- completed BMZ course -23    BG targets in pregnancy Less than 95 fasting, Less than 140 one hour post-meal, Less than 120 two hours post-meal, 0  blood sugars less than 60       Plan:     -  Lantus 9 units daily AM (0800) will continue-- note admin time has been later due to sleeping in    -  Novolog with meals to 1:6.5 CHO with breakfast, and to 1:6  CHO with lunch, dinner, and 1:6.5g CHO with snacks    -  Novolog moderate intensity sliding scale insulin TID AC >100 and HS >120 and 0200 correction     -  BG monitoring TID AC/HS/0200, with 2 hr PP--> accurate timing of this check is very helpful since there are not target standards for 2.5 or 3 hours post-prandial    -  Hypoglycemia protocol    -  Recommend carb counting protocol    -  Education :  TBD     -  Outpatient follow up:  recommend [facility] vs PCP, educators ROWENA Narayan RD, HENRRY, ESTEBAN Beasley RD, HENRRY, ESTEBAN     Discussed with RN, patient          Interval History:     BG trend reviewed.   BG 80s overnight.  Then 127 2 hours post prandial after Bfast.  Will trend today and not make changes unless pattern emerges.  Patient is eating well, no complaints.     Expectant management until 34 weeks, unless delivery clinically indicated sooner. Baby is breech.     Planned Procedures/surgeries: none  D5W-containing solutions/medications: none    Recent Labs   Lab 23  0158 02/10/23  2140 02/10/23  1835 02/10/23  1637 02/10/23  1613 02/10/23  1153   GLC 85 90 172* 107* 64* 161*         Nutrition:     Orders Placed This Encounter      Regular Diet Adult        PTA Regimen:      Medications:   Levemir 16 unit(s) once daily   BG monitoring frequency:  2-4 times per day   BG trends at home: 70's - 150's            Review of Systems:   See interval hx         Medications:   Steroid planning:  BMZ course completed   Tube Feeding: no       Physical Exam:   BP 96/52 (BP Location: Left arm, Patient Position: Left side, Cuff Size: Adult Regular)   Pulse 77   Temp 98.2  F (36.8  C) (Oral)   Resp 16   Wt 70.9 kg (156 lb 4.8 oz)   LMP 07/16/2022   SpO2 98%   BMI 28.59 kg/m       General:  pleasant, in no acute distress, up at edge of bed  HEENT:  Hearing intact to conversational volume  Lungs:  [place], no SOB  Mental Status:  Alert and oriented x3  Psych:   Cooperative, calm, easily engaged, positive energy         Data:     Lab Results   Component Value Date    A1C 6.7 10/11/2022    A1C 6.4 05/18/2022    A1C 5.8 03/19/2012        BMP  Recent Labs   Lab 02/11/23  0158 02/10/23  2140 02/10/23  1835 02/10/23  1637   GLC 85 90 172* 107*     Recent Labs   Lab Test 02/09/23  1038 02/09/23  0325 01/18/23  1331 12/05/22  1101   GLC 73 93   < >  --    CR  --   --   --  0.50*    < > = values in this interval not displayed.     CBC RESULTS: Recent Labs   Lab Test 01/26/23  1749   WBC 9.8   RBC 4.21   HGB 11.6*   HCT 35.4   MCV 84   MCH 27.6   MCHC 32.8   RDW 13.0              35 minutes spent on the date of the encounter doing chart review, history and exam, coordinating care/communication, documentation and further activities per the note.          To contact Endocrine Diabetes service:   From 8AM-4PM: page inpatient diabetes provider who is following the patient that day (see filed or incomplete progress notes/consult notes).  If uncertain of provider assignment: page job code [id number]. (To page job code in-house dial 3 stars, [phone] then enter number).  For questions or updates AFTER HOURS from 4PM-8AM: page the diabetes job code for on call fellow: [phone]    Please notify inpatient diabetes  service if changes are planned to steroids, nutrition, or if procedures are planned requiring prolonged NPO status.Diabetes Management Team job code: [phone]    MAGALI Gallegos, CNP  Inpatient Diabetes Management Service  Pager [phone]

## 2023-02-11 NOTE — PROVIDER NOTIFICATION
02/11/23 0735   Provider Notification   Provider Name/Title Dr. Irene   Method of Notification Electronic Page   Notification Reason Decels     could you review the strip? looks like there was another decel at 0722. would you like to extend monitoring to 0825?

## 2023-02-11 NOTE — PROGRESS NOTES
VERONIQUE MARTINEZ ANTEPARTUM PROGRESS NOTE:   2023           SUBJECTIVE:   Patient reports active fetal movement. No contractions or bleeding. Still leaking small amt clear fluid daily. No questions for today. Her plan for the day is to Facetime her cousin and do their makeup together.         OBJECTIVE:     Vitals:    02/10/23 2329 23 0624 23 0637 23 0908   BP: 98/56  96/52 95/51   BP Location: Right arm  Left arm    Patient Position: Sitting  Left side    Cuff Size:   Adult Regular    Pulse:       Resp: 16  16 16   Temp: 98.3  F (36.8  C)  98.2  F (36.8  C) 98  F (36.7  C)   TempSrc: Oral  Oral Oral   SpO2:       Weight:  70.9 kg (156 lb 4.8 oz)         Gen: alert, oriented, no distress, very pleasant  Resp: normal respiratory effort  Abdomen:  Gravid, nontender  Extremities: warm, well perfused, no edema bilateral lower extremities    NST  5720-2807  Fetal Heart Rate Tracin bpm baseline, moderate variability, + [id number], isolated possible subtle decels at 0633 and 0720, no other decels  Tocometer: No contractions      Recent Results (from the past 24 hour(s))   Glucose by meter    Collection Time: 02/10/23  4:13 PM   Result Value Ref Range    GLUCOSE BY METER POCT 64 (L) 70 - 99 mg/dL   Glucose by meter    Collection Time: 02/10/23  4:37 PM   Result Value Ref Range    GLUCOSE BY METER POCT 107 (H) 70 - 99 mg/dL   Glucose by meter    Collection Time: 02/10/23  6:35 PM   Result Value Ref Range    GLUCOSE BY METER POCT 172 (H) 70 - 99 mg/dL   Glucose by meter    Collection Time: 02/10/23  9:40 PM   Result Value Ref Range    GLUCOSE BY METER POCT 90 70 - 99 mg/dL   Glucose by meter    Collection Time: 23  1:58 AM   Result Value Ref Range    GLUCOSE BY METER POCT 85 70 - 99 mg/dL   Glucose by meter    Collection Time: 23  9:59 AM   Result Value Ref Range    GLUCOSE BY METER POCT 78 70 - 99 mg/dL   Glucose by meter    Collection Time: 23 12:03 PM   Result Value  Ref Range    GLUCOSE BY METER POCT 127 (H) 70 - 99 mg/dL                ASSESSMENT/PLAN:    27 year old  27w0dd by 6w4d US, HD#17 admitted for PPROM. Pregnancy notable for T2DM and hx HSV. Stable without any signs or symptoms of  labor, infection, abruption. Intermittent category 2 tracing with occasional decels that resolve.     PPROM  - Wet prep +clue cells, s/p flagyl, GC/CT neg, UA neg  - MFM US () EFW 41%, repeat  oligo. Fluid check US 2023 shows MVP 2.8cm. Plan for repeat fluid check weekly, growth  showed EFW 13%ile   - s/p BMZ (-)  - s/p latency antibiotics   - S/p NICU consult  - fetus breech as of       Type 2 DM  - Lantus 9 unit(s) in AM, Novolog 1:6.5 CHO w/ breakfast, 1:6 CHO with lunch/dinner, 1:7 CHO w/ snacks, mSSI per endocrine  - s/p insulin gtt (-) during steroid course  - Endocrinology following closely, appreciate their management.      HSV  - Acyclovir 400mg PO TID for prophylaxis     PNC  - Rh pos, Rubella immune, GBS neg  - Tdap ordered for 28 wks     FWB  Appropriate for gestational age   - TID monitoring   - S/p BMZ -  - Magnesium if moving toward deliv <32 weeks  - NICU for delivery  - EFW 13%ile on  continue q 3 week growth US     Delivery Plan: expectant management to 34 weeks unless delivery clinically indicated sooner      Maura Irene MD  OB/GYN, PGY-3  2023, 1:00 PM         Physician Attestation   I personally examined and evaluated this patient.  I discussed the patient with the resident/fellow and care team, and agree with the assessment and plan of care as documented in the note.     Key findings: 27 year old  at 27w0d with PPROM. No s/sx infection,  labor, abruption, fetal distress. Intermittent category 2 tracing due to isolated small decels. Overall appropriate for gestational age. S/p BMZ and latency abx. Continue inpatient management until delivery.    Please see A&P for additional details of medical  decision making.        Erin Rivas MD  Date of Service (when I saw the patient): 02/11/23

## 2023-02-12 LAB
GLUCOSE BLDC GLUCOMTR-MCNC: 113 MG/DL (ref 70–99)
GLUCOSE BLDC GLUCOMTR-MCNC: 119 MG/DL (ref 70–99)
GLUCOSE BLDC GLUCOMTR-MCNC: 155 MG/DL (ref 70–99)
GLUCOSE BLDC GLUCOMTR-MCNC: 73 MG/DL (ref 70–99)
GLUCOSE BLDC GLUCOMTR-MCNC: 74 MG/DL (ref 70–99)
GLUCOSE BLDC GLUCOMTR-MCNC: 94 MG/DL (ref 70–99)
GLUCOSE BLDC GLUCOMTR-MCNC: 99 MG/DL (ref 70–99)

## 2023-02-12 PROCEDURE — 120N000002 HC R&B MED SURG/OB UMMC

## 2023-02-12 PROCEDURE — 99231 SBSQ HOSP IP/OBS SF/LOW 25: CPT | Mod: GC | Performed by: OBSTETRICS & GYNECOLOGY

## 2023-02-12 PROCEDURE — 99232 SBSQ HOSP IP/OBS MODERATE 35: CPT | Performed by: NURSE PRACTITIONER

## 2023-02-12 PROCEDURE — 250N000013 HC RX MED GY IP 250 OP 250 PS 637: Performed by: OBSTETRICS & GYNECOLOGY

## 2023-02-12 PROCEDURE — 250N000013 HC RX MED GY IP 250 OP 250 PS 637: Performed by: STUDENT IN AN ORGANIZED HEALTH CARE EDUCATION/TRAINING PROGRAM

## 2023-02-12 RX ADMIN — SENNOSIDES AND DOCUSATE SODIUM 2 TABLET: 50; 8.6 TABLET ORAL at 08:42

## 2023-02-12 RX ADMIN — ACYCLOVIR 400 MG: 400 TABLET ORAL at 15:04

## 2023-02-12 RX ADMIN — ACYCLOVIR 400 MG: 400 TABLET ORAL at 08:38

## 2023-02-12 RX ADMIN — ACYCLOVIR 400 MG: 400 TABLET ORAL at 20:55

## 2023-02-12 RX ADMIN — PRENATAL VITAMINS-IRON FUMARATE 27 MG IRON-FOLIC ACID 0.8 MG TABLET 1 TABLET: at 20:55

## 2023-02-12 RX ADMIN — SENNOSIDES AND DOCUSATE SODIUM 1 TABLET: 8.6; 5 TABLET ORAL at 20:56

## 2023-02-12 ASSESSMENT — ACTIVITIES OF DAILY LIVING (ADL)
ADLS_ACUITY_SCORE: 20

## 2023-02-12 NOTE — PROGRESS NOTES
Diabetes Consult Daily  Progress Note          Assessment/Plan:     HPI:  Dario Nathan is a 27 year old with a past medical hx of T2DM who is a  at 24w5d by 6w0d US on day of admission 2023-- admitted for PPROM    Assessment:   1)  Type 2 Diabetes Mellitus; suboptimal control.  A1c 6.7%  2)  Acute hyperglycemia exacerbated by steroids- completed BMZ course -23    BG targets in pregnancy Less than 95 fasting, Less than 140 one hour post-meal, Less than 120 two hours post-meal, 0  blood sugars less than 60       Plan:     -  Lantus 9 units daily AM (0800) will continue-- note admin time has been later due to sleeping in    -  Novolog with meals: increase to 1:6g CHO from 1:6.5 CHO with breakfast, and increase to 1:5.5g  CHO with lunch, dinner from 1:6g CHO, and increase to 1:6g CHO from 1:6.5g CHO with snacks    -  Novolog moderate intensity sliding scale insulin TID AC >100 and HS >120 and 0200 correction     -  BG monitoring TID AC/HS/0200, with 2 hr PP--> accurate timing of this check is very helpful since there are not target standards for 2.5 or 3 hours post-prandial    -  Hypoglycemia protocol    -  Recommend carb counting protocol    -  Education :  TBD     -  Outpatient follow up:  recommend [facility] End[facility]inology vs PCP, educators ROWENA Narayan RD, HENRRY, ESTEBAN Beasley RD, HENRRY, ESTEBAN     Discussed with RN, patient          Interval History:     BG trend reviewed.         BG above target consistently post prandially.  Will increase meal time novolog all around.  Patient tells me she had lunch yesterday from home and estimated carbs.  Leave lantus at 9 units as fasting BG 70s this AM. Patient has no concerns, walking the unit.   Expectant management until 34 weeks, unless delivery clinically indicated sooner. Baby is breech.     Planned Procedures/surgeries: none  D5W-containing solutions/medications: none    Recent Labs   Lab 23  0204 23  2113 23  1904  02/11/23  1546 02/11/23  1342 02/11/23  1203   GLC 94 139* 138* 144* 97 127*         Nutrition:     Orders Placed This Encounter      Regular Diet Adult        PTA Regimen:     Medications:   Levemir 16 unit(s) once daily   BG monitoring frequency:  2-4 times per day   BG trends at home: 70's - 150's            Review of Systems:   See interval hx         Medications:   Steroid planning:  BMZ course completed   Tube Feeding: no       Physical Exam:   BP 94/52 (BP Location: Left arm, Patient Position: Semi-Cardenas's, Cuff Size: Adult Regular)   Pulse 77   Temp (P) 98.1  F (36.7  C) (Oral)   Resp 16   Wt 70.9 kg (156 lb 4.8 oz)   LMP 07/16/2022   SpO2 98%   BMI 28.59 kg/m       General: pleasant, in no distress. Walking around the room  HEENT: normocephalic, atraumatic. Oral mucous membranes moist.   Lungs: unlabored respiration, no cough  ABD: rounded, nondistended  Skin: warm and dry, no obvious lesions  MSK:  moves all extremities  Mental status:  alert, oriented to self, place, time  Psych:   calm and appropriate interaction            Data:     Lab Results   Component Value Date    A1C 6.7 10/11/2022    A1C 6.4 05/18/2022    A1C 5.8 03/19/2012        BMP  Recent Labs   Lab 02/12/23  0204 02/11/23  2113 02/11/23  1904 02/11/23  1546   GLC 94 139* 138* 144*     Recent Labs   Lab Test 02/09/23  1038 02/09/23  0325 01/18/23  1331 12/05/22  1101   GLC 73 93   < >  --    CR  --   --   --  0.50*    < > = values in this interval not displayed.     CBC RESULTS: Recent Labs   Lab Test 01/26/23  1749   WBC 9.8   RBC 4.21   HGB 11.6*   HCT 35.4   MCV 84   MCH 27.6   MCHC 32.8   RDW 13.0              35 minutes spent on the date of the encounter doing chart review, history and exam, coordinating care/communication, documentation and further activities per the note.          To contact Endocrine Diabetes service:   From 8AM-4PM: page inpatient diabetes provider who is following the patient that day (see filed or  incomplete progress notes/consult notes).  If uncertain of provider assignment: page job code [id number]. (To page job code in-house dial 3 stars, [phone] then enter number).  For questions or updates AFTER HOURS from 4PM-8AM: page the diabetes job code for on call fellow: [phone]    Please notify inpatient diabetes service if changes are planned to steroids, nutrition, or if procedures are planned requiring prolonged NPO status.Diabetes Management Team job code: [phone]    MAGALI Gallegos, CNP  Inpatient Diabetes Management Service  Pager [phone]

## 2023-02-12 NOTE — PROGRESS NOTES
Antepartum Progress Note    S: Patient feeling well. No complaints. no vaginal bleeding, contractions. Still having leakage of clear fluid. normal FM. No fevers, chills, or abdominal pain.     O:   Patient Vitals for the past 24 hrs:   BP Temp Temp src Resp   23 1045 98/57 98.1  F (36.7  C) Oral 16   23 0553 97/52 98.1  F (36.7  C) Oral --   23 0214 94/52 97.9  F (36.6  C) Oral 16   23 2304 99/54 98.3  F (36.8  C) Oral 20   23 1542 101/57 98  F (36.7  C) Oral 20     Gen: Appears well, NAD  CV:  Regular rate  Pulm: non-labored breathing  Abd: Gravid, nontender     Weight:   Wt Readings from Last 2 Encounters:   23 70.9 kg (156 lb 4.8 oz)   23 71.8 kg (158 lb 3.2 oz)       FHT: 140 baseline, moderate variability, + accels, no decels  TOCO: 0 ctx in 10 minute period      A/P: Dario Nathan is a 27 year old  female at 27w1d, HD#18 admitted for PPROM Pregnancy notable for T2DM, HSV. Overall doing well    PPROM  - Wet prep +clue cells, s/p flagyl, GC/CT neg, UA neg  - MFM US () EFW 41%, repeat  oligo. Fluid check US 2023 shows MVP 2.8cm. Plan for repeat fluid check weekly, growth  showed EFW 13%ile   - s/p BMZ (-)  - s/p latency antibiotics   - S/p NICU consult  - fetus breech as of       Type 2 DM  - Lantus 9 unit(s) in AM, Novolog 1:7 cho, mSSI per endocrine  - s/p insulin gtt (-) during steroid course  - Endocrinology following closely, appreciate their management.      HSV  - Acyclovir 400mg PO TID for prophylaxis     PNC  - Rh pos, Rubella immune, GBS neg  - Tdap ordered for 28 wks     FWB  Appropriate for gestational age. Has had occasional decels over the past several days, monitoring this AM was without decels  - TID monitoring   - S/p BMZ -  - Magnesium if moving toward deliv <32 weeks  - NICU for delivery  - EFW 13%ile on  continue q 3 week growth US      Delivery Plan: expectant management to 34 weeks unless delivery  clinically indicated sooner    Patient seen and discussed with Dr. Berry.     Andrea Bell MD  OB/GYN PGY-3  2023 11:58 AM       Physician Attestation   I saw this patient with the resident and agree with the resident/fellow's findings and plan of care as documented in the note.      Key findings:  at 27w1d here with PPROM. Has been relatively stable with just a few fetal decels noted on Friday, none today. Still leaking clear fluid but no bleeding or contractions. Continue inpatient management and plan for BPP this week.     PAWEL BERRY MD  Date of Service (when I saw the patient): 23

## 2023-02-13 LAB
ABO/RH(D): NORMAL
ANTIBODY SCREEN: NEGATIVE
GLUCOSE BLDC GLUCOMTR-MCNC: 107 MG/DL (ref 70–99)
GLUCOSE BLDC GLUCOMTR-MCNC: 139 MG/DL (ref 70–99)
GLUCOSE BLDC GLUCOMTR-MCNC: 177 MG/DL (ref 70–99)
GLUCOSE BLDC GLUCOMTR-MCNC: 67 MG/DL (ref 70–99)
GLUCOSE BLDC GLUCOMTR-MCNC: 86 MG/DL (ref 70–99)
GLUCOSE BLDC GLUCOMTR-MCNC: 94 MG/DL (ref 70–99)
SPECIMEN EXPIRATION DATE: NORMAL

## 2023-02-13 PROCEDURE — 36415 COLL VENOUS BLD VENIPUNCTURE: CPT | Performed by: STUDENT IN AN ORGANIZED HEALTH CARE EDUCATION/TRAINING PROGRAM

## 2023-02-13 PROCEDURE — 250N000013 HC RX MED GY IP 250 OP 250 PS 637: Performed by: STUDENT IN AN ORGANIZED HEALTH CARE EDUCATION/TRAINING PROGRAM

## 2023-02-13 PROCEDURE — 120N000002 HC R&B MED SURG/OB UMMC

## 2023-02-13 PROCEDURE — 99231 SBSQ HOSP IP/OBS SF/LOW 25: CPT | Performed by: NURSE PRACTITIONER

## 2023-02-13 PROCEDURE — 99231 SBSQ HOSP IP/OBS SF/LOW 25: CPT | Performed by: OBSTETRICS & GYNECOLOGY

## 2023-02-13 PROCEDURE — 86901 BLOOD TYPING SEROLOGIC RH(D): CPT | Performed by: STUDENT IN AN ORGANIZED HEALTH CARE EDUCATION/TRAINING PROGRAM

## 2023-02-13 PROCEDURE — 250N000013 HC RX MED GY IP 250 OP 250 PS 637: Performed by: OBSTETRICS & GYNECOLOGY

## 2023-02-13 PROCEDURE — 86850 RBC ANTIBODY SCREEN: CPT | Performed by: STUDENT IN AN ORGANIZED HEALTH CARE EDUCATION/TRAINING PROGRAM

## 2023-02-13 RX ADMIN — ACYCLOVIR 400 MG: 400 TABLET ORAL at 14:29

## 2023-02-13 RX ADMIN — ACYCLOVIR 400 MG: 400 TABLET ORAL at 20:53

## 2023-02-13 RX ADMIN — PRENATAL VITAMINS-IRON FUMARATE 27 MG IRON-FOLIC ACID 0.8 MG TABLET 1 TABLET: at 20:53

## 2023-02-13 RX ADMIN — ACYCLOVIR 400 MG: 400 TABLET ORAL at 09:23

## 2023-02-13 RX ADMIN — SENNOSIDES AND DOCUSATE SODIUM 2 TABLET: 50; 8.6 TABLET ORAL at 09:23

## 2023-02-13 ASSESSMENT — ACTIVITIES OF DAILY LIVING (ADL)
ADLS_ACUITY_SCORE: 20

## 2023-02-13 NOTE — PROGRESS NOTES
Patient called out at 10:00 and reported pink discharge when wiping in bathroom. Small amount of pink discharge on toilet paper and in toilet. Patient denied any contractions or pain. Got to bed and put on monitor. No contractions traced and baby was reactive during monitoring. Updated resident during morning rounds, ok for patient to come off monitor after 30 minutes of a reactive tracing and with no contractions tracing. Patient taken off monitor and verbal agreement will let nurse know if there is any more bleeding.

## 2023-02-13 NOTE — PROGRESS NOTES
Diabetes Consult Daily  Progress Note          Assessment/Plan:     HPI:  Dario Nathan is a 27 year old with a past medical hx of T2DM who is a  at 24w5d by 6w0d US on day of admission 2023-- admitted for PPROM    Assessment:   1)  Type 2 Diabetes Mellitus; suboptimal control.  A1c 6.7%  2)  Acute hyperglycemia exacerbated by steroids- completed BMZ course -23    BG targets in pregnancy Less than 95 fasting, Less than 140 one hour post-meal, Less than 120 two hours post-meal, 0  blood sugars less than 60       Plan:     -  Lantus 9 units daily AM (0800) will continue-- note admin time has been later due to sleeping in    -  Novolog with meals: 1:6g CHO with breakfast, and 1:5.5g  CHO with lunch, dinner  and 1:6g CHO with snacks    -  Novolog moderate intensity sliding scale insulin TID AC >100 and HS >120 and 0200 correction     -  BG monitoring TID AC/HS/0200, with 2 hr PP--> accurate timing of this check is very helpful since there are not target standards for 2.5 or 3 hours post-prandial    -  Hypoglycemia protocol    -  Recommend carb counting protocol    -  Education :  TBD     -  Outpatient follow up:  recommend [facility] Endocrinology vs PCP, educators ROWENA Narayan RD, HENRRY, ESTEBAN Beasley RD, HENRRY, ESTEBAN     Discussed with RN, patient          Interval History:     BG trend reviewed.         BG near target.  She had lunch brought in from home yesterday.  No complaints or concerns.   Expectant management until 34 weeks, unless delivery clinically indicated sooner. Baby is breech.     Planned Procedures/surgeries: none  D5W-containing solutions/medications: none    Recent Labs   Lab 23  0205 23  2054 23  1845 23  1508 23  1316 23  1047   * 113* 99 155* 74 119*         Nutrition:     Orders Placed This Encounter      Regular Diet Adult        PTA Regimen:     Medications:   Levemir 16 unit(s) once daily   BG monitoring frequency:   2-4 times per day   BG trends at home: 70's - 150's            Review of Systems:   See interval hx         Medications:   Steroid planning:  BMZ course completed   Tube Feeding: no       Physical Exam:   BP 96/51 (BP Location: Left arm, Patient Position: Semi-Cardenas's, Cuff Size: Adult Regular)   Pulse 77   Temp 98.2  F (36.8  C) (Oral)   Resp 16   Wt 70.9 kg (156 lb 4.8 oz)   LMP 07/16/2022   SpO2 98%   BMI 28.59 kg/m       General: pleasant, in no distress. Walking around the room  HEENT: normocephalic, atraumatic. Oral mucous membranes moist.   Lungs: unlabored respiration, no cough  ABD: rounded, nondistended  Skin: warm and dry, no obvious lesions  MSK:  moves all extremities  Mental status:  alert, oriented to self, place, time  Psych:   calm and appropriate interaction            Data:     Lab Results   Component Value Date    A1C 6.7 10/11/2022    A1C 6.4 05/18/2022    A1C 5.8 03/19/2012        BMP  Recent Labs   Lab 02/13/23  0205 02/12/23  2054 02/12/23  1845 02/12/23  1508   * 113* 99 155*     Recent Labs   Lab Test 02/09/23  1038 02/09/23  0325 01/18/23  1331 12/05/22  1101   GLC 73 93   < >  --    CR  --   --   --  0.50*    < > = values in this interval not displayed.     CBC RESULTS: Recent Labs   Lab Test 01/26/23  1749   WBC 9.8   RBC 4.21   HGB 11.6*   HCT 35.4   MCV 84   MCH 27.6   MCHC 32.8   RDW 13.0              25 minutes spent on the date of the encounter doing chart review, history and exam, coordinating care/communication, documentation and further activities per the note.          To contact Endocrine Diabetes service:   From 8AM-4PM: page inpatient diabetes provider who is following the patient that day (see filed or incomplete progress notes/consult notes).  If uncertain of provider assignment: page job code [id number]. (To page job code in-house dial 3 stars, 777 then enter number).  For questions or updates AFTER HOURS from 4PM-8AM: page the diabetes job code for on  call fellow: [phone]    Please notify inpatient diabetes service if changes are planned to steroids, nutrition, or if procedures are planned requiring prolonged NPO status.Diabetes Management Team job code: [phone]    MAGALI Gallegos, CNP  Inpatient Diabetes Management Service  Pager [phone]

## 2023-02-14 ENCOUNTER — ANCILLARY PROCEDURE (OUTPATIENT)
Dept: ULTRASOUND IMAGING | Facility: CLINIC | Age: 28
End: 2023-02-14
Payer: COMMERCIAL

## 2023-02-14 ENCOUNTER — ANESTHESIA (OUTPATIENT)
Dept: ULTRASOUND IMAGING | Facility: CLINIC | Age: 28
End: 2023-02-14
Payer: COMMERCIAL

## 2023-02-14 ENCOUNTER — ANESTHESIA EVENT (OUTPATIENT)
Dept: ULTRASOUND IMAGING | Facility: CLINIC | Age: 28
End: 2023-02-14
Payer: COMMERCIAL

## 2023-02-14 LAB
ERYTHROCYTE [DISTWIDTH] IN BLOOD BY AUTOMATED COUNT: 13.3 % (ref 10–15)
FETAL RBC % LFV: 0 %
FETAL RBC (ML): 0 ML
GLUCOSE BLDC GLUCOMTR-MCNC: 100 MG/DL (ref 70–99)
GLUCOSE BLDC GLUCOMTR-MCNC: 117 MG/DL (ref 70–99)
GLUCOSE BLDC GLUCOMTR-MCNC: 146 MG/DL (ref 70–99)
GLUCOSE BLDC GLUCOMTR-MCNC: 162 MG/DL (ref 70–99)
GLUCOSE BLDC GLUCOMTR-MCNC: 60 MG/DL (ref 70–99)
GLUCOSE BLDC GLUCOMTR-MCNC: 77 MG/DL (ref 70–99)
GLUCOSE BLDC GLUCOMTR-MCNC: 80 MG/DL (ref 70–99)
HCT VFR BLD AUTO: 34 % (ref 35–47)
HGB BLD-MCNC: 11 G/DL (ref 11.7–15.7)
IF INDICATED RECOMMENDED DOSE OF RH IMMUNE GLOBULIN UG: 300 UG
MCH RBC QN AUTO: 27.2 PG (ref 26.5–33)
MCHC RBC AUTO-ENTMCNC: 32.4 G/DL (ref 31.5–36.5)
MCV RBC AUTO: 84 FL (ref 78–100)
PLATELET # BLD AUTO: 220 10E3/UL (ref 150–450)
RBC # BLD AUTO: 4.04 10E6/UL (ref 3.8–5.2)
WBC # BLD AUTO: 9 10E3/UL (ref 4–11)

## 2023-02-14 PROCEDURE — 710N000010 HC RECOVERY PHASE 1, LEVEL 2, PER MIN: Performed by: OBSTETRICS & GYNECOLOGY

## 2023-02-14 PROCEDURE — 250N000013 HC RX MED GY IP 250 OP 250 PS 637: Performed by: STUDENT IN AN ORGANIZED HEALTH CARE EDUCATION/TRAINING PROGRAM

## 2023-02-14 PROCEDURE — 250N000011 HC RX IP 250 OP 636: Performed by: STUDENT IN AN ORGANIZED HEALTH CARE EDUCATION/TRAINING PROGRAM

## 2023-02-14 PROCEDURE — 360N000076 HC SURGERY LEVEL 3, PER MIN: Performed by: OBSTETRICS & GYNECOLOGY

## 2023-02-14 PROCEDURE — 370N000003 HC ANESTHESIA WARD SERVICE

## 2023-02-14 PROCEDURE — 250N000011 HC RX IP 250 OP 636: Performed by: ANESTHESIOLOGY

## 2023-02-14 PROCEDURE — 272N000001 HC OR GENERAL SUPPLY STERILE: Performed by: OBSTETRICS & GYNECOLOGY

## 2023-02-14 PROCEDURE — 999N000141 HC STATISTIC PRE-PROCEDURE NURSING ASSESSMENT: Performed by: OBSTETRICS & GYNECOLOGY

## 2023-02-14 PROCEDURE — C9290 INJ, BUPIVACAINE LIPOSOME: HCPCS | Performed by: ANESTHESIOLOGY

## 2023-02-14 PROCEDURE — 250N000009 HC RX 250: Performed by: STUDENT IN AN ORGANIZED HEALTH CARE EDUCATION/TRAINING PROGRAM

## 2023-02-14 PROCEDURE — 88307 TISSUE EXAM BY PATHOLOGIST: CPT | Mod: 26 | Performed by: PATHOLOGY

## 2023-02-14 PROCEDURE — 120N000002 HC R&B MED SURG/OB UMMC

## 2023-02-14 PROCEDURE — 85027 COMPLETE CBC AUTOMATED: CPT | Performed by: OBSTETRICS & GYNECOLOGY

## 2023-02-14 PROCEDURE — 258N000003 HC RX IP 258 OP 636: Performed by: STUDENT IN AN ORGANIZED HEALTH CARE EDUCATION/TRAINING PROGRAM

## 2023-02-14 PROCEDURE — 258N000003 HC RX IP 258 OP 636: Performed by: OBSTETRICS & GYNECOLOGY

## 2023-02-14 PROCEDURE — 99233 SBSQ HOSP IP/OBS HIGH 50: CPT | Performed by: NURSE PRACTITIONER

## 2023-02-14 PROCEDURE — 88307 TISSUE EXAM BY PATHOLOGIST: CPT | Mod: TC | Performed by: STUDENT IN AN ORGANIZED HEALTH CARE EDUCATION/TRAINING PROGRAM

## 2023-02-14 PROCEDURE — 250N000009 HC RX 250

## 2023-02-14 PROCEDURE — 271N000001 HC OR GENERAL SUPPLY NON-STERILE: Performed by: OBSTETRICS & GYNECOLOGY

## 2023-02-14 PROCEDURE — 36415 COLL VENOUS BLD VENIPUNCTURE: CPT | Performed by: OBSTETRICS & GYNECOLOGY

## 2023-02-14 PROCEDURE — 59510 CESAREAN DELIVERY: CPT | Mod: GC | Performed by: OBSTETRICS & GYNECOLOGY

## 2023-02-14 PROCEDURE — 370N000017 HC ANESTHESIA TECHNICAL FEE, PER MIN: Performed by: OBSTETRICS & GYNECOLOGY

## 2023-02-14 PROCEDURE — 85460 HEMOGLOBIN FETAL: CPT | Performed by: OBSTETRICS & GYNECOLOGY

## 2023-02-14 RX ORDER — DEXTROSE MONOHYDRATE 100 MG/ML
INJECTION, SOLUTION INTRAVENOUS CONTINUOUS PRN
Status: DISCONTINUED | OUTPATIENT
Start: 2023-02-14 | End: 2023-02-17 | Stop reason: HOSPADM

## 2023-02-14 RX ORDER — OXYCODONE HYDROCHLORIDE 5 MG/1
10 TABLET ORAL EVERY 4 HOURS PRN
Status: DISCONTINUED | OUTPATIENT
Start: 2023-02-14 | End: 2023-02-14 | Stop reason: HOSPADM

## 2023-02-14 RX ORDER — TRANEXAMIC ACID 10 MG/ML
1 INJECTION, SOLUTION INTRAVENOUS EVERY 30 MIN PRN
Status: DISCONTINUED | OUTPATIENT
Start: 2023-02-14 | End: 2023-02-17 | Stop reason: HOSPADM

## 2023-02-14 RX ORDER — METOCLOPRAMIDE 10 MG/1
10 TABLET ORAL EVERY 6 HOURS PRN
Status: DISCONTINUED | OUTPATIENT
Start: 2023-02-14 | End: 2023-02-17 | Stop reason: HOSPADM

## 2023-02-14 RX ORDER — SIMETHICONE 80 MG
80 TABLET,CHEWABLE ORAL 4 TIMES DAILY PRN
Status: DISCONTINUED | OUTPATIENT
Start: 2023-02-14 | End: 2023-02-17 | Stop reason: HOSPADM

## 2023-02-14 RX ORDER — MISOPROSTOL 200 UG/1
800 TABLET ORAL
Status: DISCONTINUED | OUTPATIENT
Start: 2023-02-14 | End: 2023-02-17 | Stop reason: HOSPADM

## 2023-02-14 RX ORDER — MISOPROSTOL 200 UG/1
400 TABLET ORAL
Status: DISCONTINUED | OUTPATIENT
Start: 2023-02-14 | End: 2023-02-17 | Stop reason: HOSPADM

## 2023-02-14 RX ORDER — METHYLERGONOVINE MALEATE 0.2 MG/ML
INJECTION INTRAVENOUS PRN
Status: DISCONTINUED | OUTPATIENT
Start: 2023-02-14 | End: 2023-02-14

## 2023-02-14 RX ORDER — NALOXONE HYDROCHLORIDE 0.4 MG/ML
0.2 INJECTION, SOLUTION INTRAMUSCULAR; INTRAVENOUS; SUBCUTANEOUS
Status: DISCONTINUED | OUTPATIENT
Start: 2023-02-14 | End: 2023-02-17 | Stop reason: HOSPADM

## 2023-02-14 RX ORDER — ONDANSETRON 4 MG/1
4 TABLET, ORALLY DISINTEGRATING ORAL EVERY 6 HOURS PRN
Status: DISCONTINUED | OUTPATIENT
Start: 2023-02-14 | End: 2023-02-17 | Stop reason: HOSPADM

## 2023-02-14 RX ORDER — DEXTROSE MONOHYDRATE 25 G/50ML
25-50 INJECTION, SOLUTION INTRAVENOUS
Status: DISCONTINUED | OUTPATIENT
Start: 2023-02-14 | End: 2023-02-14

## 2023-02-14 RX ORDER — FENTANYL CITRATE 50 UG/ML
50 INJECTION, SOLUTION INTRAMUSCULAR; INTRAVENOUS EVERY 5 MIN PRN
Status: DISCONTINUED | OUTPATIENT
Start: 2023-02-14 | End: 2023-02-14 | Stop reason: HOSPADM

## 2023-02-14 RX ORDER — FENTANYL CITRATE 50 UG/ML
25 INJECTION, SOLUTION INTRAMUSCULAR; INTRAVENOUS EVERY 5 MIN PRN
Status: DISCONTINUED | OUTPATIENT
Start: 2023-02-14 | End: 2023-02-14 | Stop reason: HOSPADM

## 2023-02-14 RX ORDER — METHYLERGONOVINE MALEATE 0.2 MG/ML
INJECTION INTRAVENOUS
Status: COMPLETED
Start: 2023-02-14 | End: 2023-02-14

## 2023-02-14 RX ORDER — OXYCODONE HYDROCHLORIDE 5 MG/1
5 TABLET ORAL EVERY 4 HOURS PRN
Status: DISCONTINUED | OUTPATIENT
Start: 2023-02-14 | End: 2023-02-14 | Stop reason: HOSPADM

## 2023-02-14 RX ORDER — OXYTOCIN/0.9 % SODIUM CHLORIDE 30/500 ML
PLASTIC BAG, INJECTION (ML) INTRAVENOUS
Status: COMPLETED
Start: 2023-02-14 | End: 2023-02-14

## 2023-02-14 RX ORDER — METOPROLOL TARTRATE 1 MG/ML
1-2 INJECTION, SOLUTION INTRAVENOUS EVERY 5 MIN PRN
Status: DISCONTINUED | OUTPATIENT
Start: 2023-02-14 | End: 2023-02-14 | Stop reason: HOSPADM

## 2023-02-14 RX ORDER — DEXTROSE MONOHYDRATE 25 G/50ML
25-50 INJECTION, SOLUTION INTRAVENOUS
Status: DISCONTINUED | OUTPATIENT
Start: 2023-02-14 | End: 2023-02-17 | Stop reason: HOSPADM

## 2023-02-14 RX ORDER — BISACODYL 10 MG
10 SUPPOSITORY, RECTAL RECTAL DAILY PRN
Status: DISCONTINUED | OUTPATIENT
Start: 2023-02-16 | End: 2023-02-17 | Stop reason: HOSPADM

## 2023-02-14 RX ORDER — MAGNESIUM SULFATE HEPTAHYDRATE 40 MG/ML
INJECTION, SOLUTION INTRAVENOUS PRN
Status: DISCONTINUED | OUTPATIENT
Start: 2023-02-14 | End: 2023-02-14

## 2023-02-14 RX ORDER — OXYCODONE HYDROCHLORIDE 5 MG/1
5 TABLET ORAL EVERY 4 HOURS PRN
Status: DISCONTINUED | OUTPATIENT
Start: 2023-02-14 | End: 2023-02-17 | Stop reason: HOSPADM

## 2023-02-14 RX ORDER — MAGNESIUM SULFATE HEPTAHYDRATE 40 MG/ML
INJECTION, SOLUTION INTRAVENOUS
Status: DISCONTINUED
Start: 2023-02-14 | End: 2023-02-14 | Stop reason: HOSPADM

## 2023-02-14 RX ORDER — CARBOPROST TROMETHAMINE 250 UG/ML
250 INJECTION, SOLUTION INTRAMUSCULAR
Status: DISCONTINUED | OUTPATIENT
Start: 2023-02-14 | End: 2023-02-17 | Stop reason: HOSPADM

## 2023-02-14 RX ORDER — DIPHENHYDRAMINE HCL 25 MG
25 CAPSULE ORAL EVERY 6 HOURS PRN
Status: DISCONTINUED | OUTPATIENT
Start: 2023-02-14 | End: 2023-02-17 | Stop reason: HOSPADM

## 2023-02-14 RX ORDER — SODIUM CHLORIDE, SODIUM LACTATE, POTASSIUM CHLORIDE, CALCIUM CHLORIDE 600; 310; 30; 20 MG/100ML; MG/100ML; MG/100ML; MG/100ML
INJECTION, SOLUTION INTRAVENOUS CONTINUOUS PRN
Status: DISCONTINUED | OUTPATIENT
Start: 2023-02-14 | End: 2023-02-14

## 2023-02-14 RX ORDER — OXYTOCIN/0.9 % SODIUM CHLORIDE 30/500 ML
340 PLASTIC BAG, INJECTION (ML) INTRAVENOUS CONTINUOUS PRN
Status: DISCONTINUED | OUTPATIENT
Start: 2023-02-14 | End: 2023-02-17 | Stop reason: HOSPADM

## 2023-02-14 RX ORDER — ACETAMINOPHEN 325 MG/1
975 TABLET ORAL EVERY 6 HOURS
Status: DISCONTINUED | OUTPATIENT
Start: 2023-02-14 | End: 2023-02-17 | Stop reason: HOSPADM

## 2023-02-14 RX ORDER — SODIUM CHLORIDE, SODIUM LACTATE, POTASSIUM CHLORIDE, CALCIUM CHLORIDE 600; 310; 30; 20 MG/100ML; MG/100ML; MG/100ML; MG/100ML
INJECTION, SOLUTION INTRAVENOUS CONTINUOUS
Status: DISCONTINUED | OUTPATIENT
Start: 2023-02-14 | End: 2023-02-14 | Stop reason: HOSPADM

## 2023-02-14 RX ORDER — NICOTINE POLACRILEX 4 MG
15-30 LOZENGE BUCCAL
Status: DISCONTINUED | OUTPATIENT
Start: 2023-02-14 | End: 2023-02-17 | Stop reason: HOSPADM

## 2023-02-14 RX ORDER — FENTANYL CITRATE 50 UG/ML
INJECTION, SOLUTION INTRAMUSCULAR; INTRAVENOUS PRN
Status: DISCONTINUED | OUTPATIENT
Start: 2023-02-14 | End: 2023-02-14

## 2023-02-14 RX ORDER — ONDANSETRON 4 MG/1
4 TABLET, ORALLY DISINTEGRATING ORAL EVERY 30 MIN PRN
Status: DISCONTINUED | OUTPATIENT
Start: 2023-02-14 | End: 2023-02-14 | Stop reason: HOSPADM

## 2023-02-14 RX ORDER — NALOXONE HYDROCHLORIDE 0.4 MG/ML
0.4 INJECTION, SOLUTION INTRAMUSCULAR; INTRAVENOUS; SUBCUTANEOUS
Status: DISCONTINUED | OUTPATIENT
Start: 2023-02-14 | End: 2023-02-17 | Stop reason: HOSPADM

## 2023-02-14 RX ORDER — ONDANSETRON 2 MG/ML
4 INJECTION INTRAMUSCULAR; INTRAVENOUS EVERY 6 HOURS PRN
Status: DISCONTINUED | OUTPATIENT
Start: 2023-02-14 | End: 2023-02-17 | Stop reason: HOSPADM

## 2023-02-14 RX ORDER — SODIUM CHLORIDE, SODIUM LACTATE, POTASSIUM CHLORIDE, CALCIUM CHLORIDE 600; 310; 30; 20 MG/100ML; MG/100ML; MG/100ML; MG/100ML
INJECTION, SOLUTION INTRAVENOUS CONTINUOUS
Status: DISCONTINUED | OUTPATIENT
Start: 2023-02-14 | End: 2023-02-14

## 2023-02-14 RX ORDER — NICOTINE POLACRILEX 4 MG
15-30 LOZENGE BUCCAL
Status: DISCONTINUED | OUTPATIENT
Start: 2023-02-14 | End: 2023-02-14

## 2023-02-14 RX ORDER — METOCLOPRAMIDE HYDROCHLORIDE 5 MG/ML
10 INJECTION INTRAMUSCULAR; INTRAVENOUS EVERY 6 HOURS PRN
Status: DISCONTINUED | OUTPATIENT
Start: 2023-02-14 | End: 2023-02-17 | Stop reason: HOSPADM

## 2023-02-14 RX ORDER — PROPOFOL 10 MG/ML
INJECTION, EMULSION INTRAVENOUS CONTINUOUS PRN
Status: DISCONTINUED | OUTPATIENT
Start: 2023-02-14 | End: 2023-02-14

## 2023-02-14 RX ORDER — HYDROCORTISONE 25 MG/G
CREAM TOPICAL 3 TIMES DAILY PRN
Status: DISCONTINUED | OUTPATIENT
Start: 2023-02-14 | End: 2023-02-17 | Stop reason: HOSPADM

## 2023-02-14 RX ORDER — HYDROMORPHONE HCL IN WATER/PF 6 MG/30 ML
0.2 PATIENT CONTROLLED ANALGESIA SYRINGE INTRAVENOUS EVERY 5 MIN PRN
Status: DISCONTINUED | OUTPATIENT
Start: 2023-02-14 | End: 2023-02-14 | Stop reason: HOSPADM

## 2023-02-14 RX ORDER — AMOXICILLIN 250 MG
2 CAPSULE ORAL 2 TIMES DAILY
Status: DISCONTINUED | OUTPATIENT
Start: 2023-02-14 | End: 2023-02-17 | Stop reason: HOSPADM

## 2023-02-14 RX ORDER — LIDOCAINE 40 MG/G
CREAM TOPICAL
Status: DISCONTINUED | OUTPATIENT
Start: 2023-02-14 | End: 2023-02-17 | Stop reason: HOSPADM

## 2023-02-14 RX ORDER — DIPHENHYDRAMINE HYDROCHLORIDE 50 MG/ML
25 INJECTION INTRAMUSCULAR; INTRAVENOUS EVERY 6 HOURS PRN
Status: DISCONTINUED | OUTPATIENT
Start: 2023-02-14 | End: 2023-02-17 | Stop reason: HOSPADM

## 2023-02-14 RX ORDER — HYDRALAZINE HYDROCHLORIDE 20 MG/ML
2.5-5 INJECTION INTRAMUSCULAR; INTRAVENOUS EVERY 10 MIN PRN
Status: DISCONTINUED | OUTPATIENT
Start: 2023-02-14 | End: 2023-02-14 | Stop reason: HOSPADM

## 2023-02-14 RX ORDER — PROCHLORPERAZINE MALEATE 10 MG
10 TABLET ORAL EVERY 6 HOURS PRN
Status: DISCONTINUED | OUTPATIENT
Start: 2023-02-14 | End: 2023-02-17 | Stop reason: HOSPADM

## 2023-02-14 RX ORDER — DEXTROSE, SODIUM CHLORIDE, SODIUM LACTATE, POTASSIUM CHLORIDE, AND CALCIUM CHLORIDE 5; .6; .31; .03; .02 G/100ML; G/100ML; G/100ML; G/100ML; G/100ML
INJECTION, SOLUTION INTRAVENOUS CONTINUOUS
Status: DISCONTINUED | OUTPATIENT
Start: 2023-02-14 | End: 2023-02-17 | Stop reason: HOSPADM

## 2023-02-14 RX ORDER — ONDANSETRON 2 MG/ML
4 INJECTION INTRAMUSCULAR; INTRAVENOUS EVERY 30 MIN PRN
Status: DISCONTINUED | OUTPATIENT
Start: 2023-02-14 | End: 2023-02-14 | Stop reason: HOSPADM

## 2023-02-14 RX ORDER — OXYTOCIN/0.9 % SODIUM CHLORIDE 30/500 ML
PLASTIC BAG, INJECTION (ML) INTRAVENOUS CONTINUOUS PRN
Status: DISCONTINUED | OUTPATIENT
Start: 2023-02-14 | End: 2023-02-14

## 2023-02-14 RX ORDER — HYDROMORPHONE HCL IN WATER/PF 6 MG/30 ML
0.4 PATIENT CONTROLLED ANALGESIA SYRINGE INTRAVENOUS EVERY 5 MIN PRN
Status: DISCONTINUED | OUTPATIENT
Start: 2023-02-14 | End: 2023-02-14 | Stop reason: HOSPADM

## 2023-02-14 RX ORDER — PROCHLORPERAZINE 25 MG
25 SUPPOSITORY, RECTAL RECTAL EVERY 12 HOURS PRN
Status: DISCONTINUED | OUTPATIENT
Start: 2023-02-14 | End: 2023-02-17 | Stop reason: HOSPADM

## 2023-02-14 RX ORDER — BUPIVACAINE HYDROCHLORIDE 2.5 MG/ML
INJECTION, SOLUTION EPIDURAL; INFILTRATION; INTRACAUDAL
Status: DISCONTINUED | OUTPATIENT
Start: 2023-02-14 | End: 2023-02-14

## 2023-02-14 RX ORDER — METHYLERGONOVINE MALEATE 0.2 MG/ML
200 INJECTION INTRAVENOUS
Status: DISCONTINUED | OUTPATIENT
Start: 2023-02-14 | End: 2023-02-17 | Stop reason: HOSPADM

## 2023-02-14 RX ORDER — MODIFIED LANOLIN
OINTMENT (GRAM) TOPICAL
Status: DISCONTINUED | OUTPATIENT
Start: 2023-02-14 | End: 2023-02-17 | Stop reason: HOSPADM

## 2023-02-14 RX ORDER — IBUPROFEN 800 MG/1
800 TABLET, FILM COATED ORAL EVERY 6 HOURS
Status: DISCONTINUED | OUTPATIENT
Start: 2023-02-15 | End: 2023-02-17 | Stop reason: HOSPADM

## 2023-02-14 RX ORDER — KETOROLAC TROMETHAMINE 30 MG/ML
30 INJECTION, SOLUTION INTRAMUSCULAR; INTRAVENOUS EVERY 6 HOURS
Status: COMPLETED | OUTPATIENT
Start: 2023-02-14 | End: 2023-02-15

## 2023-02-14 RX ORDER — OXYTOCIN 10 [USP'U]/ML
10 INJECTION, SOLUTION INTRAMUSCULAR; INTRAVENOUS
Status: DISCONTINUED | OUTPATIENT
Start: 2023-02-14 | End: 2023-02-17 | Stop reason: HOSPADM

## 2023-02-14 RX ORDER — CEFAZOLIN SODIUM 1 G/3ML
INJECTION, POWDER, FOR SOLUTION INTRAMUSCULAR; INTRAVENOUS PRN
Status: DISCONTINUED | OUTPATIENT
Start: 2023-02-14 | End: 2023-02-14

## 2023-02-14 RX ORDER — AMOXICILLIN 250 MG
1 CAPSULE ORAL 2 TIMES DAILY
Status: DISCONTINUED | OUTPATIENT
Start: 2023-02-14 | End: 2023-02-17 | Stop reason: HOSPADM

## 2023-02-14 RX ADMIN — Medication 600 ML/HR: at 13:36

## 2023-02-14 RX ADMIN — FENTANYL CITRATE 25 MCG: 50 INJECTION, SOLUTION INTRAMUSCULAR; INTRAVENOUS at 14:55

## 2023-02-14 RX ADMIN — PHENYLEPHRINE HYDROCHLORIDE 50 MCG/MIN: 10 INJECTION INTRAVENOUS at 13:37

## 2023-02-14 RX ADMIN — BUPIVACAINE HYDROCHLORIDE 20 ML: 2.5 INJECTION, SOLUTION EPIDURAL; INFILTRATION; INTRACAUDAL at 14:57

## 2023-02-14 RX ADMIN — SODIUM CHLORIDE, POTASSIUM CHLORIDE, SODIUM LACTATE AND CALCIUM CHLORIDE: 600; 310; 30; 20 INJECTION, SOLUTION INTRAVENOUS at 13:37

## 2023-02-14 RX ADMIN — TRANEXAMIC ACID 1 G: 1 INJECTION, SOLUTION INTRAVENOUS at 13:38

## 2023-02-14 RX ADMIN — FENTANYL CITRATE 100 MCG: 50 INJECTION, SOLUTION INTRAMUSCULAR; INTRAVENOUS at 13:35

## 2023-02-14 RX ADMIN — ACETAMINOPHEN 975 MG: 325 TABLET ORAL at 18:22

## 2023-02-14 RX ADMIN — SODIUM CHLORIDE, POTASSIUM CHLORIDE, SODIUM LACTATE AND CALCIUM CHLORIDE: 600; 310; 30; 20 INJECTION, SOLUTION INTRAVENOUS at 13:42

## 2023-02-14 RX ADMIN — SODIUM CHLORIDE, POTASSIUM CHLORIDE, SODIUM LACTATE AND CALCIUM CHLORIDE: 600; 310; 30; 20 INJECTION, SOLUTION INTRAVENOUS at 12:56

## 2023-02-14 RX ADMIN — SUCCINYLCHOLINE CHLORIDE 120 MG: 20 INJECTION, SOLUTION INTRAMUSCULAR; INTRAVENOUS; PARENTERAL at 13:33

## 2023-02-14 RX ADMIN — PROPOFOL 100 MCG/KG/MIN: 10 INJECTION, EMULSION INTRAVENOUS at 13:35

## 2023-02-14 RX ADMIN — KETOROLAC TROMETHAMINE 30 MG: 30 INJECTION, SOLUTION INTRAMUSCULAR; INTRAVENOUS at 14:43

## 2023-02-14 RX ADMIN — BUPIVACAINE 20 ML: 13.3 INJECTION, SUSPENSION, LIPOSOMAL INFILTRATION at 14:57

## 2023-02-14 RX ADMIN — METHYLERGONOVINE MALEATE 200 MCG: 0.2 INJECTION INTRAVENOUS at 13:39

## 2023-02-14 RX ADMIN — PROPOFOL 200 MCG/KG/MIN: 10 INJECTION, EMULSION INTRAVENOUS at 13:33

## 2023-02-14 RX ADMIN — MAGNESIUM SULFATE HEPTAHYDRATE 4 G: 40 INJECTION, SOLUTION INTRAVENOUS at 13:33

## 2023-02-14 RX ADMIN — SENNOSIDES AND DOCUSATE SODIUM 2 TABLET: 50; 8.6 TABLET ORAL at 20:23

## 2023-02-14 RX ADMIN — FENTANYL CITRATE 25 MCG: 50 INJECTION, SOLUTION INTRAMUSCULAR; INTRAVENOUS at 15:00

## 2023-02-14 RX ADMIN — Medication 30 UNITS: at 15:39

## 2023-02-14 RX ADMIN — KETOROLAC TROMETHAMINE 30 MG: 30 INJECTION, SOLUTION INTRAMUSCULAR; INTRAVENOUS at 20:23

## 2023-02-14 RX ADMIN — ACYCLOVIR 400 MG: 400 TABLET ORAL at 10:17

## 2023-02-14 RX ADMIN — CEFAZOLIN 2 G: 1 INJECTION, POWDER, FOR SOLUTION INTRAMUSCULAR; INTRAVENOUS at 13:31

## 2023-02-14 RX ADMIN — MIDAZOLAM 2 MG: 1 INJECTION INTRAMUSCULAR; INTRAVENOUS at 13:35

## 2023-02-14 ASSESSMENT — ACTIVITIES OF DAILY LIVING (ADL)
ADLS_ACUITY_SCORE: 20

## 2023-02-14 NOTE — PROGRESS NOTES
[facility] ANTEPARTUM PROGRESS NOTE:   2023          SUBJECTIVE:   Patient reports passing 3 small pink tinged globs of mucus in the toilet and with wiping. No bright red blood. No cramping or pain. Active fetal movement. No contractions.          OBJECTIVE:     Vitals:    23 0614 23 0925 23 1436 23 2110   BP:  104/55 95/54 110/53   BP Location:  Left arm Left arm Left arm   Patient Position:  Semi-Cardenas's Semi-Cardenas's Semi-Cardenas's   Cuff Size:  Adult Regular Adult Regular Adult Regular   Pulse:       Resp:  18 18 18   Temp:  97.9  F (36.6  C) 98.1  F (36.7  C) 98.3  F (36.8  C)   TempSrc:  Oral Oral Oral   SpO2:    99%   Weight: 70.1 kg (154 lb 8 oz)          NST:  Fetal Heart Rate Tracinbpm baseline, mod suleman, + accels, rare variable decel  Appropriate for gestational age    Tocometer: No contractions    Alert, oriented  No increased work of breathing  Abdomen gravid and nontender  No edema        Recent Results (from the past 24 hour(s))   Glucose by meter    Collection Time: 23  2:05 AM   Result Value Ref Range    GLUCOSE BY METER POCT 107 (H) 70 - 99 mg/dL   Glucose by meter    Collection Time: 23  6:22 AM   Result Value Ref Range    GLUCOSE BY METER POCT 86 70 - 99 mg/dL   Adult Type and Screen    Collection Time: 23 10:16 AM   Result Value Ref Range    ABO/RH(D) O POS     Antibody Screen Negative Negative    SPECIMEN EXPIRATION DATE [id number]    Glucose by meter    Collection Time: 23 11:52 AM   Result Value Ref Range    GLUCOSE BY METER POCT 94 70 - 99 mg/dL   Glucose by meter    Collection Time: 23  4:14 PM   Result Value Ref Range    GLUCOSE BY METER POCT 67 (L) 70 - 99 mg/dL   Glucose by meter    Collection Time: 23  6:28 PM   Result Value Ref Range    GLUCOSE BY METER POCT 177 (H) 70 - 99 mg/dL   Glucose by meter    Collection Time: 23  8:56 PM   Result Value Ref Range    GLUCOSE BY METER POCT 139 (H) 70 - 99  mg/dL                ASSESSMENT/PLAN:    27 year old  27w2d on hospital day 19 admitted for PPROM Pregnancy notable for T2DM, HSV. small amt of pink mucus and few decels on monitoring. No s/sx labor, infection, abruption or fetal distress     PPROM  - Wet prep +clue cells, s/p flagyl, GC/CT neg, UA neg  - MFM US () EFW 41%, repeat  oligo. Fluid check US 2023 shows MVP 2.8cm. Plan for repeat fluid check weekly, growth  showed EFW 13%ile   - s/p BMZ (-)  - s/p latency antibiotics   - S/p NICU consult  - fetus breech as of       Type 2 DM  - Lantus 9 unit(s) in AM, Novolog 1:7 cho, mSSI per endocrine  - s/p insulin gtt (-) during steroid course  - Endocrinology following closely, appreciate their management.      HSV  - Acyclovir 400mg PO TID for prophylaxis     PNC  - Rh pos, Rubella immune, GBS neg  - Tdap ordered for 28 wks     FWB  Appropriate for gestational age.   - TID monitoring   - S/p BMZ -  - Magnesium if moving toward deliv <32 weeks  - NICU for delivery  - EFW 13%ile on  continue q 3 week growth US      Delivery Plan: expectant management to 34 weeks unless delivery clinically indicated sooner       Erin Rivas MD

## 2023-02-14 NOTE — BRIEF OP NOTE
Brief Operative Note    Name: Dario Nathan  MRN: [id number]  : 1995  Date of Surgery: 2023    Pre-operative Diagnosis:  - IUP @ 27w3d  - PPROM  - Category II FHT  - Vaginal bleeding c/f abruption  - Type 2 DM    Post-operative Diagnosis:  - Postpartum s/p delivery of viable male infant  - Placental abruption    Procedure(s):   - STAT primary low transverse  section with single layer uterine closure via Pfannenstiel skin incision    Surgeon:  - Dr. Maki  - Dr. Zaidi    Assistants:  - Doris Sams MD, PGY3    Anesthesia: GETA  EBL: 600 mL  UOP: 100 mL clear urine  Additional Medications: 2g Ancef, 1g TXA, 0.2mg Methergine  Specimens: Cord blood, cord gases, placenta  Complications: None apparent    Findings:   - Viable male infant delivered at 1335 on 2023 with APGARs pending, weight pending.   - Cord gases: arterial pH 7.24, base excess -4.1, venous pH 7.27, base excess -3.6  - Scant bloody amniotic fluid and moderate clot immediately upon hysterotomy, consistent with placental abruption. Otherwise normal appearing placenta, removed intact.   - Normal uterus, tubes, and ovaries.   - No adhesions.   - Surgical sites noted to be hemostatic at the end of case.     Doris Sams MD  ObGyn, PGY-3  2023 2:07 PM

## 2023-02-14 NOTE — OP NOTE
[facility]  Operative Note    Name: Dario Nathan  MRN: [id number]  : 1995  Date of Surgery: 2023     Pre-operative Diagnosis:  - IUP @ 27w3d  - PPROM  - Category II FHT  - Vaginal bleeding c/f abruption  - Type 2 DM     Post-operative Diagnosis:  - Postpartum s/p delivery of viable male infant  - Placental abruption     Procedure(s):   - STAT primary low transverse  section with single layer uterine closure via Pfannenstiel skin incision     Surgeon:  - Dr. Anna Maki    Assistants:  -Dr. Amee Zaidi  - Doris Sams MD, PGY3     Anesthesia: GETA  EBL: 600 mL  UOP: 100 mL clear urine  Additional Medications: 2g Ancef, 1g TXA, 0.2mg Methergine  Specimens: Cord blood, cord gases, placenta  Complications: None apparent     Findings:   - Viable male infant delivered at 1335 on 2023 with APGARs pending, weight pending.   - Cord gases: arterial pH 7.24, base excess -4.1, venous pH 7.27, base excess -3.6  - Scant bloody amniotic fluid and moderate clot immediately upon hysterotomy, consistent with placental abruption. Otherwise normal appearing placenta, removed intact.   - Normal uterus, tubes, and ovaries.   - No adhesions.   - Surgical sites noted to be hemostatic at the end of case.     Indications: Dario Nathan is a 27 year old now  who presented at 24w5d with PPROM. She was admitted to the antepartum service, received a course of BMZ, and remained clinically stable until HD#29, when she developed acute vaginal bleeding and a category II FHT with significant deep variable decelerations concerning for placental abruption. A STAT  section was called. Risks and benefits were reviewed and the patient consented for the procedure.    Procedure Details: The patient was taken back to the operating room where she underwent general endotracheal anesthesia. She was prepped and draped in STAT sterile fashion with betadine in the dorsal  supine position with a leftward tilt. A safety patient time out was not performed due to the emergent nature of the procedure. 2 gm Ancef was administered. A 4 gram magnesium bolus was requested for fetal neuro protection but was unable to be administered prior to delivery. A Pfannenstiel skin incision was made with the scalpel and carried through the underlying layer to the fascia. The fascia was incised in the midline and extended laterally with blunt dissection. The rectus muscles were  in the midline. The peritoneum was identified and entered bluntly. This was extended with blunt dissection. The bladder blade was inserted. The lower uterine segment was incised in a transverse fashion with the scalpel. The incision was extended digitally. The bladder blade was removed. The infant was noted to be cephalic and was delivered atraumatically.The cord was cut, and the infant was handed off to the waiting NICU staff.  A segment of cord was taken for cord gases. The placenta was removed with gentle traction on the cord and fundal message. The uterus was exteriorized and cleared of all clots and debris. The uterine incision was repaired with 0-Vicryl in a running locked fashion. Uterine atony was controlled with fundal massage, pitocin, and methergine. The posterior cul-de-sac was cleared of clots and debris. The uterus was returned to the abdomen and noted to be hemostatic. The gutters were cleared of clots. A kocher clamp was used to elevated the fascia superiorly and an area of bleeding on the right rectus muscle was noted, hemostasis obtained with a figure of eight stitch using 3-0 plain gut suture. The fascia was closed with 0-vicryl in a running fashion. The subcutaneous tissue was irrigated and areas of bleeding were controlled with cautery. The subcutaneous tissue was closed with 3-0 plain gut suture. The skin was closed with 4-0 monocryl. The patient tolerated the procedure well and was taken to the  recovery room in stable condition. All lap, instrument, and sharps counts were correct times two. Dr. Maki was scrubbed and present for the procedure.     Doris Sams MD  ObGyn, PGY-3  02/14/2023 3:55 PM    OB/GYN Attending Procedure Attestation     I was scrubbed and present for the entire procedure and agree with the resident documentation. Dr. Zaidi was present from the start of the case through the uterine closure. Her assistance was necessary given the emergent nature of the procedure due to fetal bradycardia and placental abruption. She actively assistant with retraction and obtaining adequate visualization throughout the first portion of the procedure.     Anna Maki MD

## 2023-02-14 NOTE — PROVIDER NOTIFICATION
02/14/23 0940   Provider Notification   Provider Name/Title Dr. Sams   Method of Notification Electronic Page   Request Evaluate in Person   Notification Reason Status Update  (Pt. up to bathroom this morning and had bright red blood in toilet. No clots and pad is still light pink, mucuous dicharge same as yesterday. Pt. denies contractions. Placed on monitor and placing IV.)     Dr. Sams came to bedside shortly after page to see patient. Plan is to continue monitoring and keep NPO for now. Updated at 10:30 am while doing rounds that patient has now felt 2 contractions while on monitor. Palpates mild and patient reports tightening and mild/period like cramping. No additional bleeding at this time.

## 2023-02-14 NOTE — PROGRESS NOTES
Per previous RN, pt off unit visiting with family. Patient will notify nursing staff once she returns to the floor.

## 2023-02-14 NOTE — PROGRESS NOTES
Delayed antepartum note-patient seen and examined at 12:40 pm.     Per RN patient had increased bleeding this morning and some mild contractions, about 10-15 min apart. The patient reports that they are mild in nature. Scant amount of red blood on peripad since this morning but moderate amount of blood in toilet when patient was up to void (difficult to quantify as the blood was diluted in the toilet bowl). Abdomen was soft and non-tender. FHTs were cat II with rare variable decel and normal baseline/moderate variability. BSUS performed and fetus now cephalic.     We discussed that the increase in bleeding could be a sign of labor or placental abruption and that we would recommend close monitoring this morning. IV currently in place and patient on continuous monitoring. She is NPO and maintenance fluids ordered. KB and repeat CBC ordered, Type and screen up to date. We discussed that if she is laboring, as long as maternal and fetal status remains reassuring she would be able to attempt a vaginal delivery as she is now cephalic but heavy vaginal bleeding or fetal distress would be an indication for emergent . Would plan for magnesium bolus for neuro protection and could also consider a rescue course of steroids if moving towards delivery.     Dispo: continue close monitoring on antepartum unit.     Anna Maki MD

## 2023-02-14 NOTE — PROGRESS NOTES
OBGYN Progress Note    Delayed entry due to acute patient cares. Notified by RN this morning at 0940 of vaginal bleeding when patient up to the bathroom. Went in to evaluate patient at that time. Per her report, she got up to empty her bladder and noticed bright red blood in the toilet, difficult to tell amount. She had some very light pinkish discharge yesterday but otherwise no bleeding. On exam, pt appeared comfortable, VS normal, no abd tenderness. FHT and toco placed, FHT reassuring at that time. Pt reported 2 contractions/cramps but none further. Discussed that PPROM is a risk factor for placental abruption, but bleeding could also be due to cervical change. Pt not feeling ctx at that time and no further bleeding so speculum exam deferred. Discussed plan to remain NPO with EFM on.    Noted at 1320 that pt had sudden recurrent deep variable decels on monitoring with fernanda to 50s. Pt consented for SVE, which was performed and cervix noted to be 1cm dilated, no palpable umbilical cord but brisk bright red bleeding noted. Due to concern for acute abruption and fetal bradycardia, an OB STAT was called. The patient had previously signed written consent for CS including possible classical as well as blood transfusion. Verbal consent was again obtained to proceed. Please see operative report for further details.    Doris Sams MD  ObGyn, PGY-3  02/14/2023 2:21 PM

## 2023-02-14 NOTE — ANESTHESIA PREPROCEDURE EVALUATION
Anesthesia Pre-Procedure Evaluation    Patient: Dario Nathan   MRN: [id number] : 1995        Procedure : * No procedures listed *          Past Medical History:   Diagnosis Date     Diabetes mellitus, type 2 (H) 11/10/2022     Genital herpes      Varicella       Past Surgical History:   Procedure Laterality Date     NO HISTORY OF SURGERY        No Known Allergies   Social History     Tobacco Use     Smoking status: Never     Smokeless tobacco: Never   Substance Use Topics     Alcohol use: No      Wt Readings from Last 1 Encounters:   23 70.6 kg (155 lb 10.3 oz)        Anesthesia Evaluation   Pt has not had prior anesthetic         ROS/MED HX  ENT/Pulmonary:       Neurologic:       Cardiovascular:       METS/Exercise Tolerance:     Hematologic:     (+) anemia,     Musculoskeletal:       GI/Hepatic:       Renal/Genitourinary:       Endo:     (+) type II DM, Last HgA1c: 4.9, date: 2023,     Psychiatric/Substance Use:       Infectious Disease:       Malignancy:       Other:            Physical Exam    Airway        Mallampati: II   TM distance: > 3 FB   Neck ROM: full   Mouth opening: > 3 cm    Respiratory Devices and Support         Dental           Cardiovascular             Pulmonary               Other findings: PPROM oligohydraminios sudden severe deceleration    OUTSIDE LABS:  CBC:   CBC RESULTS: Recent Labs   Lab Test 23  1256 23  1749 22  1101 10/11/22  1624   WBC 9.0 9.8 7.9 7.2   HGB 11.0* 11.6* 11.4* 12.3   HCT 34.0* 35.4 33.8* 37.2    299 277 260     Last Comprehensive Metabolic Panel:  Recent Labs   Lab Test 23  1013 23  0132 23  2056 23  1828 23  1331 22  1101   CR  --   --   --   --   --  0.50*   GFRESTIMATED  --   --   --   --   --  >90   * 146* 139* 177*   < >  --     < > = values in this interval not displayed.        Lab Results   Component Value Date    WBC 9.0 2023    WBC 9.8 2023    HGB 11.0 (L)  02/14/2023    HGB 11.6 (L) 01/26/2023    HCT 34.0 (L) 02/14/2023    HCT 35.4 01/26/2023     02/14/2023     01/26/2023     BMP:   Lab Results   Component Value Date    CR 0.50 (L) 12/05/2022     (H) 02/14/2023     (H) 02/14/2023     COAGS: No results found for: PTT, INR, FIBR  POC:   Lab Results   Component Value Date    BGM 72 05/17/2012    HCG Negative 10/04/2019     HEPATIC:   Lab Results   Component Value Date    ALT <5 (L) 12/05/2022    AST 14 12/05/2022     OTHER:   Lab Results   Component Value Date    A1C 4.9 01/28/2023    TSH 1.65 05/18/2022       Anesthesia Plan    ASA Status:  3, emergent       Anesthesia Type: General.         Techniques and Equipment:     - Airway: Video-Laryngoscope     - Lines/Monitors: 2nd IV     Consents    Anesthesia Plan(s) and associated risks, benefits, and realistic alternatives discussed. Questions answered and patient/representative(s) expressed understanding.    - Discussed:     - Discussed with:  Patient      - Patient is DNR/DNI Status: No    Use of blood products discussed: Yes.     - Discussed with: Patient.     - Consented: consented to blood products            Reason for refusal: other.     Postoperative Care    Pain management: Oral pain medications, Peripheral nerve block (Single Shot), Multi-modal analgesia.   PONV prophylaxis: Ondansetron (or other 5HT-3), Background Propofol Infusion     Comments:                Miriam Odonnell MD

## 2023-02-14 NOTE — PROGRESS NOTES
Diabetes Consult Daily  Progress Note          Assessment/Plan:     HPI:  Dario Nathan is a 27 year old with a past medical hx of T2DM who is a  at 24w5d by 6w0d US on day of admission 2023-- admitted for PPROM    It appears patient undergoing emergent  today.  Had bloody discharge this AM.  RN reporting placenta abruption.     Assessment:   1)  Type 2 Diabetes Mellitus; suboptimal control.  A1c 6.7% now S/P emergent       Plan:     -  discontinue Lantus, novolog CHO coverage    -  Will start novolog medium resistance sliding scale insulin AC >140, HS >200 post delivery    -   Patient did receive lantus this AM and insulin needs will decrease post delivery, so she is at risk for hypoglycemia, consider D10 infusion for hypoglycemia (prn ordered)    -  Hypoglycemia protocol    -  Recommend carb counting protocol    -  Education :  TBD     -  Outpatient follow up:  recommend [facility]ocr[facility]ology vs PCP, educators ROWENA Narayan RD, HENRRY, ESTEBAN Beasley RD, HENRRY, ESTEBAN     Discussed with patient, RN unavailable, primary team through this note.         Interval History:     BG trend reviewed. BG brian last night, she had outside food and likely CHO estimate was not accurate.  Having bloody discharge today, it appears urgent  occurring.  Unable to get ahold of the RN.  Will hold lantus, novolog prandial insulin and start medium resistance novolog post delivery. She did receive her lantus this AM, so she is at risk for hypoglycemia post delivery.  Consider D10.       Planned Procedures/surgeries: none  D5W-containing solutions/medications: none    Recent Labs   Lab 23  0132 23  2056 23  1828 23  1614 23  1152 23  0622   * 139* 177* 67* 94 86         Nutrition:     Orders Placed This Encounter      Regular Diet Adult        PTA Regimen:     Medications:   Levemir 16 unit(s) once daily   BG monitoring frequency:  2-4 times  per day   BG trends at home: 70's - 150's            Review of Systems:   See interval hx         Medications:   Steroid planning:  BMZ course completed   Tube Feeding: no       Physical Exam:   /54 (BP Location: Left arm, Patient Position: Supine, Cuff Size: Adult Regular)   Pulse 77   Temp 97.9  F (36.6  C) (Oral)   Resp 16   Wt 70.1 kg (154 lb 8 oz)   LMP 07/16/2022   SpO2 99%   BMI 28.26 kg/m       General: pleasant, in no distress. Walking around the room  HEENT: normocephalic, atraumatic. Oral mucous membranes moist.   Lungs: unlabored respiration, no cough  ABD: rounded, nondistended  Skin: warm and dry, no obvious lesions  MSK:  moves all extremities  Mental status:  alert, oriented to self, place, time  Psych:   calm and appropriate interaction            Data:     Lab Results   Component Value Date    A1C 6.7 10/11/2022    A1C 6.4 05/18/2022    A1C 5.8 03/19/2012        BMP  Recent Labs   Lab 02/14/23  0132 02/13/23  2056 02/13/23  1828 02/13/23  1614   * 139* 177* 67*     Recent Labs   Lab Test 02/09/23  1038 02/09/23  0325 01/18/23  1331 12/05/22  1101   GLC 73 93   < >  --    CR  --   --   --  0.50*    < > = values in this interval not displayed.     CBC RESULTS: Recent Labs   Lab Test 01/26/23  1749   WBC 9.8   RBC 4.21   HGB 11.6*   HCT 35.4   MCV 84   MCH 27.6   MCHC 32.8   RDW 13.0              50 minutes spent on the date of the encounter doing chart review, history and exam, coordinating care/communication, documentation and further activities per the note.          To contact Endocrine Diabetes service:   From 8AM-4PM: page inpatient diabetes provider who is following the patient that day (see filed or incomplete progress notes/consult notes).  If uncertain of provider assignment: page job code [phone]. (To page job code in-house dial 3 stars, 777 then enter number).  For questions or updates AFTER HOURS from 4PM-8AM: page the diabetes job code for on call fellow:  [phone]    Please notify inpatient diabetes service if changes are planned to steroids, nutrition, or if procedures are planned requiring prolonged NPO status.Diabetes Management Team job code: [phone]    MAGALI Gallegos, CNP  Inpatient Diabetes Management Service  Pager [phone]

## 2023-02-14 NOTE — ANESTHESIA PROCEDURE NOTES
Airway       Patient location during procedure: OR       Procedure Start/Stop Times: 2/14/2023 1:34 PM  Staff -        Anesthesiologist:  Miriam Odonnell MD       Resident/Fellow: Angus Crespo MD       Performed By: resident  Consent for Airway        Urgency: elective  Indications and Patient Condition       Indications for airway management: lori-procedural       Induction type:RSI       Mask difficulty assessment: 0 - not attempted    Final Airway Details       Final airway type: endotracheal airway       Successful airway: ETT - single and Oral  Endotracheal Airway Details        ETT size (mm): 7.0       Cuffed: yes       Successful intubation technique: video laryngoscopy       VL Blade Size: MAC 3       Grade View of Cords: 1       Bite block used: Soft    Post intubation assessment        Placement verified by: capnometry, equal breath sounds and chest rise        Number of attempts at approach: 1       Number of other approaches attempted: 0       Secured with: pink tape       Ease of procedure: easy       Dentition: Intact and Unchanged    Medication(s) Administered   Medication Administration Time: 2/14/2023 1:34 PM

## 2023-02-14 NOTE — ANESTHESIA PROCEDURE NOTES
TAP Procedure Note    Pre-Procedure   Staff -        Anesthesiologist:  Miriam Odonnell MD       Resident/Fellow: Angus Crespo MD       Performed By: resident       Location: OB       Pre-Anesthestic Checklist: patient identified, IV checked, site marked, risks and benefits discussed, informed consent, monitors and equipment checked, pre-op evaluation, at physician/surgeon's request and post-op pain management  Timeout:       Correct Patient: Yes        Correct Procedure: Yes        Correct Site: Yes        Correct Position: Yes        Correct Laterality: Yes        Site Marked: Yes  Procedure Documentation  Procedure: TAP       Laterality: bilateral       Patient Position: supine       Patient Prep/Sterile Barriers: sterile gloves, mask       Skin prep: Chloraprep       Needle Type: short bevel       Needle Gauge: 21.        Needle Length (millimeters): 110        Ultrasound guided       1. Ultrasound was used to identify targeted nerve, plexus, vascular marker, or fascial plane and place a needle adjacent to it in real-time.       2. Ultrasound was used to visualize the spread of anesthetic in close proximity to the above referenced structure.       3. A permanent image is entered into the patient's record.       4. The visualized anatomic structures appeared normal.       5. There were no apparent abnormal pathologic findings.    Assessment/Narrative         The placement was negative for: blood aspirated, painful injection and site bleeding       Paresthesias: No.       Bolus given via. no blood aspirated via catheter.        Secured via.        Insertion/Infusion Method: Single Shot       Complications: none    Medication(s) Administered   Bupivacaine 0.25% PF (Infiltration) - Infiltration   20 mL - 2/14/2023 2:57:00 PM  Bupivacaine liposome (Exparel) 1.3% LA inj susp (Infiltration) - Infiltration   20 mL - 2/14/2023 2:57:00 PM    FOR [facility] ([facility]) ONLY:   Pain Team Contact information: please  "page the Pain Team Via [facility]. Search \"Pain\". During daytime hours, please page the attending first. At night please page the resident first.    "

## 2023-02-14 NOTE — PROGRESS NOTES
Patient up to bathroom at 12:00pm and had spotting on pad again. Still reports mild occasional cramping not happening very frequently. A few every hour. Paged Dr. Maki with update on patient and MD to bedside to scan patient and found to be vertex now. Had been breech prior. Per MD would like patient on maintenance fluids and to keep NPO and continue monitoring for another 2 hours. Continued to watch patient fetal monitoring and baby off monitor at 13:07. At bedside and took awhile to find heart tones and when fetal heart tones back on saw repetitive deep decels. Additional staff to bedside after attempting to turn patient to side and hands and knees with no positive response in fetal heart tones. Doris Sams performed SVE, patient 1cm dilated with blood and clots coming out with SVE. Patient brought to OR for emergency  section at 13:29 in OR at 13:30 Patient put to sleep under general anesthesia, fetal tones in 70s in OR at 13:31, iodine prep done and incision at 13:34 and baby born at 13:35. Recovery uneventful patient bleeding was minimal and normal recovery post anesthesia with PACU RN also at bedside to help with recovery. Went to see baby at 16:30 when NICU was ready for visitors, baby improving from critical state at delivery. Dario brought to pospartum room 7121 after visiting baby and report given to Lanny SMITH at 17:00 with mutual fundal exam. Patient resting comfortably in postpartum room and cares relinquished at this time.

## 2023-02-14 NOTE — ANESTHESIA CARE TRANSFER NOTE
Patient: Dario Nathan    Procedure: * No procedures listed *       Diagnosis: * No pre-op diagnosis entered *  Diagnosis Additional Information: No value filed.    Anesthesia Type:   No value filed.     Note:    Oropharynx: oropharynx clear of all foreign objects and spontaneously breathing  Level of Consciousness: awake  Oxygen Supplementation: face mask  Level of Supplemental Oxygen (L/min / FiO2): 6  Independent Airway: airway patency satisfactory and stable  Dentition: dentition unchanged  Vital Signs Stable: post-procedure vital signs reviewed and stable  Report to RN Given: handoff report given  Patient transferred to: PACU    Handoff Report: Identifed the Patient, Identified the Reponsible Provider, Reviewed the pertinent medical history, Discussed the surgical course, Reviewed Intra-OP anesthesia mangement and issues during anesthesia, Set expectations for post-procedure period and Allowed opportunity for questions and acknowledgement of understanding      Vitals:  Vitals Value Taken Time   /67 02/14/23 1445   Temp     Pulse     Resp 16 02/14/23 1445   SpO2 100 % 02/14/23 1445       Electronically Signed By: Angus Crespo MD  February 14, 2023  2:58 PM

## 2023-02-15 LAB
GLUCOSE BLDC GLUCOMTR-MCNC: 127 MG/DL (ref 70–99)
GLUCOSE BLDC GLUCOMTR-MCNC: 147 MG/DL (ref 70–99)
GLUCOSE BLDC GLUCOMTR-MCNC: 63 MG/DL (ref 70–99)
GLUCOSE BLDC GLUCOMTR-MCNC: 76 MG/DL (ref 70–99)
GLUCOSE BLDC GLUCOMTR-MCNC: 79 MG/DL (ref 70–99)
GLUCOSE BLDC GLUCOMTR-MCNC: 84 MG/DL (ref 70–99)
GLUCOSE BLDC GLUCOMTR-MCNC: 93 MG/DL (ref 70–99)
HGB BLD-MCNC: 10 G/DL (ref 11.7–15.7)

## 2023-02-15 PROCEDURE — 250N000011 HC RX IP 250 OP 636

## 2023-02-15 PROCEDURE — 120N000002 HC R&B MED SURG/OB UMMC

## 2023-02-15 PROCEDURE — 36415 COLL VENOUS BLD VENIPUNCTURE: CPT | Performed by: STUDENT IN AN ORGANIZED HEALTH CARE EDUCATION/TRAINING PROGRAM

## 2023-02-15 PROCEDURE — 85018 HEMOGLOBIN: CPT | Performed by: STUDENT IN AN ORGANIZED HEALTH CARE EDUCATION/TRAINING PROGRAM

## 2023-02-15 PROCEDURE — 99232 SBSQ HOSP IP/OBS MODERATE 35: CPT | Performed by: NURSE PRACTITIONER

## 2023-02-15 PROCEDURE — 250N000011 HC RX IP 250 OP 636: Performed by: STUDENT IN AN ORGANIZED HEALTH CARE EDUCATION/TRAINING PROGRAM

## 2023-02-15 PROCEDURE — 250N000013 HC RX MED GY IP 250 OP 250 PS 637: Performed by: STUDENT IN AN ORGANIZED HEALTH CARE EDUCATION/TRAINING PROGRAM

## 2023-02-15 RX ORDER — ENOXAPARIN SODIUM 100 MG/ML
40 INJECTION SUBCUTANEOUS EVERY 24 HOURS
Status: DISCONTINUED | OUTPATIENT
Start: 2023-02-15 | End: 2023-02-17 | Stop reason: HOSPADM

## 2023-02-15 RX ORDER — KETOROLAC TROMETHAMINE 30 MG/ML
INJECTION, SOLUTION INTRAMUSCULAR; INTRAVENOUS
Status: COMPLETED
Start: 2023-02-15 | End: 2023-02-15

## 2023-02-15 RX ADMIN — KETOROLAC TROMETHAMINE 30 MG: 30 INJECTION, SOLUTION INTRAMUSCULAR; INTRAVENOUS at 09:05

## 2023-02-15 RX ADMIN — ENOXAPARIN SODIUM 40 MG: 40 INJECTION SUBCUTANEOUS at 09:09

## 2023-02-15 RX ADMIN — ACETAMINOPHEN 975 MG: 325 TABLET ORAL at 12:32

## 2023-02-15 RX ADMIN — ACETAMINOPHEN 975 MG: 325 TABLET ORAL at 18:06

## 2023-02-15 RX ADMIN — SENNOSIDES AND DOCUSATE SODIUM 2 TABLET: 50; 8.6 TABLET ORAL at 20:57

## 2023-02-15 RX ADMIN — ACETAMINOPHEN 975 MG: 325 TABLET ORAL at 05:49

## 2023-02-15 RX ADMIN — KETOROLAC TROMETHAMINE 30 MG: 30 INJECTION, SOLUTION INTRAMUSCULAR at 09:05

## 2023-02-15 RX ADMIN — IBUPROFEN 800 MG: 800 TABLET, FILM COATED ORAL at 20:57

## 2023-02-15 RX ADMIN — ACETAMINOPHEN 975 MG: 325 TABLET ORAL at 23:55

## 2023-02-15 RX ADMIN — SENNOSIDES AND DOCUSATE SODIUM 1 TABLET: 50; 8.6 TABLET ORAL at 09:03

## 2023-02-15 RX ADMIN — ACETAMINOPHEN 975 MG: 325 TABLET ORAL at 00:07

## 2023-02-15 RX ADMIN — IBUPROFEN 800 MG: 800 TABLET, FILM COATED ORAL at 15:05

## 2023-02-15 RX ADMIN — KETOROLAC TROMETHAMINE 30 MG: 30 INJECTION, SOLUTION INTRAMUSCULAR; INTRAVENOUS at 01:58

## 2023-02-15 ASSESSMENT — ACTIVITIES OF DAILY LIVING (ADL)
ADLS_ACUITY_SCORE: 20

## 2023-02-15 NOTE — PROGRESS NOTES
Post  Anesthesia Follow Up Note    Patient: Dario Nathan    Patient location: Postpartum floor.    Chief complaint: Acute postoperative pain management s/p general anesthesia.    Procedure(s) Performed:  Procedure(s):   SECTION    Anesthesia type: General    Subjective:     Pain Control: Adequate    Additional ROS:  She does not complain of pruritis at this time. She denies weakness, denies paresthesia, denies difficulties breathing or voiding, denies nausea or vomiting. She is able to ambulate and tolerates regular diet.    Objective:    Respiratory Function (RR / SpO2 / Airway Patency): Satisfactory    Cardiac Function (HR / Rhythm / BP): Satisfactory    Strength and sensation lower extremities: Normal    Last Vitals: /64   Pulse 78   Temp 36.7  C (98  F) (Oral)   Resp 18   Wt 70.6 kg (155 lb 10.3 oz)   LMP 2022   SpO2 100%   Breastfeeding Unknown   BMI 28.47 kg/m      Assessment and plan:   Dario Nathan is a 27 year old female  POD #1 s/p  with general anesthesia and single shot TAP nerve block injections. Pt is ambulating without difficulty, no weakness or paresthesias.  There is no evidence of adverse side effects associated with general anethesia and nerve block injections.  The patient is receiving adequate incisional pain control at this time and anticipate up to 72 hours of incisional pain control.  However, we anticipate that the patient will require opioid/nonopioid analgesics for visceral and muscle pain that is not controlled with local anesthetic.      In brief summary, her post-operative analgesia is adequate today. Further interventional analgesic strategies would be of little utility at this time. Thus, we recommend proceeding with PO analgesics.    Thank you for including us in the care of this patient.    Angus Crespo, DO  Anesthesiology PGY3

## 2023-02-15 NOTE — PROGRESS NOTES
OB Postpartum Progress Note    S: Feeling ok this morning. She is processing and a bit overwhelmed by the events of delivery. Pain is overall well controlled. Lochia improving. Tolerating water without nausea or emesis, hasn't eaten much as she hasn't had much appetite. Passing flatus. Ambulating without dizziness or lightheadedness. Voiding spontaneously without issues. Pumping for baby in NICU.     O:  Patient Vitals for the past 24 hrs:   BP Temp Temp src Pulse Resp SpO2 Oximeter Heart Rate   02/15/23 0551 103/68 97.9  F (36.6  C) Oral 67 16 -- --   02/15/23 0220 107/64 97.6  F (36.4  C) Oral 77 16 100 % --   02/14/23 2156 102/62 98.3  F (36.8  C) Oral 76 16 100 % --   02/14/23 2120 106/72 98.1  F (36.7  C) Oral 72 16 100 % --   02/14/23 2029 109/71 97.6  F (36.4  C) Oral 79 16 -- --   02/14/23 1910 109/68 98.7  F (37.1  C) Oral 76 18 100 % --   02/14/23 1820 105/59 98.5  F (36.9  C) Oral 70 16 100 % --   02/14/23 1745 115/71 98.3  F (36.8  C) Oral 79 18 100 % --   02/14/23 1715 123/70 98  F (36.7  C) Oral 50 18 100 % --   02/14/23 1545 108/67 98.2  F (36.8  C) Oral -- 16 100 % 73 bpm   02/14/23 1530 104/71 -- -- -- 16 100 % 71 bpm   02/14/23 1515 106/71 97.2  F (36.2  C) Oral -- 12 100 % 68 bpm   02/14/23 1500 106/71 -- -- -- 18 100 % 74 bpm   02/14/23 1445 104/67 -- -- -- 16 100 % 68 bpm   02/14/23 1430 98/58 97.4  F (36.3  C) Oral -- 16 100 % 83 bpm       Gen: NAD. Alert, oriented. Resting comfortably in bed.  CV: Regular rate  Resp: On room air, no increased work of breathing  Abd: Soft, appropriately tender, fundus firm below the umbilicus, appropriately tender  Incision: C/D/I, bandage in place  Ext: trace lower extremity edema bilaterally      Labs:   Hemoglobin   Date Value Ref Range Status   02/15/2023 10.0 (L) 11.7 - 15.7 g/dL Final   02/14/2023 11.0 (L) 11.7 - 15.7 g/dL Final   10/17/2018 12.8 11.7 - 15.7 g/dL Final   01/01/2016 10.1 (L) 11.7 - 15.7 g/dL Final       A/P: Dario Nathan is a 27 year old   who is POD#1 s/p STAT PLTCS for placental abruption after 20 day antepartum stay for PPROM. Pregnancy notable for type 2 diabetes. Doing well postpartum.    Type 2 Diabetes  - Endocrinology following, appreciate recs  - Currently on mSSI per endo  - Hold PTA lantus and carb coverage per endo  - BG QID    Postpartum/Postop  - Pain: Continue scheduled tylenol, ibuprofen and prn oxycodone  - Heme: Hgb 11.0>>AM hgb pending, asymptomatic from ABLA. Will discharge with po iron if hgb <10.  - GI: Scheduled senna BID, simethicone TID. Prn miralax, suppository, anti-emetics  - : s/p Wetzel. Voiding spontaneously   - PNC: Rh pos, Rubella immune  - Pumping for baby in NICU  - Contraception: need to discuss.   - PPx: Encourage ambulation, IS, SCDs while confined to bed      Anticipate discharge POD#2-3; once meeting postpartum discharge goals     Erin William MD  OB/GYN Resident, PGY-2    Physician Attestation   I personally examined and evaluated this patient.  I discussed the patient with the resident/fellow and care team, and agree with the assessment and plan of care as documented in the note.     Key findings: Patient states her pain is controlled when she gets pain medications.  She is ambulating with minimal dizziness, voiding without issues, tolerating regular diet, and bleeding is decreasing.  Has been visiting baby in NICU.  Recommend continued ambulation and patient can shower at 24 hours after incision closed. Discussed taking bandage off in the shower.  Likely discharge to home POD#2-3.      Lashaun Pittman MD  Date of Service (when I saw the patient): 02/15/23

## 2023-02-15 NOTE — DISCHARGE INSTRUCTIONS
IP Diabetes Management Team Discharge Instructions    Glucose Control Regimen:     no medications at this time on discharge    Blood Glucose Checks:   2-3 times daily before meals, and at bedtime.    Can do alternating BG checks - 1-2 times per day (or more)  Example:  0800 and 1600 one day   1200 and bedtime another day  Please keep a journal :)      Endocrinology Outpatient follow up: Please call the clinic at [phone]  if you have non-urgent questions regarding your blood sugars or insulin.     Follow up with your PCP in 3-6 weeks.    If you have urgent questions or concerns regarding your blood sugars or insulin, you may contact [phone] (the main hospital ). Ask to speak with the endocrinologist on call.    Your target A1c value is less than 7%. Your most recent A1c is 6.7%.     Thank you for letting the Diabetes Management Team be involved in your care!    Postop  Birth Instructions    Activity     Do not lift more than 10 pounds for 6 weeks after surgery.  Ask family and friends for help when you need it.  No driving until you have stopped taking your pain medications (usually two weeks after surgery).  No heavy exercise or activity for 6 weeks.  Don't do anything that will put a strain on your surgery site.  Don't strain when using the toilet.  Your care team may prescribe a stool softener if you have problems with your bowel movements.     To care for your incision:     Keep the incision clean and dry.  Do not soak your incision in water. No swimming or hot tubs until it has fully healed. You may soak in the bathtub if the water level is below your incision.  Do not use peroxide, gel, cream, lotion, or ointment on your incision.  Adjust your clothes to avoid pressure on your surgery site (check the elastic in your underwear for example).     You may see a small amount of clear or pink drainage and this is normal.  Check with your health care provider:     If the drainage increases or  has an odor.  If the incision reddens, you have swelling, or develop a rash.  If you have increased pain and the medicine we prescribed doesn't help.  If you have a fever above 100.4 F (38 C) with or without chills when placing thermometer under your tongue.   The area around your incision (surgery wound), will feel numb.  This is normal. The numbness should go away in less than a year.     Keep your hands clean:  Always wash your hands before touching your incision (surgery wound). This helps reduce your risk of infection. If your hands aren't dirty, you may use an alcohol hand-rub to clean your hands. Keep your nails clean and short.    Call your healthcare provider if you have any of these symptoms:     You soak a sanitary pad with blood within 1 hour, or you see blood clots larger than a golf ball.  Bleeding that lasts more than 6 weeks.  Vaginal discharge that smells bad.  Severe pain, cramping or tenderness in your lower belly area.  A need to urinate more frequently (use the toilet more often), more urgently (use the toilet very quickly), or it burns when you urinate.  Nausea and vomiting.  Redness, swelling or pain around a vein in your leg.  Problems breastfeeding or a red or painful area on your breast.  Chest pain and cough or are gasping for air.  Problems with coping with sadness, anxiety or depression. If you have concerns about hurting yourself or the baby, call your provider immediately.    You have questions or concerns after you return home.

## 2023-02-15 NOTE — PROGRESS NOTES
SPIRITUAL HEALTH SERVICES Progress Note  [facility] ([facility]) [facility]    Saw pt Dario Nathan per follow up post delivery at 27 weeks.    Illness Narrative - Dario was admitted almost a month ago and delivered her baby yesterday who is now in the NICU      Distress -  Dario spoke with me about her son and how he's doing. She is focusing on healing and adjusting to her current status.      Coping - Dario's  and two sons were present at the time of our visit. She appreciates continued support from [facility].       Plan - I will follow up per LOS and as needed. [facility] remains available for emergent requests/referrals.     Yessi Ron  Chaplain Resident  Pager [phone]    * [facility] remains available 24/7 for emergent requests/referrals, either by having the switchboard page the on-call  or by entering an ASAP/STAT consult in Epic (this will also page the on-call ). Routine Epic consults receive an initial response within 24 hours.*

## 2023-02-15 NOTE — PROGRESS NOTES
Received order for SW to see regarding baby's premature birth and NICU admission.    SW already involved.  Psychosocial assessment was completed on 23 when Dario was receiving care in the PSCU.  Please see SW notes for assessment details.    ZE met with Dario and her , Hipolito puga to offer congratulations and support regarding their emergent  delivery and Codey's critical status.     SW will be following closely throughout Codey's NICU admission for supportive interventions.    CONNIE Virgen [facility]  Clinical   Maternal Child Health  Phone:  [phone]  Pager:  [phone]

## 2023-02-15 NOTE — PROGRESS NOTES
Diabetes Consult Daily  Progress Note          Assessment/Plan:     HPI:  Dario Nathan is a 27 year old with a past medical hx of T2DM who is a  at 24w5d by 6w0d US on day of admission 2023-- admitted for PPROM now S/P emergent  23.      Endocrine will sign off at this time, page job code [id number] with concerns, questions, or change in plan. See discharge recs below.       Assessment:   1)  Type 2 Diabetes Mellitus; suboptimal control.  A1c 6.7% now S/P emergent       Plan:     -  Continue novolog medium resistance sliding scale insulin AC >140, HS >200 while in the hospital    -  Hypoglycemia protocol    -  Recommend carb counting protocol    -  Education :  none needed    -  Outpatient follow up: Follow up with PCP in 3-6 weeks, bring BG log to appt.     IP Diabetes Management Team Discharge Instructions    Glucose Control Regimen:     no medications at this time on discharge  -discontinue Lantus and novolog    Blood Glucose Checks:   2-3 times daily before meals, and at bedtime.    Can do alternating BG checks - 1-2 times per day (or more)  Example:  0800 and 1600 one day   1200 and bedtime another day  Please keep a journal :)      Endocrinology Outpatient follow up: Please call the clinic at [phone]  if you have non-urgent questions regarding your blood sugars or insulin.     Follow up with your PCP in 3-6 weeks.    If you have urgent questions or concerns regarding your blood sugars or insulin, you may contact [phone] (the main hospital ). Ask to speak with the endocrinologist on call.    Your target A1c value is less than 7%. Your most recent A1c is 6.7%.     Thank you for letting the Diabetes Management Team be involved in your care!         Discussed with patient and  primary team paged.          Interval History:     BG stable with no insulin needs now post delivery.  She is eating well.  CGM is on.  Baby is in NICU- patient had a  boy.  She did not require medications for her DM 2 in between pregnancies and I anticipate no needs currently.  Will need to follow up with PCP.       Planned Procedures/surgeries: none  D5W-containing solutions/medications: none    Recent Labs   Lab 02/15/23  0323 02/15/23  0226 02/15/23  0204 02/14/23  2203 02/14/23  1908 02/14/23  1851   * 76 63* 162* 117* 77         Nutrition:     Orders Placed This Encounter      Advance Diet as Tolerated: Regular Diet Adult        PTA Regimen:     Medications:   Levemir 16 unit(s) once daily   BG monitoring frequency:  2-4 times per day   BG trends at home: 70's - 150's            Review of Systems:   See interval hx         Medications:   Steroid planning:  BMZ course completed   Tube Feeding: no       Physical Exam:   /68 (BP Location: Right arm, Patient Position: Semi-Cardenas's, Cuff Size: Adult Regular)   Pulse 67   Temp 97.9  F (36.6  C) (Oral)   Resp 16   Wt 70.6 kg (155 lb 10.3 oz)   LMP 07/16/2022   SpO2 100%   Breastfeeding Unknown   BMI 28.47 kg/m       General: pleasant, in no distress. Walking around the room  HEENT: normocephalic, atraumatic. Oral mucous membranes moist.   Lungs: unlabored respiration, no cough  ABD: rounded, nondistended  Skin: warm and dry, no obvious lesions  MSK:  moves all extremities  Mental status:  alert, oriented to self, place, time  Psych:   calm and appropriate interaction            Data:     Lab Results   Component Value Date    A1C 6.7 10/11/2022    A1C 6.4 05/18/2022    A1C 5.8 03/19/2012        BMP  Recent Labs   Lab 02/15/23  0323 02/15/23  0226 02/15/23  0204 02/14/23  2203   * 76 63* 162*     Recent Labs   Lab Test 02/09/23  1038 02/09/23  0325 01/18/23  1331 12/05/22  1101   GLC 73 93   < >  --    CR  --   --   --  0.50*    < > = values in this interval not displayed.     CBC RESULTS: Recent Labs   Lab Test 01/26/23  1749   WBC 9.8   RBC 4.21   HGB 11.6*   HCT 35.4   MCV 84   MCH 27.6   MCHC 32.8    RDW 13.0              35 minutes spent on the date of the encounter doing chart review, history and exam, coordinating care/communication, documentation and further activities per the note.          To contact Endocrine Diabetes service:   From 8AM-4PM: page inpatient diabetes provider who is following the patient that day (see filed or incomplete progress notes/consult notes).  If uncertain of provider assignment: page job code [phone]. (To page job code in-house dial 3 stars, 777 then enter number).  For questions or updates AFTER HOURS from 4PM-8AM: page the diabetes job code for on call fellow: [phone]    Please notify inpatient diabetes service if changes are planned to steroids, nutrition, or if procedures are planned requiring prolonged NPO status.Diabetes Management Team job code: [phone]    MAGALI Gallegos, CNP  Inpatient Diabetes Management Service  Pager [phone]

## 2023-02-16 LAB
GLUCOSE BLDC GLUCOMTR-MCNC: 103 MG/DL (ref 70–99)
GLUCOSE BLDC GLUCOMTR-MCNC: 142 MG/DL (ref 70–99)
GLUCOSE BLDC GLUCOMTR-MCNC: 157 MG/DL (ref 70–99)
GLUCOSE BLDC GLUCOMTR-MCNC: 66 MG/DL (ref 70–99)
GLUCOSE BLDC GLUCOMTR-MCNC: 73 MG/DL (ref 70–99)
GLUCOSE BLDC GLUCOMTR-MCNC: 79 MG/DL (ref 70–99)

## 2023-02-16 PROCEDURE — 250N000013 HC RX MED GY IP 250 OP 250 PS 637: Performed by: STUDENT IN AN ORGANIZED HEALTH CARE EDUCATION/TRAINING PROGRAM

## 2023-02-16 PROCEDURE — 250N000011 HC RX IP 250 OP 636

## 2023-02-16 PROCEDURE — 120N000002 HC R&B MED SURG/OB UMMC

## 2023-02-16 RX ORDER — ACETAMINOPHEN 325 MG/1
650 TABLET ORAL EVERY 6 HOURS PRN
Qty: 100 TABLET | Refills: 0 | Status: SHIPPED | OUTPATIENT
Start: 2023-02-16

## 2023-02-16 RX ORDER — OXYCODONE HYDROCHLORIDE 5 MG/1
5 TABLET ORAL EVERY 4 HOURS PRN
Qty: 4 TABLET | Refills: 0 | Status: SHIPPED | OUTPATIENT
Start: 2023-02-16

## 2023-02-16 RX ORDER — AMOXICILLIN 250 MG
1 CAPSULE ORAL DAILY
Qty: 100 TABLET | Refills: 0 | Status: SHIPPED | OUTPATIENT
Start: 2023-02-16

## 2023-02-16 RX ORDER — IBUPROFEN 600 MG/1
600 TABLET, FILM COATED ORAL EVERY 6 HOURS PRN
Qty: 60 TABLET | Refills: 0 | Status: SHIPPED | OUTPATIENT
Start: 2023-02-16

## 2023-02-16 RX ADMIN — SENNOSIDES AND DOCUSATE SODIUM 2 TABLET: 50; 8.6 TABLET ORAL at 09:25

## 2023-02-16 RX ADMIN — IBUPROFEN 800 MG: 800 TABLET, FILM COATED ORAL at 20:45

## 2023-02-16 RX ADMIN — IBUPROFEN 800 MG: 800 TABLET, FILM COATED ORAL at 15:48

## 2023-02-16 RX ADMIN — OXYCODONE HYDROCHLORIDE 5 MG: 5 TABLET ORAL at 03:26

## 2023-02-16 RX ADMIN — IBUPROFEN 800 MG: 800 TABLET, FILM COATED ORAL at 09:25

## 2023-02-16 RX ADMIN — ACETAMINOPHEN 975 MG: 325 TABLET ORAL at 18:55

## 2023-02-16 RX ADMIN — ENOXAPARIN SODIUM 40 MG: 40 INJECTION SUBCUTANEOUS at 09:26

## 2023-02-16 RX ADMIN — IBUPROFEN 800 MG: 800 TABLET, FILM COATED ORAL at 03:20

## 2023-02-16 RX ADMIN — ACETAMINOPHEN 975 MG: 325 TABLET ORAL at 12:44

## 2023-02-16 RX ADMIN — SENNOSIDES AND DOCUSATE SODIUM 2 TABLET: 50; 8.6 TABLET ORAL at 20:46

## 2023-02-16 RX ADMIN — SIMETHICONE 80 MG: 80 TABLET, CHEWABLE ORAL at 09:26

## 2023-02-16 RX ADMIN — ACETAMINOPHEN 975 MG: 325 TABLET ORAL at 06:16

## 2023-02-16 ASSESSMENT — ACTIVITIES OF DAILY LIVING (ADL)
ADLS_ACUITY_SCORE: 20

## 2023-02-16 NOTE — LACTATION NOTE
"This note was copied from a baby's chart.  D:  I met with Dario; Codey is her 4th baby.  She nursed her others for 4-6 months each; her goal with Codey is \"breastfeeding/ latching, as long as I can, as long as he needs it\".  She is normally in good health (type 2 diabetes), takes no medications, and has no history of breast/chest surgery or trauma.  She has already started to pump.   I:  I gave her introductory materials and went over pumping guidelines.  I reviewed use of the pump, cleaning, labeling, storing, and logging.   I explained how to access the videos on hand expression and hands-on pumping.  I helped her make a hands-free pumping bra.  We discussed skin to skin holding and how to reach your lactation goals.  We talked about medications during breastfeeding.  She verbalized understanding via teachback.  I advised her to call her insurance company about pump coverage.    A:  Mom has information she needs to initiate her supply.   P:  Will continue to provide lactation support.    Dulce Coppola, RNC, IBCLC            "

## 2023-02-16 NOTE — PROGRESS NOTES
OB Postpartum Progress Note    S: Patient sleeping soundly. Did not disturb. Doing well with no concerns per RN.    O:  Patient Vitals for the past 24 hrs:   BP Temp Temp src Pulse Resp   23 0321 109/61 97.4  F (36.3  C) Oral 69 16   02/15/23 2059 101/60 98.5  F (36.9  C) Oral 81 16   02/15/23 1235 112/64 98  F (36.7  C) Oral 78 18       Labs:   Hemoglobin   Date Value Ref Range Status   02/15/2023 10.0 (L) 11.7 - 15.7 g/dL Final   2023 11.0 (L) 11.7 - 15.7 g/dL Final   10/17/2018 12.8 11.7 - 15.7 g/dL Final   2016 10.1 (L) 11.7 - 15.7 g/dL Final       A/P: Dario Nathan is a 27 year old  who is POD#2 s/p STAT PLTCS for placental abruption after 20 day antepartum stay for PPROM. Pregnancy notable for type 2 diabetes. Doing well postpartum.    Type 2 Diabetes  - Endocrinology following, appreciate recs  - Currently on mSSI per endo, no meds needed at discharge per endo  - BG QID  - Check BG TID at home with PCP follow up in 3-6 weeks    Postpartum/Postop  - Pain: Continue scheduled tylenol, ibuprofen and prn oxycodone  - Heme: Hgb 11.0>>10.0, asymptomatic.  - GI: Scheduled bowel regimen. Prn anti-emetics  - : s/p Wetzel. Voiding spontaneously   - PNC: Rh pos, Rubella immune  - Pumping for baby in NICU  - Contraception: need to discuss.   - PPx: Encourage ambulation, IS, SCDs while confined to bed    Discharge to home today vs tomorrow pending post-op goals    Erin William MD  OB/GYN Resident, PGY-2    Patient seen and examined by me, agree with above resident note. Overall doing well and meeting goals.  Still working with LC for pumping and managing pain, which was improved with oxy over night.  Will cont routine cares and plan discharge in am.    ALFONSO REDDY MD

## 2023-02-16 NOTE — ANESTHESIA POSTPROCEDURE EVALUATION
Patient: Dario Nathan    Procedure: * No procedures listed *  POCUS    Anesthesia Type:  General    Note:  Disposition: Inpatient   Postop Pain Control: Uneventful            Sign Out: Well controlled pain   PONV: No   Neuro/Psych: Uneventful            Sign Out: Acceptable/Baseline neuro status   Airway/Respiratory: Uneventful            Sign Out: Acceptable/Baseline resp. status   CV/Hemodynamics: Uneventful            Sign Out: Acceptable CV status; No obvious hypovolemia; No obvious fluid overload   Other NRE: NONE   DID A NON-ROUTINE EVENT OCCUR? No           Last vitals:  There were no vitals filed for this visit.    Electronically Signed By: Miriam Odonnell MD  February 15, 2023  9:48 PM

## 2023-02-16 NOTE — ANESTHESIA PREPROCEDURE EVALUATION
Anesthesia Pre-Procedure Evaluation    Patient: Dario Nathan   MRN: [id number] : 1995        Procedure : * No procedures listed *          Past Medical History:   Diagnosis Date     Diabetes mellitus, type 2 (H) 11/10/2022     Genital herpes      Varicella       Past Surgical History:   Procedure Laterality Date      SECTION N/A 2023    Procedure:  SECTION;  Surgeon: Amee Zaidi MD;  Location: [facility] L+D     NO HISTORY OF SURGERY        No Known Allergies   Social History     Tobacco Use     Smoking status: Never     Smokeless tobacco: Never   Substance Use Topics     Alcohol use: No      Wt Readings from Last 1 Encounters:   23 70.6 kg (155 lb 10.3 oz)        Anesthesia Evaluation            ROS/MED HX  ENT/Pulmonary:  - neg pulmonary ROS     Neurologic:  - neg neurologic ROS     Cardiovascular:  - neg cardiovascular ROS     METS/Exercise Tolerance:     Hematologic:       Musculoskeletal:       GI/Hepatic:       Renal/Genitourinary:       Endo:     (+) type II DM,     Psychiatric/Substance Use:       Infectious Disease:       Malignancy:       Other:            Physical Exam    Airway        Mallampati: II   TM distance: > 3 FB   Neck ROM: full   Mouth opening: > 3 cm    Respiratory Devices and Support         Dental  no notable dental history         Cardiovascular   cardiovascular exam normal          Pulmonary   pulmonary exam normal                OUTSIDE LABS:  CBC:   Lab Results   Component Value Date    WBC 9.0 2023    WBC 9.8 2023    HGB 10.0 (L) 02/15/2023    HGB 11.0 (L) 2023    HCT 34.0 (L) 2023    HCT 35.4 2023     2023     2023     BMP:   Lab Results   Component Value Date    CR 0.50 (L) 2022    GLC 93 02/15/2023    GLC 84 02/15/2023     COAGS: No results found for: PTT, INR, FIBR  POC:   Lab Results   Component Value Date    BGM 72 2012    HCG Negative 10/04/2019     HEPATIC:   Lab Results    Component Value Date    ALT <5 (L) 12/05/2022    AST 14 12/05/2022     OTHER:   Lab Results   Component Value Date    A1C 4.9 01/28/2023    TSH 1.65 05/18/2022       Anesthesia Plan    ASA Status:  3, emergent       Anesthesia Type: General.              Consents    Anesthesia Plan(s) and associated risks, benefits, and realistic alternatives discussed. Questions answered and patient/representative(s) expressed understanding.    - Discussed:     - Discussed with:  Patient, Implied consent/emergency      - Extended Intubation/Ventilatory Support Discussed: No.      - Patient is DNR/DNI Status: No    Use of blood products discussed: Yes.     - Discussed with: Patient.     - Consented: consented to blood products            Reason for refusal: other.     Postoperative Care    Pain management: IV analgesics, Oral pain medications, Multi-modal analgesia, Peripheral nerve block (Single Shot).        Comments:    Other Comments: PPROM with sudden decels       neg OB ROS.       Miriam Odonnell MD

## 2023-02-17 VITALS
SYSTOLIC BLOOD PRESSURE: 101 MMHG | BODY MASS INDEX: 28.47 KG/M2 | OXYGEN SATURATION: 100 % | TEMPERATURE: 98.1 F | WEIGHT: 155.65 LBS | HEART RATE: 69 BPM | RESPIRATION RATE: 16 BRPM | DIASTOLIC BLOOD PRESSURE: 66 MMHG

## 2023-02-17 LAB
GLUCOSE BLDC GLUCOMTR-MCNC: 79 MG/DL (ref 70–99)
PATH REPORT.COMMENTS IMP SPEC: NORMAL
PATH REPORT.COMMENTS IMP SPEC: NORMAL
PATH REPORT.FINAL DX SPEC: NORMAL
PATH REPORT.GROSS SPEC: NORMAL
PATH REPORT.MICROSCOPIC SPEC OTHER STN: NORMAL
PATH REPORT.RELEVANT HX SPEC: NORMAL
PHOTO IMAGE: NORMAL

## 2023-02-17 PROCEDURE — 250N000013 HC RX MED GY IP 250 OP 250 PS 637: Performed by: STUDENT IN AN ORGANIZED HEALTH CARE EDUCATION/TRAINING PROGRAM

## 2023-02-17 PROCEDURE — 250N000011 HC RX IP 250 OP 636

## 2023-02-17 RX ADMIN — IBUPROFEN 800 MG: 800 TABLET, FILM COATED ORAL at 09:49

## 2023-02-17 RX ADMIN — SENNOSIDES AND DOCUSATE SODIUM 2 TABLET: 50; 8.6 TABLET ORAL at 08:10

## 2023-02-17 RX ADMIN — ENOXAPARIN SODIUM 40 MG: 40 INJECTION SUBCUTANEOUS at 09:33

## 2023-02-17 RX ADMIN — ACETAMINOPHEN 975 MG: 325 TABLET ORAL at 06:50

## 2023-02-17 RX ADMIN — ACETAMINOPHEN 975 MG: 325 TABLET ORAL at 01:01

## 2023-02-17 RX ADMIN — IBUPROFEN 800 MG: 800 TABLET, FILM COATED ORAL at 04:00

## 2023-02-17 ASSESSMENT — ACTIVITIES OF DAILY LIVING (ADL)
ADLS_ACUITY_SCORE: 20

## 2023-02-17 NOTE — CONSULTS
Diabetes CNS consult received for Dario Nathan, age 27,  at 24w5d by 6w0d US who presented from [facility] for leakage of fluid. Admitted 2023.  Known type 2 DM complicating pregnancy. Acute hyperglycemia exacerbated by steroids- completed BMZ course -23 for PROM.      Inpatient Diabetes Service consulted at that time.  Patient treated with insulin Detemir and Novolog.  Has seen outpatient CDE.  Now S/P emergent  23.  Delivered viable male.    Discharging today.  Discharge instructions from Inpatient Diabetes Service are as below:    Glucose Control Regimen:      no medications at this time on discharge  -discontinue Lantus and novolog     Blood Glucose Checks:   2-3 times daily before meals, and at bedtime.     Can do alternating BG checks - 1-2 times per day (or more)  Example:  0800 and 1600 one day   1200 and bedtime another day  Please keep a journal :)        Endocrinology Outpatient follow up: Please call the clinic at [phone]  if you have non-urgent questions regarding your blood sugars or insulin.      Follow up with your PCP in 3-6 weeks.     If you have urgent questions or concerns regarding your blood sugars or insulin, you may contact [phone] (the main hospital ). Ask to speak with the endocrinologist on call.     Your target A1c value is less than 7%. Your most recent A1c is 6.7%.      No need for additonal instructions.    MAGALI Elaine  Diabetes Clinical Nurse Specialist/[facility]  [phone]

## 2023-02-17 NOTE — PLAN OF CARE
Problem: Postpartum ( Delivery)  Goal: Optimal Pain Control and Function  Outcome: Progressing   Goal Outcome Evaluation:       VSS. Postpartum assessment WDL. Patient started pumping this shift to give to baby in NICU. Patient went to the NICU to visit overnight. Patients garcia was removed this shift and patient is voiding well. Pain is being managed with IV Toradol and Tylenol. Patient has blood sugar checks before meals, at bedtime and at 2am. No sliding scale coverage needed. Incision is covered with bandage and is CDI. Continue with POC.                 
  Problem: Postpartum ( Delivery)  Goal: Optimal Pain Control and Function  Outcome: Progressing   Goal Outcome Evaluation:       VSS. Postpartum assessment WDL. Patient up independently in room. Patient went down to the NICU to visit baby a couple of times (Nursery 7, bed D). Also is using the Ipad to watch baby. Bonding well. Patient is pumping and using hand expression Pain is being managed with Ibuprofen, Tylenol and Oxycodone. Patient also wearing abdominal binder for support. Writer went over Red folder information including filling out BC and EDS. Continue with POC.                 
  Problem: Postpartum ( Delivery)  Goal: Successful Maternal Role Transition  Outcome: Progressing   Goal Outcome Evaluation:       Data: Vital signs within normal limits. Postpartum checks within normal limits - see flow record. Patient eating and drinking normally. Patient has Wetzel with adequate urine draining and has not yet been out of bed yet. No apparent signs of infection. Incision healing well, covered with dressing and no drainage visualized. Patient was educated on the importance of early pumping and has declined to pump but will let staff know when she is ready.  Action: Patient medicated during the shift for pain. See MAR. Patient reassessed within 1 hour after each medication and pain was improved - patient stated she was comfortable. Patient education done about pumping frequency, room orientation. See flow record.  Response: Positive attachment behaviors observed with infant. Support persons  Hipolito present.   Plan: Anticipate discharge POD 3.                 
 Premature Rupture of Membranes  Data: VSS, Afebrile. Pt reports leaking small amounts of clear fluid.Denies feeling contractions, vaginal bleeding, headache, dizziness.EFM as charted. Fetal assessment Appropriate for Gestational Age. Signs and symptoms of infection absent    Interventions: Monitor vital signs and indicators of infection every 4 hours while awake. Continue uterine/fetal assessment 3 times daily. Preventive measures include Medications and Frequent voiding. Encourage active range of motion and frequent position changes.  Plan: Continue expectant management. Observe for and notify care provider of indicators of progressing labor, signs/symptoms of infection, or fetal/maternal compromise. Report given to SARAH Waite.  
"  Problem: Plan of Care - These are the overarching goals to be used throughout the patient stay.    Goal: Plan of Care Review  Description: The Plan of Care Review/Shift note should be completed every shift.  The Outcome Evaluation is a brief statement about your assessment that the patient is improving, declining, or no change.  This information will be displayed automatically on your shift note.  Outcome: Progressing  Goal: Patient-Specific Goal (Individualized)  Description: You can add care plan individualizations to a care plan. Examples of Individualization might be:  \"Parent requests to be called daily at 9am for status\", \"I have a hard time hearing out of my right ear\", or \"Do not touch me to wake me up as it startles me\".  Outcome: Progressing  Goal: Optimal Comfort and Wellbeing  Outcome: Progressing  Goal: Readiness for Transition of Care  Outcome: Progressing     Problem: Prelabor Rupture of Membranes  Goal: Delayed Delivery with Absence of Infection  Outcome: Progressing     Problem: Maternal-Fetal Wellbeing  Goal: Optimal Maternal-Fetal Wellbeing  Outcome: Progressing     Problem: Diabetes in Pregnancy  Goal: Optimal Coping  Outcome: Progressing  Goal: Blood Glucose Level Within Targeted Range  Outcome: Progressing   Goal Outcome Evaluation:                        "
"Dario arrived to [facility] after a clinic appointment where low amniotic fluid was noted upon ultrasound. Patient had been in last week for ruling out rupture of membranes, see provider notes. Pt notes discharge, not necessarily on her pad but fluid \"present in her vagina that comes out when I sit on the toilet\". Fetus is very difficult to monitor despite maternal position changes. While audible heart tones can be heard in the 150s, hoping for fetal position for a better tracing.  Dr William notified of patient arrival, labs have been sent. Dr Berry will be in to see the patient.        "
Dario is doing well today. She was able to do laundry. Maternal vital signs stable. Afebrile. Signs and symptoms of infection not present. Denies any pain, contractions, pressures, cramping, leaking of fluids, or vaginal bleeding. Denies symptoms of  labor. Patient denies any visual changes, headaches, or epigastric pain. Reports normal fetal activity. FHM reassuring, see flowsheets for details on fetal heart rate tracings and uterine activity. Encouraged increasing how much she is eating per meal to promote healthy weight gain in pregnancy and patient is okay with this plan. Observe for and notify care provider of indicators of progressing labor, signs/symptoms of infection, fetal/maternal compromise. Continue with plan of care.      Report given to Rafi Garcia.  
Dario is doing well. Asymptomatic hypotension in the afternoon, encouraged fluid intake and blood pressure improved. Afebrile. Signs and symptoms of infection not present. Denies any pain, contractions, pressures, cramping, leaking of fluids, or vaginal bleeding. Denies symptoms of  labor. Patient denies any visual changes, headaches, or epigastric pain. Reports normal fetal activity. FHM see flowsheets for details on fetal heart rate tracings and uterine activity. Hydrocortisone cream PRN added for itching rash to right hand. Blood sugars well controlled. Observe for and notify care provider of indicators of progressing labor, signs/symptoms of infection, fetal/maternal compromise. Continue with plan of care.    Report given to Lanny SANCHEZ RN at 1915.  
Data: Afebrile. Leaking scant  amounts of clear fluid. Contraction pattern  - not shamir . Fetal assessment Appropriate for Gestational Age. Signs and symptoms of infection not present.  Interventions: Monitor vital signs and indicators of infection every 4 hours while awake. Continue uterine/fetal assessment TID. Activity level: Regular activity. Preventive measures include Positioning and Frequent voiding. Encourage active range of motion and frequent position changes.  Plan: Continue expectant management. Observe for and notify care provider of indicators of progressing labor, signs/symptoms of infection, or fetal/maternal compromise. Care rounds completed by provider. Ok for patient to take break from IV per Dr Berry. PIV removed.                         
Data: Afebrile. Leaking scant  amounts of clear fluid. Contraction pattern stable and within parameters. Fetal assessment Appropriate for Gestational Age. Signs and symptoms of infection absent.   Interventions: Monitor vital signs and indicators of infection every 4 hours while awake. Continue uterine/fetal assessment every shift. Activity level: Regular activity. Preventive measures include Medications, Positioning, and Frequent voiding. Encourage active range of motion and frequent position changes.  Plan: Continue expectant management. Observe for and notify care provider of indicators of progressing labor, signs/symptoms of infection, or fetal/maternal compromise.  Goal Outcome Evaluation:         Overall Patient Progress: no change           
Data: Dario Nathan transferred to [facility] via ambulation at 1710. Action: Receiving unit notified of transfer: Yes. Patient and family notified of room change. Report given to Julio C at 1715. Belongings sent to receiving unit. Accompanied by Registered Nurse. Oriented patient to surroundings. Call light within reach.  Response: Patient tolerated transfer and is stable.  
Data: VSS, Afebrile. Pt reports leaking small amounts of clear fluid.Denies feeling contractions, vaginal bleeding, headache, dizziness. EFM as charted. Fetal assessment Appropriate for Gestational Age. Signs and symptoms of infection absent    Interventions: Monitor vital signs and indicators of infection every 4 hours while awake. Continue uterine/fetal assessment 3 times daily. Preventive measures include Medications and Frequent voiding. Encourage active range of motion and frequent position changes.  Plan: Continue expectant management. Observe for and notify care provider of indicators of progressing labor, signs/symptoms of infection, or fetal/maternal compromise. Report given to SARAH George  
Data: Vital signs within normal limits. Postpartum checks within normal limits - see flow record. Patient eating and drinking normally. Patient able to empty bladder independently and is up ambulating. No apparent signs of infection. Incision healing well. Patient performing self cares and is able to care for infant.  Action: Patient medicated during the shift for pain. See MAR. Patient reassessed within 1 hour after each medication and pain was improved - patient stated she was comfortable. Patient education done about plan of care. See flow record.  Response: Positive attachment behaviors observed with infant. Support persons, , Hipoilto, children and mother-in-law, present.     
Goal Outcome Evaluation:        Data: Afebrile. Leaking scant  amounts of clear fluid. Contraction pattern occasional contractions noted on afternoon monitoring, pt denies feeling. Fetal assessment Appropriate for Gestational Age. Variable x 1 noted. Possible additional one but tracing was disconnected. Signs and symptoms of infection absent. Patient reported some constipation earlier this morning. Miralax every day ordered. Patient's family came to visit this evening.   Interventions: Monitor vital signs and indicators of infection every 4 hours while awake. Continue uterine/fetal assessment TID. Activity level: Regular activity. Preventive measures include Medications, Positioning and Frequent voiding. Encourage active range of motion and frequent position changes.  Plan: Continue expectant management. Observe for and notify care provider of indicators of progressing labor, signs/symptoms of infection, or fetal/maternal compromise.                 
Goal Outcome Evaluation:       Data: Vital signs within normal limits. Postpartum checks within normal limits - see flow record. Patient eating and drinking normally. Patient able to empty bladder independently and is up ambulating. No apparent signs of infection. Incision healing well, dressing CDI. Patient performing self cares and is able to care for infant.  Action: Patient medicated during the shift for pain and cramping. See MAR. Patient reassessed within 1 hour after each medication and pain was improved - patient stated she was comfortable. Patient education done about pumping frequency, pain management, incision care. See flow record.  Response: Positive attachment behaviors observed with infant. Support persons  Hipolito present.   Plan: Anticipate discharge POD 2-3.                 
Goal Outcome Evaluation:      Plan of Care Reviewed With: patient    Overall Patient Progress: no changeOverall Patient Progress: no change         Patient has remained stable this shift. VSS. Patient denies contractions, cramping, bleeding and pain. Patient reports small leaking occasionally felt through out the day. Extended monitoring in afternoon due to prolonged decel, reviewed with Dr Sylvester, see notes. Plan is to continue with antibiotics and watch closely. Patient remains afebrile and denies abdominal tenderness. Continue with plan of care.  
Goal Outcome Evaluation:      Plan of Care Reviewed With: patient    Overall Patient Progress: no changeOverall Patient Progress: no change       Patient has been stable this shift. VSS. Patient denies cramping, contractions, pain and bleeding. Patient continues to leak occasional clear fluid. FHT appropriate for gestational age, see flow record. Insulin drip discontinued at 1100, see eMAR, BG have been stable, will continue to monitor closely per order set, see endocrine note. Continue with plan of care.    
Goal Outcome Evaluation:      Plan of Care Reviewed With: patient    Overall Patient Progress: no changeOverall Patient Progress: no change       Patient has remained stable this shift. VSS. Continues on insulin drip for blood sugar management, currently on algorithm 3. Patient reports clear leaking of fluid, patient feels more leaking when she is laying down. Patient denies cramping, contractions, bleeding and pain. Continue with plan of care.   
Goal Outcome Evaluation:      Plan of Care Reviewed With: patient    Overall Patient Progress: no changeOverall Patient Progress: no change     Patient continues to leak fluid, denies bleeding or feeling contractions. Vital signs stable and afebrile. Patient has no complaints today. Patient is counting carbs and managing diet well. Continue with current plan of care.      
Goal Outcome Evaluation:      Plan of Care Reviewed With: patient    Overall Patient Progress: no changeOverall Patient Progress: no change     VSS. Afebril. Continue to leak a scant amount of clear fluid. Denies s/s of infection- abdominal pain, fever, chills, foul odor to amniotic fluid. Continues with latency antibiotics. No contractions. FHTs AGA. Attending antepartum group today. Plan to continue with expectant cares. Monitor for maternal/fetal compromise.       
Goal Outcome Evaluation:      Plan of Care Reviewed With: patient    Overall Patient Progress: no changeOverall Patient Progress: no change    Data: Afebrile. Leaking scant  amounts of clear fluid. Contraction pattern  No contractions . Fetal assessment Appropriate for Gestational Age. Signs and symptoms of infection none per patient. T2DM with BG checks AC, 2hr postprandial, HS and 0200. BG running lower today. Lantus decreased today by endocrine. AC lunch BG 65. Pt asymptomatic. Patient started eating right after BG checked and 120 ml apple juice also given. BG 20 minutes later, 96.  Interventions: Monitor vital signs and indicators of infection every 4 hours while awake. Continue uterine/fetal assessment three times daily. Activity level: Regular activity. Preventive measures include Medications, Positioning, and Frequent voiding. Encourage active range of motion and frequent position changes. Continue with scheduled BG checks.   Plan: Continue expectant management. Observe for and notify care provider of indicators of progressing labor, signs/symptoms of infection, or fetal/maternal compromise.     
Goal Outcome Evaluation:    Patient turned in EDS (score=1)  Patient turned in her birth certificate.  
Goal Outcome Evaluation:    VSS, assessment WDL. Leaking scant clear fluid. Denies other PTL symptoms. Denies bleeding. FHR appropriate for gestation. Will continue with plan of care.    
Goal Outcome Evaluation:  Pt states is comfortable, denies feeling ctx's or bleeding. Leaking clear scant fluid. Baby on monitor prolong decel X1 noted and extended monitoring done then AGA on monitor. No ctx's noted. Pt denies pain. Eating and drinking well. BG checks as ordered.  Saline locked flushed.Beta X1 given. No complains. Coping well with admission. NICU,  pending.   
Goal Outcome Evaluation:  VSS, afebrile. Patient denies pain, bleeding, and contractions. Patient leaking scant amounts of clear fluid. Patient slept well throughout the night. TID EFM monitoring (see flowsheet). Up ad marybeth. Patient stable at this time. Phone and call bell in reach. Patient states no questions or concerns at this time.    
Goal Outcome Evaluation:Patients VSS,/56 (BP Location: Right arm, Patient Position: Semi-Cardenas's, Cuff Size: Adult Regular)   Pulse 67   Temp 97.8  F (36.6  C) (Oral)   Resp 18   Fetal and maternal assessment done, no changes noted,Scanty amniotic fluid loss today.                          
Patient has done well overnight and slept between glucose checks.  Insulin needs have decreased throughout the night. VSS stable. Patient reports she continues to leak clear fluid and notices it most when lying on her back.  Denies contractions or bleeding.                        
Patient has slept well overnight. VSS. Reports scant amount of leaking overnight. No other complaints at this time.                        
Patient's vital stable and afebrile. Patient denies contractions, vaginal bleeding, decrease in fetal movement, headache, vision changes, and edema. Patient continues to have scant leaking of fluid that is clear and no foul odor. The patient has been face-timing family during the day. The fetal heart monitoring is charted in the flowsheet.   
Patient's vitals stable and afebrile. Patient denies contractions, vaginal bleeding, decrease in fetal movement, headache, and vision changes. Patient is pleasant and calm. Monitoring BG before meals and 2 hours after; the patient eats her meals later then the scheduled time in the MAR. Fetal heart monitoring charted in flowsheet.   
Pt VSS, postpartum assessment WDL. Pt pain controlled with tylenol and ibuprofen.  Pt pumping Q2-3 hrs for baby in NICU.  C/s incision TRACEY with steri strips.  Pt ambulating independently, voiding spontaneously, and tolerating regular diet.  Continue with plan of care.     Problem: Plan of Care - These are the overarching goals to be used throughout the patient stay.    Goal: Optimal Comfort and Wellbeing  Intervention: Monitor Pain and Promote Comfort  Recent Flowsheet Documentation  Taken 2/16/2023 1940 by Sharlene Bonilla, RN  Pain Management Interventions: medication (see MAR)  Taken 2/16/2023 1633 by Sharlene Bonilla, RN  Pain Management Interventions: medication (see MAR)  Taken 2/16/2023 1548 by Sharlene Bonilla, RN  Pain Management Interventions: medication (see MAR)                         
Pt denies pain, bleeding. VSS. Has occasional leaking. Blood sugars 87 pre-meal, 125 2h pp. Covered with insulin as ordered. Monitoring shows minimal variability with accels, rare decel. No contractions noted.     
Pt did well overnight, vital signs and FHR tracing WDL.  Pt reports scant clear LOF. Denies needs. Full report given to SARAH Hayward.    Problem: Prelabor Rupture of Membranes  Goal: Delayed Delivery with Absence of Infection  Outcome: Progressing   Goal Outcome Evaluation:                        
Pt has denied pain, bleeding. Still leaking scant amounts of fluid. VSS. Monitoring shows minimal-moderate variability with 10x10 accels, no decels. No contractions. Blood sugars 99 pre-meal and 113 post-prandial.     
Pt has denied pain, contractions, bleeding. Leaking small amounts of fluid. VSS. Monitoring shows moderate variability with 10x10 accels, no decels. No contractions noted. Blood sugars have been 130-140s pre and postprandial this evening, covered with insulin as ordered.   
Pt is stable. Vital sign stable, blood sugar was 79 before meal and postpartum checks within normal limits. Eating and drinking. Pt is up in the room and ambulating with no problem. Pumped and got about 20 ml of expressed breast milk. Denies pain. Medicated pt with Ibuprofen for pain control. Encouraged pt to pump every 2-3 hours. Reviewed teaching and discharge instructions with pt. Pt was given discharge medications, rental pump, gift for the baby and copies of the discharge instructions. Pt is discharged home today, but stopping in NICU to spend some time with baby.  
Reviewed OB High Intensity Insulin Infusion algorithm with charge RN. Clarified that due to a recent communication, progress toward blood glucose goal is defined as a drop of 10 mg/dL or greater. Pt is to remain on algorithm 1 at present time.  
VSS and postpartum assessments WNL. Pain adequately managed with tylenol and ibuprofen. Pumping for baby in NICU. Pt watching baby in NICU on tablet this shift. Pt wants to discharge with rental breast pump in the morning.  
VSS on RA, afebrile. Leaking clear fluid, no odour. Denies contractions, pain, bleeding, pre-eclampsia symptoms. Evening monitoring Category 1 strip, moderate variability, 15x15 accels, except 1 prolonged decel, turned to left side and resolved, extended for one hour after decel, no repeat.  AM monitoring cat 1 strip, moderate variability, 15x15 accel, prolonged decel, turned to left side, resolved, monitoring extended. No contractions noted.  BG in normal range overnight, plans to have breakfast at or after 8 am.    
VSS, afebrile.  Patient denies contractions, vaginal bleeding.  Continues to leak clear fluid, denies abdominal tenderness, foul discharge. FHT normal baseline, moderate variability, 10 x 10 accelerations. prolonged decel with additional monitoring.     
VSS, pt remains afebrile. Pt has no complaints of contractions or vaginal bleeding. Pt continues to leak scant amounts of clear, odorless fluid. 0200 BG WNL 91. Pt able to sleep through night between cares. See flow sheet for morning EFM interpretation.     
Patient

## 2023-02-17 NOTE — PROGRESS NOTES
OB Postpartum Progress Note    S: Feeling ok this morning. She feels ready to go, but is sad to be leaving with baby in the NICU. Pain is overall well controlled. Lochia improving. Tolerating po without nausea or emesis. Passing flatus. Ambulating without dizziness or lightheadedness. Voiding spontaneously without issues. Pumping for baby in NICU.     O:  Patient Vitals for the past 24 hrs:   BP Temp Temp src Pulse Resp   23 0000 101/52 98  F (36.7  C) Oral 74 16   23 1548 104/60 98.1  F (36.7  C) Oral 80 16   23 0923 103/57 98.1  F (36.7  C) Oral 69 16   23 0321 109/61 97.4  F (36.3  C) Oral 69 16     Gen: NAD. Alert, oriented. Resting comfortably in bed.  CV: Regular rate  Resp: On room air, no increased work of breathing  Abd: Soft, appropriately tender, fundus firm below the umbilicus, appropriately tender  Incision: C/D/I, steri-strips in place  Ext: trace lower extremity edema bilaterally    A/P: Dario Nathan is a 27 year old  who is POD#3 s/p STAT PLTCS for placental abruption after 20 day antepartum stay for PPROM. Pregnancy notable for type 2 diabetes. Doing well postpartum.    Type 2 Diabetes  - Endocrinology following, appreciate recs  - Currently on mSSI per endo, no meds needed at discharge per endo  - BG QID  - Check BG TID at home with PCP follow up in 3-6 weeks    Postpartum/Postop  - Pain: Continue scheduled tylenol, ibuprofen and prn oxycodone  - Heme: Hgb 11.0>>10.0, asymptomatic.  - GI: Scheduled bowel regimen. Prn anti-emetics  - : s/p Wetzel. Voiding spontaneously   - PNC: Rh pos, Rubella immune  - Pumping for baby in NICU  - Contraception: declines   - PPx: Encourage ambulation, IS, SCDs while confined to bed    Discharge to home today.    Erin William MD  OB/GYN Resident, PGY-2      Physician Attestation   I, Cynthia Whelan, personally saw and evaluated Dario Nathan.  I have reviewed the above note and agree with details and plan for discharge.    Cynthia Whelan MD  February 17, 2023

## 2023-02-21 ENCOUNTER — TELEPHONE (OUTPATIENT)
Dept: OBGYN | Facility: CLINIC | Age: 28
End: 2023-02-21
Payer: COMMERCIAL

## 2023-02-21 NOTE — TELEPHONE ENCOUNTER
PAPERWORK REQUEST    Form received on: 02/21/2023  How was form received: In Person  Preferred Provider: Dionna Berry MD  Type of form: FMLA  Date needed by: ASAP  What to do with form once completed: Please fax to [phone]  Where was form placed?: Provider basket  Has an JUVENAL been signed? Not Applicable    Additional Notes:

## 2023-02-27 ENCOUNTER — DOCUMENTATION ONLY (OUTPATIENT)
Dept: OBGYN | Facility: CLINIC | Age: 28
End: 2023-02-27
Payer: COMMERCIAL

## 2023-02-27 DIAGNOSIS — Z53.9 ERRONEOUS ENCOUNTER--DISREGARD: ICD-10-CM

## 2023-02-28 ENCOUNTER — OFFICE VISIT (OUTPATIENT)
Dept: OBGYN | Facility: CLINIC | Age: 28
End: 2023-02-28
Payer: COMMERCIAL

## 2023-02-28 VITALS
WEIGHT: 155 LBS | HEART RATE: 72 BPM | SYSTOLIC BLOOD PRESSURE: 112 MMHG | BODY MASS INDEX: 28.35 KG/M2 | TEMPERATURE: 98.9 F | DIASTOLIC BLOOD PRESSURE: 61 MMHG | OXYGEN SATURATION: 99 %

## 2023-02-28 DIAGNOSIS — Z98.891 S/P CESAREAN SECTION: Primary | ICD-10-CM

## 2023-02-28 PROCEDURE — 99024 POSTOP FOLLOW-UP VISIT: CPT | Performed by: OBSTETRICS & GYNECOLOGY

## 2023-02-28 ASSESSMENT — PATIENT HEALTH QUESTIONNAIRE - PHQ9: SUM OF ALL RESPONSES TO PHQ QUESTIONS 1-9: 2

## 2023-02-28 NOTE — PROGRESS NOTES
TATYANA Nathan is a 27 year old female presents for post operative check. She is  2  week(s) status post .  She reports doing well and denies significant pain or bleeding. Still taking the ibuprofen and tylenol now but not as scheduled.  Baby in NICU is doing okay. Had a bowel thing and needed a drain and also has something in the head, ?clot    O.  Blood pressure 112/61, pulse 72, temperature 98.9  F (37.2  C), weight 70.3 kg (155 lb), last menstrual period 2022, SpO2 99 %, currently breastfeeding.    Abd: soft, non-tender, non-distended. Incision clear, dry, and intact without evidence of infection. Healing well    A. /P. (Z98.891) S/P  section  (primary encounter diagnosis)  Comment: 2 weeks  Plan: discussed should be able to stop tylenol/ibuprofen in the next week. (she has been traveling back and forth to NICU so doing more) discussed no lifting >10 lbs for 6 weeks total. Mood is good.      Follow up as scheduled for pp visit.     Dionna Berry MD

## 2023-03-07 ENCOUNTER — DOCUMENTATION ONLY (OUTPATIENT)
Dept: OBGYN | Facility: CLINIC | Age: 28
End: 2023-03-07
Payer: COMMERCIAL

## 2023-03-13 ENCOUNTER — TELEPHONE (OUTPATIENT)
Dept: OBGYN | Facility: CLINIC | Age: 28
End: 2023-03-13
Payer: COMMERCIAL

## 2023-03-13 ENCOUNTER — DOCUMENTATION ONLY (OUTPATIENT)
Dept: CARE COORDINATION | Facility: CLINIC | Age: 28
End: 2023-03-13
Payer: COMMERCIAL

## 2023-03-13 NOTE — PROGRESS NOTES
EPDS was completed in the NICU today as part of our routine assessment at child's 1 month check in. Depression score was 3 and anxiety score was 2 with negative screen for suicidal ideation.     Copy of EPDS was given to Dario today as well as educational material about  mood and anxiety disorders.     Recommendation:   Follow up at next routine PMAD screening interval..    Dario agreed to this recommendation at this time.    Doris Lynch, [facility]  Pediatric Float    After hours and weekend pager: [phone]

## 2023-03-13 NOTE — TELEPHONE ENCOUNTER
PAPERWORK REQUEST    Form received on: 3/13/2023  How was form received: In Person  Preferred Provider: Cynthia Whelan MD  Type of form: FMLA  Date needed by: 3/17/2023  What to do with form once completed: Please fax to [phone]  Where was form placed?: Provider Basket  Has an JUVENAL been signed? Not Applicable    Additional Notes: Form placed in provider basket to be filled out and signed. Outside of surgery scheduler scope to fill out

## 2023-03-16 NOTE — TELEPHONE ENCOUNTER
Discussed medical leave specifications with patient and provider. Forms filled out and placed in provider basket for signature.

## 2023-03-22 ENCOUNTER — TELEPHONE (OUTPATIENT)
Dept: OBGYN | Facility: CLINIC | Age: 28
End: 2023-03-22
Payer: COMMERCIAL

## 2023-03-22 RX ORDER — BREAST PUMP
1 EACH MISCELLANEOUS PRN
Qty: 1 EACH | Refills: 0 | Status: SHIPPED | OUTPATIENT
Start: 2023-03-22

## 2023-03-28 ENCOUNTER — PRENATAL OFFICE VISIT (OUTPATIENT)
Dept: OBGYN | Facility: CLINIC | Age: 28
End: 2023-03-28
Payer: COMMERCIAL

## 2023-03-28 VITALS
BODY MASS INDEX: 27.07 KG/M2 | OXYGEN SATURATION: 100 % | SYSTOLIC BLOOD PRESSURE: 112 MMHG | DIASTOLIC BLOOD PRESSURE: 62 MMHG | HEART RATE: 80 BPM | WEIGHT: 148 LBS

## 2023-03-28 DIAGNOSIS — E11.9 TYPE 2 DIABETES MELLITUS WITHOUT COMPLICATION, WITHOUT LONG-TERM CURRENT USE OF INSULIN (H): ICD-10-CM

## 2023-03-28 PROCEDURE — 99207 PR POST PARTUM EXAM: CPT | Performed by: OBSTETRICS & GYNECOLOGY

## 2023-03-28 RX ORDER — ACETAMINOPHEN AND CODEINE PHOSPHATE 120; 12 MG/5ML; MG/5ML
0.35 SOLUTION ORAL DAILY
Qty: 84 TABLET | Refills: 1 | Status: SHIPPED | OUTPATIENT
Start: 2023-03-28 | End: 2023-09-27

## 2023-03-28 ASSESSMENT — PATIENT HEALTH QUESTIONNAIRE - PHQ9: SUM OF ALL RESPONSES TO PHQ QUESTIONS 1-9: 1

## 2023-03-28 NOTE — PROGRESS NOTES
Postpartum office visit         Dario Nathan is a 27 year old  who presents for a post partum visit. She had a primary CS at 27 weeks gestation due to PPROM and placental abruption, baby has had some ups and downs in the NICU but is currently stable and making progress.  Her pregnancy was complicated by type II DM and PPROM and she was discharged home after an uncomplicated hospital course.         S: since delivery she reports that her lochia is minimal. She denies fever/chills, is voiding and tolerating PO without difficulty. She denies pain. She is pumping. She denies issues with bowel/bladder. She has not resumed intercourse.  She has been coping well with having baby in the NICU and balancing her kids at home. She was discharged home without medication for her diabetes per endocrine recs and reports that overall her blood sugars are looking good but she does not have a PCP yet and is willing to get established.          O:    Vitals:    23 1320   BP: 112/62   Pulse: 80   SpO2: 100%   Weight: 67.1 kg (148 lb)       Gen: NAD  Abdomen: soft, non distended, non tender. Insidon well healed   Pelvic: deferred    Ext: non-tender, no edema         Depression Screen:  PHQ-9 of 1          A/P:    Dario Nathan is a 27 year old  who presents for post-partum visit  (Z39.2) Routine postpartum follow-up  (primary encounter diagnosis)  -Patient doing well post-partum, no concerns today  -No signs or symptoms of post-partum depression, discussed warning signs and when to call  -Risks/benefits of post-partum contraception options reviewed, patient considering IUD vs vasectomy for partner, does not think that they want more children. Handouts given for IUD and will do POPs for bridge.   -Pap smear up to date   Plan: norethindrone (MICRONOR) 0.35 MG tablet      (E11.9) Type 2 diabetes mellitus without complication, without long-term current use of insulin (H)  Encouraged pt to continue diabetic diet and checking  sugars   Plan: Primary Care Referral            Dispo: RTC for IUD placement     Anna Maki MD   03/28/23

## 2023-04-04 ENCOUNTER — MYC MEDICAL ADVICE (OUTPATIENT)
Dept: OBGYN | Facility: CLINIC | Age: 28
End: 2023-04-04
Payer: COMMERCIAL

## 2023-04-04 ENCOUNTER — TELEPHONE (OUTPATIENT)
Dept: OBGYN | Facility: CLINIC | Age: 28
End: 2023-04-04
Payer: COMMERCIAL

## 2023-04-04 NOTE — LETTER
April 6, 2023      Dario Nathan  [address] [facility] [address]        To Whom It May Concern:    Dario Nathan was seen in our clinic. She may return to work without restrictions starting on 4/10/23.      Sincerely,      Anna Maki

## 2023-04-04 NOTE — LETTER
April 4, 2023      Dario Nathan  [address] [facility] [address]        To Whom It May Concern:    Dario Nathan was seen in our clinic. She may return to {WORK OR SCHOOL OR :818851} without restrictions.      Sincerely,        Marija Tejeda RN

## 2023-04-04 NOTE — TELEPHONE ENCOUNTER
Reason for call:  Other   Patient called regarding (reason for call): call back  Additional comments: Pt requesting letter to return to work.    Phone number to reach patient:  Home number on file [phone] (home)    Best Time:  Any    Can we leave a detailed message on this number?  YES    Travel screening: Not Applicable

## 2023-04-26 ENCOUNTER — DOCUMENTATION ONLY (OUTPATIENT)
Dept: CARE COORDINATION | Facility: CLINIC | Age: 28
End: 2023-04-26
Payer: COMMERCIAL

## 2023-04-26 NOTE — PROGRESS NOTES
EPDS was completed in the NICU today as part of our routine assessment at [facility]'s 2 month check in. Depression score was 2 and anxiety score was 1 with negative screen for suicidal ideation.     Copy of EPDS was given to Dario today as well as educational material about  mood and anxiety disorders.     Recommendation:   Follow up at next routine PMAD screening interval.      Dario agreed to this recommendation at this time.    CONNIE Virgen [facility]  Clinical   Maternal Child Health  Phone:  [phone]  Pager:  [phone]

## 2023-04-29 ENCOUNTER — HEALTH MAINTENANCE LETTER (OUTPATIENT)
Age: 28
End: 2023-04-29

## 2023-06-03 ENCOUNTER — HEALTH MAINTENANCE LETTER (OUTPATIENT)
Age: 28
End: 2023-06-03

## 2023-06-19 ENCOUNTER — DOCUMENTATION ONLY (OUTPATIENT)
Dept: CARE COORDINATION | Facility: CLINIC | Age: 28
End: 2023-06-19
Payer: COMMERCIAL

## 2023-06-19 NOTE — PROGRESS NOTES
EPDS was completed in the NICU today as part of our routine assessment at [facility]'s 4 month check in. Depression score was 2 and anxiety score was 0 with negative screen for suicidal ideation.     Copy of EPDS was given to Dario today as well as educational material about  mood and anxiety disorders.     Recommendation:   Follow up at next routine PMAD screening interval..    Dario agreed to this recommendation at this time.    CONNIE Virgen [facility]  Clinical   Maternal Child Health  Phone:  [phone]  Pager:  [phone]

## 2023-08-16 ENCOUNTER — DOCUMENTATION ONLY (OUTPATIENT)
Dept: CARE COORDINATION | Facility: CLINIC | Age: 28
End: 2023-08-16
Payer: COMMERCIAL

## 2023-08-21 ENCOUNTER — LACTATION ENCOUNTER (OUTPATIENT)
Age: 28
End: 2023-08-21

## 2023-08-21 NOTE — LACTATION NOTE
"This note was copied from a baby's chart.  LACTATION DISCHARGE INSTRUCTIONS      Congratulations on your approaching discharge day!  Our goal is to help you have all the information, skills and equipment you need to help you meet your lactation goals at home.        CDC HANDOUT ON STORING AND PREPARING HUMAN MILK AT HOME    Please see attached handout   https://www.cdc.gov/breastfeeding/recommendations/handling_breastmilk.htm      FEEDING LOG: BABY'S FIRST WEEK, SECOND WEEK AND BEYOND    Please see attached feeding logs  Goal is to eat at least 8 times in 24 hours  Goal is to have at least 6 wet diapers in 24 hours  Talk to your provider about goal for soiled diapers.  Each baby is different depending on age and what they are eating      OTHER DISCHARGE INFORMATION    Medications:   Some women may find certain types of hormonal birth control, decongestants or antihistamines may impact supply-- talk to your provider.  Always get a second opinion from a lactation consultant or a provider familiar with lactation if told to stop latching or \"pump and dump\" when starting a new medication, having a procedure or you are ill; most of the time things are compatible.      HOW TO WEAN FROM THE PUMP    Your milk supply may be greater than what your baby needs after discharge. It is important that you gradually wean from pumping. If you wean too quickly, you will be uncomfortable and you run the risk of causing your supply to drop.    If you have been pumping less than two weeks:    If you are uncomfortable after a full breastfeeding, pump only until you are comfortable (versus pumping until empty)      If you have been pumping two weeks or more:    Continue to pump but gradually decrease the time or volume you pump.   Example based on time: If you have been pumping 20 minutes, decrease to 18 minutes for two days. If still comfortable, decrease to 16 minutes for another two days.   Example based on volume: If you normally pump 2 " "oz over what your baby needs, pump 1.75 oz for a few days, 1.5 oz for a few days, etc        OTHER LATCHING INFORMATION    Growth Spurts: Common times for \"growth spurts\" are around 7-10 days, 2-3 weeks, 4-6 weeks, 3 months, 4 months, 6 months and 9 months, but these vary widely between babies.  During these times allow your baby to nurse very frequently (or pump more frequently) to temporarily boost your supply, as opposed to supplementing.  It should pass in a few days when your supply increases, and your baby will settle into a new feeding pattern.    How to get a breastfeeding test weight scale:   Rental (2ml sensitivity):   GPal ([facility]) [phone]   [place] DecaWave ([facility]) [phone]  [place]umpass Transparency SoftwareConnelly) [phone]   Purchase scale (6ml sensitivity):   \"Albrecht Baby Scale\" (Target, Amazon, etc), around $150      LACTATION SUPPORT    [place] Lactation Resources:   MAGALI Steiner, CNM, IBCLC  Tuesday:  [facility],  8:30 - 5:00,  [phone]  Wednesday:  [facility], 7th floor, 8:30 - 4:00, [phone]  Thursday:  [facility], [facility], 8:30 - 4:00,  [phone]    Breastfeeding Connection at [facility]  [phone]   Breastfeeding Connection at [facility]   [phone]  [facility] Lact[facility]    [phone]  [facility]      [phone]  [facility]      [phone]  [facility] [place] [facility]      [phone]    [facility]       [phone]  [facility]       [phone]      Other Lactation Help:  Meg Parenting Radha/ [place] (Tues/Wed)     [phone]  [place] Baby Weigh In (various times and locations)    www.Healarium.Scan & Target ++HAS VIRTUAL SUPPORT++   Rei Salazar   www.CREATIVConsignd [phone]  Everyday Miracles         https://www.everyday-miracles.org/  [facility] [address] " "[place]     [phone] ++HAS VIRTUAL SUPPORT++   Naina Osborn DO, MPH, ABO, IBCLC  Integrative Family Medicine Physician/Breastfeeding Medicine/ Home visits  www.Eyebrid Blaze  [phone]  [facility] \"Well Fed\" postpartum group ([facility])   [phone]  Support in other languages:  Macanese:  Roberta (IBCLC/ Macanese) [phone]  tan@[email or web address].  La Leche League: Si quieres ayuda en espanol con brad pecho por favor llama Carley al [phone].  Bijal Para [facility] & [place]  For more information call [facility] [phone] or Isamar at [phone] ([place]) or Marcy Ramírez at [phone] ([place]).  [place]: Fridays, 10:30 am to noon. [facility] [place] [address]: Wednesdays, 1:00-2:00 PM. [facility], [address] [place] [address]  Zoua (Hmong) [phone]  Roni (Tristanian) [phone]   breastfeeding support:  [facility] ([place])     www.[facility] HospitalbirthFitVia  [phone]  Chocolate Milk Club:    http://www.EnbridgelsmidwiferPhonitive - Touchalizeervices.com/chocolate-milk-club/  Dr. Carolyn Valladares, [phone]  DIVA Moms (Dynamic Involved Valued  Moms)   [phone]  Customer.io  ([phone] or Cribspot@Friendly Score.View Medical  https://www.51wan.com/Blackfamiliesdobreastfeed  [facility] Breastfeeding Coalition:  See Facebook site  hmongbf@Friendly Score.com Zhane Scott [phone]  [place] Indigenous Breastfeeding [place]      See Facebook site or Google \"Kalli Grant\"  [email or web address] [email or web address]  Marilu Singer [phone]   [facility], [email or web address]       [facility] Lactation Support  [facility], Pediatrics & Adolescent Medicine: [phone], ext. [phone]. Outpatient appointments, phone assist   [facility], [phone]. Outpatient appointments, phone assist   [facility] " [place], [phone]. Inpatient services, outpatient appointments, phone assist   [facility] [place], Inpatient services only   [facility], [phone]. Inpatient services, outpatient appointments, phone assist, 24-hour availability   [facility], [phone]. Inpatient services, outpatient appointments, phone assist   [facility], [phone], ext. [phone]. Inpatient services, phone assist -- Hours: 7 a.m. to 3:30 p.m. every day. After hours: Messages will be returned within 24 hours.    Telephone and Online Support    [place] ++HAS VIRTUAL SUPPORT++ (call for eligibility information)   [phone]    BabyCafes (www.babycafeusa.org) (now in person)    [place]ec[place] InternetArray   ++HAS VIRTUAL SUPPORT++  www.Fly Taxili.org  1-926-7-LA-LECHE ([phone])  Local referral line [phone]  Si quieres ayuda en espanol con brad pecho por favor llama Carley al [phone].    KellyMom-- up to date lactation information  Www.Buyoo.Promotion Space Group    International Breastfeeding [place] (Marco Antonio Owen)  Http://ibconline.ca/    The InfantRisk Call Center is available to answer questions about the use of medications during pregnancy and while breastfeeding  [phone]  www.infantSpreecast.Promotion Space Group     Office on Women's Health National Breastfeeding Help Line  8am to 5pm, English and Faroese [phone] option 1    https://www.womenshealth.gov/breastfeeding/ Zjkc1Ramx Jody (free on iPIKANO Communications jody store or Google Play)    LactMed Jody (free on bLife jody store or Google Play) LactMed is available online at https://toxnet.nlm.nih.gov/newtoxnet/lactmed.htm and is now available on your mobile device. The free LactMed Jody for iPhone/iPod Touch and Android can be downloaded at http://toxnet.nlm.nih.gov/help/lactmedapp.htm.

## 2023-09-27 ENCOUNTER — MYC REFILL (OUTPATIENT)
Dept: OBGYN | Facility: CLINIC | Age: 28
End: 2023-09-27
Payer: COMMERCIAL

## 2023-09-27 RX ORDER — ACETAMINOPHEN AND CODEINE PHOSPHATE 120; 12 MG/5ML; MG/5ML
0.35 SOLUTION ORAL DAILY
Qty: 84 TABLET | Refills: 1 | Status: SHIPPED | OUTPATIENT
Start: 2023-09-27

## 2023-09-30 ENCOUNTER — HEALTH MAINTENANCE LETTER (OUTPATIENT)
Age: 28
End: 2023-09-30

## 2023-12-06 ENCOUNTER — DOCUMENTATION ONLY (OUTPATIENT)
Dept: LAB | Facility: CLINIC | Age: 28
End: 2023-12-06
Payer: COMMERCIAL

## 2023-12-06 DIAGNOSIS — Z01.89 ROUTINE LAB DRAW: Primary | ICD-10-CM

## 2023-12-07 ENCOUNTER — LAB (OUTPATIENT)
Dept: LAB | Facility: CLINIC | Age: 28
End: 2023-12-07
Payer: COMMERCIAL

## 2023-12-07 DIAGNOSIS — Z01.89 ROUTINE LAB DRAW: ICD-10-CM

## 2023-12-07 LAB — HBA1C MFR BLD: 6.5 % (ref 0–5.6)

## 2023-12-07 PROCEDURE — 83036 HEMOGLOBIN GLYCOSYLATED A1C: CPT

## 2023-12-07 PROCEDURE — 36415 COLL VENOUS BLD VENIPUNCTURE: CPT

## 2024-04-27 ENCOUNTER — HEALTH MAINTENANCE LETTER (OUTPATIENT)
Age: 29
End: 2024-04-27

## 2024-07-06 ENCOUNTER — HEALTH MAINTENANCE LETTER (OUTPATIENT)
Age: 29
End: 2024-07-06

## 2024-09-09 NOTE — PROGRESS NOTES
EPDS was completed in the NICU today as part of our routine assessment at [facility]'s 4 month check in. Depression score was 4 and anxiety score was 2 with negative screen for suicidal ideation.     Copy of EPDS was given to Dario today as well as educational material about  mood and anxiety disorders.     Recommendation:   Follow up at next routine PMAD screening interval..    Dario  agreed to this recommendation at this time.     CONNIE Virgen [facility]  Clinical   Maternal Child Health  Phone:  [phone]  Pager:  [phone]   
hard copy, drawn during this pregnancy

## 2024-09-14 ENCOUNTER — HEALTH MAINTENANCE LETTER (OUTPATIENT)
Age: 29
End: 2024-09-14

## 2024-12-06 ENCOUNTER — OFFICE VISIT (OUTPATIENT)
Dept: INTERNAL MEDICINE | Facility: CLINIC | Age: 29
End: 2024-12-06
Payer: COMMERCIAL

## 2024-12-06 VITALS
SYSTOLIC BLOOD PRESSURE: 112 MMHG | BODY MASS INDEX: 29.09 KG/M2 | HEIGHT: 62 IN | RESPIRATION RATE: 14 BRPM | HEART RATE: 75 BPM | DIASTOLIC BLOOD PRESSURE: 71 MMHG | OXYGEN SATURATION: 98 % | WEIGHT: 158.1 LBS

## 2024-12-06 DIAGNOSIS — M25.562 ARTHRALGIA OF BOTH KNEES: ICD-10-CM

## 2024-12-06 DIAGNOSIS — E11.9 TYPE 2 DIABETES MELLITUS WITHOUT COMPLICATION, WITHOUT LONG-TERM CURRENT USE OF INSULIN (H): ICD-10-CM

## 2024-12-06 DIAGNOSIS — R20.2 PARESTHESIA: ICD-10-CM

## 2024-12-06 DIAGNOSIS — M25.561 ARTHRALGIA OF BOTH KNEES: ICD-10-CM

## 2024-12-06 DIAGNOSIS — Z00.00 ENCOUNTER FOR PREVENTIVE HEALTH EXAMINATION: Primary | ICD-10-CM

## 2024-12-06 LAB
ALBUMIN SERPL BCG-MCNC: 4.4 G/DL (ref 3.5–5.2)
ALP SERPL-CCNC: 85 U/L (ref 40–150)
ALT SERPL W P-5'-P-CCNC: 29 U/L (ref 0–50)
ANION GAP SERPL CALCULATED.3IONS-SCNC: 12 MMOL/L (ref 7–15)
AST SERPL W P-5'-P-CCNC: 39 U/L (ref 0–45)
BASOPHILS # BLD AUTO: 0 10E3/UL (ref 0–0.2)
BASOPHILS NFR BLD AUTO: 0 %
BILIRUB SERPL-MCNC: 0.5 MG/DL
BUN SERPL-MCNC: 12.7 MG/DL (ref 6–20)
CALCIUM SERPL-MCNC: 9.3 MG/DL (ref 8.8–10.4)
CHLORIDE SERPL-SCNC: 102 MMOL/L (ref 98–107)
CHOLEST SERPL-MCNC: 246 MG/DL
CREAT SERPL-MCNC: 0.75 MG/DL (ref 0.51–0.95)
CRP SERPL-MCNC: 9.27 MG/L
EGFRCR SERPLBLD CKD-EPI 2021: >90 ML/MIN/1.73M2
EOSINOPHIL # BLD AUTO: 0.1 10E3/UL (ref 0–0.7)
EOSINOPHIL NFR BLD AUTO: 1 %
ERYTHROCYTE [DISTWIDTH] IN BLOOD BY AUTOMATED COUNT: 12.3 % (ref 10–15)
ERYTHROCYTE [SEDIMENTATION RATE] IN BLOOD BY WESTERGREN METHOD: 21 MM/HR (ref 0–20)
EST. AVERAGE GLUCOSE BLD GHB EST-MCNC: 169 MG/DL
FASTING STATUS PATIENT QL REPORTED: YES
FASTING STATUS PATIENT QL REPORTED: YES
GLUCOSE SERPL-MCNC: 93 MG/DL (ref 70–99)
HBA1C MFR BLD: 7.5 % (ref 0–5.6)
HCO3 SERPL-SCNC: 24 MMOL/L (ref 22–29)
HCT VFR BLD AUTO: 39.4 % (ref 35–47)
HDLC SERPL-MCNC: 56 MG/DL
HGB BLD-MCNC: 13.3 G/DL (ref 11.7–15.7)
IMM GRANULOCYTES # BLD: 0 10E3/UL
IMM GRANULOCYTES NFR BLD: 0 %
LDLC SERPL CALC-MCNC: 167 MG/DL
LYMPHOCYTES # BLD AUTO: 2.6 10E3/UL (ref 0.8–5.3)
LYMPHOCYTES NFR BLD AUTO: 35 %
MCH RBC QN AUTO: 27.4 PG (ref 26.5–33)
MCHC RBC AUTO-ENTMCNC: 33.8 G/DL (ref 31.5–36.5)
MCV RBC AUTO: 81 FL (ref 78–100)
MONOCYTES # BLD AUTO: 0.5 10E3/UL (ref 0–1.3)
MONOCYTES NFR BLD AUTO: 7 %
NEUTROPHILS # BLD AUTO: 4.3 10E3/UL (ref 1.6–8.3)
NEUTROPHILS NFR BLD AUTO: 57 %
NONHDLC SERPL-MCNC: 190 MG/DL
PLATELET # BLD AUTO: 331 10E3/UL (ref 150–450)
POTASSIUM SERPL-SCNC: 4.2 MMOL/L (ref 3.4–5.3)
PROT SERPL-MCNC: 7.6 G/DL (ref 6.4–8.3)
RBC # BLD AUTO: 4.85 10E6/UL (ref 3.8–5.2)
SODIUM SERPL-SCNC: 138 MMOL/L (ref 135–145)
TRIGL SERPL-MCNC: 116 MG/DL
TSH SERPL DL<=0.005 MIU/L-ACNC: 1.45 UIU/ML (ref 0.3–4.2)
VIT B12 SERPL-MCNC: 998 PG/ML (ref 232–1245)
WBC # BLD AUTO: 7.5 10E3/UL (ref 4–11)

## 2024-12-06 PROCEDURE — 82607 VITAMIN B-12: CPT | Performed by: NURSE PRACTITIONER

## 2024-12-06 PROCEDURE — 90656 IIV3 VACC NO PRSV 0.5 ML IM: CPT | Performed by: NURSE PRACTITIONER

## 2024-12-06 PROCEDURE — 80053 COMPREHEN METABOLIC PANEL: CPT | Performed by: NURSE PRACTITIONER

## 2024-12-06 PROCEDURE — 85652 RBC SED RATE AUTOMATED: CPT | Performed by: NURSE PRACTITIONER

## 2024-12-06 PROCEDURE — 80061 LIPID PANEL: CPT | Performed by: NURSE PRACTITIONER

## 2024-12-06 PROCEDURE — 85025 COMPLETE CBC W/AUTO DIFF WBC: CPT | Performed by: NURSE PRACTITIONER

## 2024-12-06 PROCEDURE — 86038 ANTINUCLEAR ANTIBODIES: CPT | Performed by: NURSE PRACTITIONER

## 2024-12-06 PROCEDURE — 99214 OFFICE O/P EST MOD 30 MIN: CPT | Mod: 25 | Performed by: NURSE PRACTITIONER

## 2024-12-06 PROCEDURE — 99385 PREV VISIT NEW AGE 18-39: CPT | Mod: 25 | Performed by: NURSE PRACTITIONER

## 2024-12-06 PROCEDURE — 86140 C-REACTIVE PROTEIN: CPT | Performed by: NURSE PRACTITIONER

## 2024-12-06 PROCEDURE — 84443 ASSAY THYROID STIM HORMONE: CPT | Performed by: NURSE PRACTITIONER

## 2024-12-06 PROCEDURE — 83036 HEMOGLOBIN GLYCOSYLATED A1C: CPT | Performed by: NURSE PRACTITIONER

## 2024-12-06 PROCEDURE — 36415 COLL VENOUS BLD VENIPUNCTURE: CPT | Performed by: NURSE PRACTITIONER

## 2024-12-06 PROCEDURE — 90471 IMMUNIZATION ADMIN: CPT | Performed by: NURSE PRACTITIONER

## 2024-12-06 SDOH — HEALTH STABILITY: PHYSICAL HEALTH: ON AVERAGE, HOW MANY MINUTES DO YOU ENGAGE IN EXERCISE AT THIS LEVEL?: 0 MIN

## 2024-12-06 SDOH — HEALTH STABILITY: PHYSICAL HEALTH: ON AVERAGE, HOW MANY DAYS PER WEEK DO YOU ENGAGE IN MODERATE TO STRENUOUS EXERCISE (LIKE A BRISK WALK)?: 0 DAYS

## 2024-12-06 ASSESSMENT — SOCIAL DETERMINANTS OF HEALTH (SDOH)
HOW OFTEN DO YOU GET TOGETHER WITH FRIENDS OR RELATIVES?: THREE TIMES A WEEK
HOW OFTEN DO YOU GET TOGETHER WITH FRIENDS OR RELATIVES?: THREE TIMES A WEEK

## 2024-12-06 ASSESSMENT — PAIN SCALES - GENERAL: PAINLEVEL_OUTOF10: NO PAIN (0)

## 2024-12-06 NOTE — PROGRESS NOTES
"  Assessment & Plan     Encounter for preventive health examination  Counseling: counseling provided regarding nutrition, regular exercise, general safety and periodic health exams   Flu shot today  Labs today  Pap up to date    - Hemoglobin A1c; Future  - Lipid panel reflex to direct LDL Fasting; Future  - Comprehensive metabolic panel (BMP + Alb, Alk Phos, ALT, AST, Total. Bili, TP); Future  - CBC with platelets and differential; Future  - Hemoglobin A1c  - Lipid panel reflex to direct LDL Fasting  - Comprehensive metabolic panel (BMP + Alb, Alk Phos, ALT, AST, Total. Bili, TP)  - CBC with platelets and differential    Arthralgia of both knees  -check labs     - CRP, inflammation; Future  - ESR: Erythrocyte sedimentation rate; Future  - Anti Nuclear [place]ana IgG by IFA with Reflex; Future  - CRP, inflammation  - ESR: Erythrocyte sedimentation rate  - Anti Nuclear [place]ana IgG by IFA with Reflex    Type 2 diabetes mellitus without complication, without long-term current use of insulin (H)  -check labs today    - Hemoglobin A1c; Future  - Lipid panel reflex to direct LDL Fasting; Future  - Comprehensive metabolic panel (BMP + Alb, Alk Phos, ALT, AST, Total. Bili, TP); Future  - CBC with platelets and differential; Future  - Hemoglobin A1c  - Lipid panel reflex to direct LDL Fasting  - Comprehensive metabolic panel (BMP + Alb, Alk Phos, ALT, AST, Total. Bili, TP)  - CBC with platelets and differential    Paresthesia    - TSH with free T4 reflex; Future  - Vitamin B12; Future  - TSH with free T4 reflex  - Vitamin B12        BMI  Estimated body mass index is 28.92 kg/m  as calculated from the following:    Height as of this encounter: 1.575 m (5' 2\").    Weight as of this encounter: 71.7 kg (158 lb 1.6 oz).       Counseling  Appropriate preventive services were addressed with this patient via screening, questionnaire, or discussion as appropriate for fall prevention, nutrition, physical activity, Tobacco-use cessation, social " "engagement, weight loss and cognition.  Checklist reviewing preventive services available has been given to the patient.  Reviewed patient's diet, addressing concerns and/or questions.   She is at risk for psychosocial distress and has been provided with information to reduce risk.           Angela Bishop is a 29 year old, presenting for the following health issues:  Physical (fasting)        12/6/2024     3:21 PM   Additional Questions   Roomed by azamit   Accompanied by self         12/6/2024     3:21 PM   Patient Reported Additional Medications   Patient reports taking the following new medications none     ERIC Bishop is here today for physical.  She also reports she has been having a lot of joint pain in her knees and difficulty getting up from kneeling to standing.  She reports that it will take her a while to get up and then it will take her a minute before she can walk.  She does notice popping in her knees at times.  Does not have significant pain in her knees just has difficulty with mobility.  She also reports history of diabetes, she is not currently on any medication for this.  She is not currently checking her blood sugars.                  Objective    /71 (BP Location: Right arm, Patient Position: Sitting, Cuff Size: Adult Regular)   Pulse 75   Resp 14   Ht 1.575 m (5' 2\")   Wt 71.7 kg (158 lb 1.6 oz)   LMP 12/01/2024 (Exact Date)   SpO2 98%   BMI 28.92 kg/m    Body mass index is 28.92 kg/m .  Physical Exam  Vitals and nursing note reviewed.   Constitutional:       General: She is not in acute distress.     Appearance: Normal appearance. She is not ill-appearing.   HENT:      Head: Normocephalic.      Mouth/Throat:      Mouth: Mucous membranes are moist.      Pharynx: Oropharynx is clear.   Eyes:      Extraocular Movements: Extraocular movements intact.      Conjunctiva/sclera: Conjunctivae normal.      Pupils: Pupils are equal, round, and reactive to light.   Cardiovascular:      " Rate and Rhythm: Normal rate and regular rhythm.      Heart sounds: Normal heart sounds.   Pulmonary:      Effort: Pulmonary effort is normal. No respiratory distress.      Breath sounds: Normal breath sounds. No wheezing.   Abdominal:      General: Bowel sounds are normal.      Palpations: Abdomen is soft.   Musculoskeletal:      Right knee: No swelling. Normal range of motion.      Left knee: No swelling. Normal range of motion.   Neurological:      General: No focal deficit present.      Mental Status: She is alert and oriented to person, place, and time. Mental status is at baseline.   Psychiatric:         Mood and Affect: Mood normal.         Behavior: Behavior normal.                    Signed Electronically by: Hortensia Mane, CNP

## 2024-12-09 LAB — ANA SER QL IF: NEGATIVE

## 2024-12-10 ENCOUNTER — PATIENT OUTREACH (OUTPATIENT)
Dept: CARE COORDINATION | Facility: CLINIC | Age: 29
End: 2024-12-10
Payer: COMMERCIAL

## 2024-12-12 ENCOUNTER — PATIENT OUTREACH (OUTPATIENT)
Dept: CARE COORDINATION | Facility: CLINIC | Age: 29
End: 2024-12-12
Payer: COMMERCIAL

## 2025-01-15 ENCOUNTER — VIRTUAL VISIT (OUTPATIENT)
Dept: EDUCATION SERVICES | Facility: CLINIC | Age: 30
End: 2025-01-15
Payer: COMMERCIAL

## 2025-01-15 DIAGNOSIS — E11.9 TYPE 2 DIABETES MELLITUS WITHOUT COMPLICATION, WITHOUT LONG-TERM CURRENT USE OF INSULIN (H): Primary | ICD-10-CM

## 2025-01-15 PROCEDURE — G0108 DIAB MANAGE TRN  PER INDIV: HCPCS | Mod: 95 | Performed by: NUTRITIONIST

## 2025-01-15 NOTE — PROGRESS NOTES
Diabetes Self-Management Education & Support    Presents for: Follow-up    Type of service:  Video Visit    If the video visit is dropped, the video visit invitation should be resent by: Text to cell phone: [phone]    Originating Location (pt. Location): Home  Distant Location (provider location): [facility]  Mode of Communication:  Video Conference via [employer]    Video Start Time:  9:30 AM  Video End Time (time video stopped): 10:02 AM    How would patient like to obtain AVS? MyChart      Assessment  Dario has been monitoring blood glucose levels twice daily.  Fasting blood sugar are ranging from 123-160 mg/dL and 2 hr pp readings ranging from 112-205 mg/dL.  She reports that she has missed taking her Metformin a few days/week since starting but has set alarm on phone and placed medication in kitchen to help with reminders.  She reports that she is watching carbohydrate intake and trying to make healthier food choices.  Reports 4 # weight loss in 4 weeks.  She started using a treadmill 5 days/week for 30 minutes.  She reports some nausea after taking her Metformin, she is taking this with dinner.  We will meet again in 4 weeks and if this continues plan will be to change to ER version.      Patient's most recent   Lab Results   Component Value Date    A1C 7.5 12/06/2024    A1C 5.8 03/19/2012     is not meeting goal of <7.0    Diabetes knowledge and skills assessment:   Patient is knowledgeable in diabetes management concepts related to: Healthy Eating, Being Active, Monitoring, Taking Medication, Problem Solving, Reducing Risks, and Healthy Coping    Based on learning assessment above, most appropriate setting for further diabetes education would be: Individual setting.    Care Plan and Education Provided:  Taking Medication: When to take medication(s), Problem Solving: Sick day arrangements, and Reducing Risks: Complications of diabetes, Dental care, Eye care, Foot care,  Goal for A1c, how it relates to glucose and how often to check, Preventing cardiovascular disease, including blood pressure goals, lipid goals, recommendations for cardioprotective medications, statins, and aspirin, and Prevention, early diagnostic measures and treatment of complications    Patient verbalized understanding of diabetes self-management education concepts discussed, opportunities for ongoing education and support, and recommendations provided today.    Plan    1) Continue with current activity levels  2) Continue with current monitoring  3) Make appointment for dental cleaning    Topics to cover at upcoming visits: Continuation of Lifestyle Changes     Follow-up:  Upcoming Diabetes Ed Appointments     Visit Type Date Time Department    DIABETES ED 1/15/2025  9:30 AM  DIABETES ED    DIABETES ED 2/12/2025  9:30 AM  DIABETES ED        See Care Plan for co-developed, patient-state behavior change goals.    Education Materials Provided:  No new materials provided today      Subjective/Objective  Dario is an 29 year old year old, presenting for the following diabetes education related to: Presents for: Follow-up  Accompanied by: Self  Diabetes education in the past 24mo: Yes  Focus of Visit: Reducing Risks, Taking Medication, Problem Solving  Diabetes type: Type 2  Disease course: Stable  How confident are you filling out medical forms by yourself:: Extremely  Transportation concerns: No  Difficulty affording diabetes medication?: No  Difficulty affording diabetes testing supplies?: No  Other concerns:: None  Cultural Influences/Ethnic Background:   or     Diabetes Symptoms & Complications:  Diabetes Related Symptoms: Fatigue, Polyphagia (increased hunger)  Weight trend: Stable  Symptom course: Improving  Disease course: Stable  Complications assessed today?: Yes  Autonomic neuropathy: No  CVA: No  Heart disease: No  Nephropathy: No  Peripheral neuropathy: No  Peripheral Vascular Disease:  "No  Retinopathy: No  Sexual dysfunction: No    Patient Problem List and Family Medical History reviewed for relevant medical history, current medical status, and diabetes risk factors.    Vitals:  LMP 12/01/2024 (Exact Date)   Estimated body mass index is 28.92 kg/m  as calculated from the following:    Height as of 12/6/24: 1.575 m (5' 2\").    Weight as of 12/6/24: 71.7 kg (158 lb 1.6 oz).   Last 3 BP:   BP Readings from Last 3 Encounters:   12/06/24 112/71   03/28/23 112/62   02/28/23 112/61       History   Smoking Status    Never   Smokeless Tobacco    Never       Labs:  Lab Results   Component Value Date    A1C 7.5 12/06/2024    A1C 5.8 03/19/2012     Lab Results   Component Value Date    GLC 93 12/06/2024    GLC 79 02/17/2023     Lab Results   Component Value Date     12/06/2024     10/17/2018     HDL Cholesterol   Date Value Ref Range Status   10/17/2018 56 >49 mg/dL Final     Direct Measure HDL   Date Value Ref Range Status   12/06/2024 56 >=50 mg/dL Final   ]  GFR Estimate   Date Value Ref Range Status   12/06/2024 >90 >60 mL/min/1.73m2 Final     Comment:     eGFR calculated using 2021 CKD-EPI equation.     No results found for: \"GFRESTBLACK\"  Lab Results   Component Value Date    CR 0.75 12/06/2024     No results found for: \"MICROALBUMIN\"    Healthy Eating:  Healthy Eating Assessed Today: Yes  Cultural/Scientologist diet restrictions?: No  Do you have any food allergies or intolerances?: No  Meal planning/habits: Carb counting  Who cooks/prepares meals for you?: Self  Who purchases food in  your home?: Self  How many times a week on average do you eat food made away from home (restaurant/take-out)?: 1 (fast food: [employer] or [employer] or Mexican restaurant)  Meals include: Lunch, Dinner  Breakfast: skips or smoothie with unsweetened oat milk, yeyo seeds, handful blueberries, spinach and 1/4 banana  Lunch: 12-1PM;  1 egg with egg whites and 1/2 avocado with 2 ww toast and coffee  OR leftover " chicken, rice or noodles OR eggs with beans  Dinner: Meat or chicken and beans and rice (white) and mixed vegetables  Snacks: sweet tooth:  cookies, ice cream and chips  (cut back and trying to stay at one serving)  Beverages: Water, Coffee  Has patient met with a dietitian in the past?: Yes (during GDM)    Being Active:  Being Active Assessed Today: Yes  Exercise:: Yes (active job & treadmill)  Days per week of moderate to strenuous exercise (like a brisk walk): 5  On average, minutes per day of exercise at this level: 30  How intense was your typical exercise? : Moderate (like brisk walking)  Exercise Minutes per Week: 150  Barrier to exercise: Time    Taking Medications:  Diabetes Medication(s)       Biguanides       metFORMIN (GLUCOPHAGE) 500 MG tablet Take 1 tablet (500 mg) by mouth daily (with dinner).          Taking Medication Assessed Today: Yes  Current Treatments: Oral Medication (taken by mouth), Diet  Problems taking diabetes medications regularly?: Yes (has forgotten dose a few days/week since starting)  Diabetes medication side effects?: Yes (reports nausea occasionally after taking Metformin)    Problem Solving:  Problem Solving Assessed Today: Yes  Is the patient at risk for hypoglycemia?: No  Is the patient at risk for DKA?: No  Does patient have severe weather/disaster plan for diabetes management?: No  Does patient have sick day plan for diabetes management?: No        Reducing Risks:  Reducing Risks Assessed Today: Yes  Diabetes Risks: Family History  Additional female risks: Gestational Diabetes  CAD Risks: Diabetes Mellitus  Has dilated eye exam at least once a year?: No  Sees dentist every 6 months?: Yes  Feet checked by healthcare provider in the last year?: No    Healthy Coping:  Healthy Coping Assessed Today: Yes  Emotional response to diabetes: Acceptance  Informal Support system:: Spouse, Friends, Family, Children  Stage of change: ACTION (Actively working towards change)  Support  resources: Websites    Naina Knott RDN, ONEYDA, [facility]   Time Spent: 30 minutes  Encounter Type: Individual    Any diabetes medication dose changes were made via the [facility] Standing Orders under the patient's referring provider.

## 2025-01-15 NOTE — LETTER
1/15/2025         RE: Dario Nathan  [address] [place] [facility] [address]        Dear Colleague,    Thank you for referring your patient, Dario Nathan, to the [facility]. Please see a copy of my visit note below.    Diabetes Self-Management Education & Support    Presents for: Follow-up    Type of service:  Video Visit    If the video visit is dropped, the video visit invitation should be resent by: Text to cell phone: [phone]    Originating Location (pt. Location): Home  Distant Location (provider location): [facility]  Mode of Communication:  Video Conference via [employer]    Video Start Time:  9:30 AM  Video End Time (time video stopped): 10:02 AM    How would patient like to obtain AVS? MyChart      Assessment  Dario has been monitoring blood glucose levels twice daily.  Fasting blood sugar are ranging from 123-160 mg/dL and 2 hr pp readings ranging from 112-205 mg/dL.  She reports that she has missed taking her Metformin a few days/week since starting but has set alarm on phone and placed medication in kitchen to help with reminders.  She reports that she is watching carbohydrate intake and trying to make healthier food choices.  Reports 4 # weight loss in 4 weeks.  She started using a treadmill 5 days/week for 30 minutes.  She reports some nausea after taking her Metformin, she is taking this with dinner.  We will meet again in 4 weeks and if this continues plan will be to change to ER version.      Patient's most recent   Lab Results   Component Value Date    A1C 7.5 12/06/2024    A1C 5.8 03/19/2012     is not meeting goal of <7.0    Diabetes knowledge and skills assessment:   Patient is knowledgeable in diabetes management concepts related to: Healthy Eating, Being Active, Monitoring, Taking Medication, Problem Solving, Reducing Risks, and Healthy Coping    Based on learning assessment above, most appropriate setting for further  diabetes education would be: Individual setting.    Care Plan and Education Provided:  Taking Medication: When to take medication(s), Problem Solving: Sick day arrangements, and Reducing Risks: Complications of diabetes, Dental care, Eye care, Foot care, Goal for A1c, how it relates to glucose and how often to check, Preventing cardiovascular disease, including blood pressure goals, lipid goals, recommendations for cardioprotective medications, statins, and aspirin, and Prevention, early diagnostic measures and treatment of complications    Patient verbalized understanding of diabetes self-management education concepts discussed, opportunities for ongoing education and support, and recommendations provided today.    Plan    1) Continue with current activity levels  2) Continue with current monitoring  3) Make appointment for dental cleaning    Topics to cover at upcoming visits: Continuation of Lifestyle Changes     Follow-up:  Upcoming Diabetes Ed Appointments     Visit Type Date Time Department    DIABETES ED 1/15/2025  9:30 AM  DIABETES ED    DIABETES ED 2/12/2025  9:30 AM  DIABETES ED        See Care Plan for co-developed, patient-state behavior change goals.    Education Materials Provided:  No new materials provided today      Subjective/Objective  Dario is an 29 year old year old, presenting for the following diabetes education related to: Presents for: Follow-up  Accompanied by: Self  Diabetes education in the past 24mo: Yes  Focus of Visit: Reducing Risks, Taking Medication, Problem Solving  Diabetes type: Type 2  Disease course: Stable  How confident are you filling out medical forms by yourself:: Extremely  Transportation concerns: No  Difficulty affording diabetes medication?: No  Difficulty affording diabetes testing supplies?: No  Other concerns:: None  Cultural Influences/Ethnic Background:   or     Diabetes Symptoms & Complications:  Diabetes Related Symptoms: Fatigue, Polyphagia  "(increased hunger)  Weight trend: Stable  Symptom course: Improving  Disease course: Stable  Complications assessed today?: Yes  Autonomic neuropathy: No  CVA: No  Heart disease: No  Nephropathy: No  Peripheral neuropathy: No  Peripheral Vascular Disease: No  Retinopathy: No  Sexual dysfunction: No    Patient Problem List and Family Medical History reviewed for relevant medical history, current medical status, and diabetes risk factors.    Vitals:  LMP 12/01/2024 (Exact Date)   Estimated body mass index is 28.92 kg/m  as calculated from the following:    Height as of 12/6/24: 1.575 m (5' 2\").    Weight as of 12/6/24: 71.7 kg (158 lb 1.6 oz).   Last 3 BP:   BP Readings from Last 3 Encounters:   12/06/24 112/71   03/28/23 112/62   02/28/23 112/61       History   Smoking Status     Never   Smokeless Tobacco     Never       Labs:  Lab Results   Component Value Date    A1C 7.5 12/06/2024    A1C 5.8 03/19/2012     Lab Results   Component Value Date    GLC 93 12/06/2024    GLC 79 02/17/2023     Lab Results   Component Value Date     12/06/2024     10/17/2018     HDL Cholesterol   Date Value Ref Range Status   10/17/2018 56 >49 mg/dL Final     Direct Measure HDL   Date Value Ref Range Status   12/06/2024 56 >=50 mg/dL Final   ]  GFR Estimate   Date Value Ref Range Status   12/06/2024 >90 >60 mL/min/1.73m2 Final     Comment:     eGFR calculated using 2021 CKD-EPI equation.     No results found for: \"GFRESTBLACK\"  Lab Results   Component Value Date    CR 0.75 12/06/2024     No results found for: \"MICROALBUMIN\"    Healthy Eating:  Healthy Eating Assessed Today: Yes  Cultural/Christian diet restrictions?: No  Do you have any food allergies or intolerances?: No  Meal planning/habits: Carb counting  Who cooks/prepares meals for you?: Self  Who purchases food in  your home?: Self  How many times a week on average do you eat food made away from home (restaurant/take-out)?: 1 (fast food: [employer] or [employer] or " Mexican restaurant)  Meals include: Lunch, Dinner  Breakfast: skips or smoothie with unsweetened oat milk, yeyo seeds, handful blueberries, spinach and 1/4 banana  Lunch: 12-1PM;  1 egg with egg whites and 1/2 avocado with 2 ww toast and coffee  OR leftover chicken, rice or noodles OR eggs with beans  Dinner: Meat or chicken and beans and rice (white) and mixed vegetables  Snacks: sweet tooth:  cookies, ice cream and chips  (cut back and trying to stay at one serving)  Beverages: Water, Coffee  Has patient met with a dietitian in the past?: Yes (during GDM)    Being Active:  Being Active Assessed Today: Yes  Exercise:: Yes (active job & treadmill)  Days per week of moderate to strenuous exercise (like a brisk walk): 5  On average, minutes per day of exercise at this level: 30  How intense was your typical exercise? : Moderate (like brisk walking)  Exercise Minutes per Week: 150  Barrier to exercise: Time    Taking Medications:  Diabetes Medication(s)       Biguanides       metFORMIN (GLUCOPHAGE) 500 MG tablet Take 1 tablet (500 mg) by mouth daily (with dinner).          Taking Medication Assessed Today: Yes  Current Treatments: Oral Medication (taken by mouth), Diet  Problems taking diabetes medications regularly?: Yes (has forgotten dose a few days/week since starting)  Diabetes medication side effects?: Yes (reports nausea occasionally after taking Metformin)    Problem Solving:  Problem Solving Assessed Today: Yes  Is the patient at risk for hypoglycemia?: No  Is the patient at risk for DKA?: No  Does patient have severe weather/disaster plan for diabetes management?: No  Does patient have sick day plan for diabetes management?: No        Reducing Risks:  Reducing Risks Assessed Today: Yes  Diabetes Risks: Family History  Additional female risks: Gestational Diabetes  CAD Risks: Diabetes Mellitus  Has dilated eye exam at least once a year?: No  Sees dentist every 6 months?: Yes  Feet checked by healthcare  provider in the last year?: No    Healthy Coping:  Healthy Coping Assessed Today: Yes  Emotional response to diabetes: Acceptance  Informal Support system:: Spouse, Friends, Family, Children  Stage of change: ACTION (Actively working towards change)  Support resources: Websites    Naina Knott RDN, ONEYDA, [facility]   Time Spent: 30 minutes  Encounter Type: Individual    Any diabetes medication dose changes were made via the [facility] Standing Orders under the patient's referring provider.

## 2025-01-15 NOTE — PATIENT INSTRUCTIONS
Josh Bishop,    Here are some things that we discussed today.      Goals for Diabetes Care:    1. Eat 3 balanced meals each day - Monitor carb intake and aim for 45-60 grams per meal  (3-4 carbohydrate choices) and 15 grams carbohydrate (1 carbohydrate choice) per snack (2/day  Carbohydrate 1 choice = 15 grams  Aim to eat a balanced plate - half your plate fruits/veggies, 1/4 the plate protein and 1/4 plate starch (rice, potato, pasta)    2. Check blood sugars 2 times each day at varying times   Blood Glucose Targets:   1. Fasting and before meal target is 80 - 130 mg/dl   2. 2 hours after a meal target is < 180 mg/dl  Always remember to bring meter and/or log book to all appointments.    3. Activity really helps improve blood sugars. Try to Incorporate 30 minutes activity into each day - does not need to be all at one time & walking counts!    4. Take diabetes medications as prescribed   -Metformin 500 mg with dinner meal     5. Make appointment for dental cleaning    Follow up with your Diabetic Educator to assess BG targets and need for modifications to medications and/or lifestyle.    Call with any questions.  Thank you!  Naina Knott, RD, LD, [facility]   Certified Diabetes Care &   [facility]  Triage [phone] or Scheduling [phone]

## 2025-02-12 ENCOUNTER — TELEPHONE (OUTPATIENT)
Dept: EDUCATION SERVICES | Facility: CLINIC | Age: 30
End: 2025-02-12

## 2025-02-12 ENCOUNTER — VIRTUAL VISIT (OUTPATIENT)
Dept: EDUCATION SERVICES | Facility: CLINIC | Age: 30
End: 2025-02-12
Payer: COMMERCIAL

## 2025-02-12 DIAGNOSIS — E11.9 TYPE 2 DIABETES MELLITUS WITHOUT COMPLICATION, WITHOUT LONG-TERM CURRENT USE OF INSULIN (H): Primary | ICD-10-CM

## 2025-02-12 DIAGNOSIS — E11.9 TYPE 2 DIABETES MELLITUS WITHOUT COMPLICATION, WITHOUT LONG-TERM CURRENT USE OF INSULIN (H): ICD-10-CM

## 2025-02-12 PROCEDURE — G0108 DIAB MANAGE TRN  PER INDIV: HCPCS | Mod: 95 | Performed by: NUTRITIONIST

## 2025-02-12 NOTE — PATIENT INSTRUCTIONS
Josh Bishop,    Here are some things that we discussed today.      Goals for Diabetes Care:    1. Eat 3 balanced meals each day - Monitor carb intake and aim for 45-60 grams per meal  (3-4 carbohydrate choices) and 15 grams carbohydrate (1 carbohydrate choice) per snack (2/day)    Carbohydrate 1 choice = 15 grams    Aim to eat a balanced plate - half your plate fruits/veggies, 1/4 the plate protein and 1/4 plate starch (rice, potato, pasta)    2. Check blood sugars 1-2 times each day at varying times   Blood Glucose Targets:   1. Fasting and before meal target is 80 - 130 mg/dl   2. 2 hours after a meal target is < 180 mg/dl  Always remember to bring meter and/or log book to all appointments.    3. Activity really helps improve blood sugars. Try to Incorporate 30 minutes activity into each day - does not need to be all at one time & walking counts!    4. Take diabetes medications as prescribed   -Metformin 500 mg -> increase to 2/day (take one with breakfast and one with dinner)    Follow up with your Diabetic Educator to assess BG targets and need for modifications to medications and/or lifestyle.    Call with any questions.  Thank you!  Naina Knott, RD, LD, [facility]   Certified Diabetes Care &   [facility]  Triage [phone] or Scheduling [phone]

## 2025-02-12 NOTE — LETTER
2/12/2025         RE: Dario Nathan  [address] [facility] [address]        Dear Colleague,    Thank you for referring your patient, Dario Nathna, to the [facility]. Please see a copy of my visit note below.    Diabetes Self-Management Education & Support    Presents for: Follow-up    Type of service:  Video Visit    If the video visit is dropped, the video visit invitation should be resent by: Text to cell phone: [phone]    Originating Location (pt. Location): Home  Distant Location (provider location): [facility]  Mode of Communication:  Video Conference via [employer]    Video Start Time:  9:28 AM  Video End Time (time video stopped): 10:02 AM    How would patient like to obtain AVS? MyChart    Assessment  Dario continue to monitor blood glucose levels daily.  For the past 2 weeks her fasting blood sugar are averaging 146 mg/dL and HS readings averaging 134 mg/dL.  She reports that she has been more stressed than usual with school and has decreased her activity levels due to time constraints.  She is taking her Metformin more consistently and is tolerating well.  Recommend today to increase her Metformin 500 mg to 2/day to help decrease fasting blood sugar.  We will follow-up again in 6 weeks.     Patient's most recent   Lab Results   Component Value Date    A1C 7.5 12/06/2024    A1C 5.8 03/19/2012     is not meeting goal of <7.0    Diabetes knowledge and skills assessment:   Patient is knowledgeable in diabetes management concepts related to: Healthy Eating, Being Active, Monitoring, Taking Medication, Problem Solving, Reducing Risks, and Healthy Coping    Based on learning assessment above, most appropriate setting for further diabetes education would be: Individual setting.    Care Plan and Education Provided:  Monitoring: Frequency of monitoring, Individual glucose targets, and Log and interpret results, Taking Medication:  Action of prescribed medication(s) and When to take medication(s), Problem Solving: High glucose - causes, signs/symptoms, treatment and prevention and Low glucose - causes, signs/symptoms, treatment and prevention, and Reducing Risks: Complications of diabetes, Dental care, Eye care, Foot care, Goal for A1c, how it relates to glucose and how often to check, and Preventing cardiovascular disease, including blood pressure goals, lipid goals, recommendations for cardioprotective medications, statins, and aspirin    Patient verbalized understanding of diabetes self-management education concepts discussed, opportunities for ongoing education and support, and recommendations provided today.    Plan  1) Increase Metformin 500 mg to 2/day (take one at breakfast and one at dinner)  2) Continue to monitor blood glucose levels (fasting blood sugar and 2 hours after a meal)  3) Try to go back to exercising 5 days/week  4) Make appointment for eye exam    Topics to cover at upcoming visits: Continuation of Lifestyle Changes     Follow-up:  3/26/25      See Care Plan for co-developed, patient-state behavior change goals.    Education Materials Provided:  No new materials provided today      Subjective/Objective  Dario is an 29 year old year old, presenting for the following diabetes education related to: Presents for: Follow-up  Accompanied by: Self  Diabetes education in the past 24mo: Yes  Focus of Visit: Reducing Risks, Taking Medication, Problem Solving  Diabetes type: Type 2  Disease course: Stable  How confident are you filling out medical forms by yourself:: Extremely  Transportation concerns: No  Difficulty affording diabetes medication?: No  Difficulty affording diabetes testing supplies?: No  Other concerns:: None  Cultural Influences/Ethnic Background:   or     Diabetes Symptoms & Complications:  Diabetes Related Symptoms: Fatigue, Polyphagia (increased hunger)  Weight trend: Stable  Symptom course:  "Improving  Disease course: Stable  Complications assessed today?: Yes  Autonomic neuropathy: No  CVA: No  Heart disease: No  Nephropathy: No  Peripheral neuropathy: No  Peripheral Vascular Disease: No  Retinopathy: No  Sexual dysfunction: No    Patient Problem List and Family Medical History reviewed for relevant medical history, current medical status, and diabetes risk factors.    Vitals:  There were no vitals taken for this visit.  Estimated body mass index is 28.92 kg/m  as calculated from the following:    Height as of 12/6/24: 1.575 m (5' 2\").    Weight as of 12/6/24: 71.7 kg (158 lb 1.6 oz).   Last 3 BP:   BP Readings from Last 3 Encounters:   12/06/24 112/71   03/28/23 112/62   02/28/23 112/61       History   Smoking Status     Never   Smokeless Tobacco     Never       Labs:  Lab Results   Component Value Date    A1C 7.5 12/06/2024    A1C 5.8 03/19/2012     Lab Results   Component Value Date    GLC 93 12/06/2024    GLC 79 02/17/2023     Lab Results   Component Value Date     12/06/2024     10/17/2018     HDL Cholesterol   Date Value Ref Range Status   10/17/2018 56 >49 mg/dL Final     Direct Measure HDL   Date Value Ref Range Status   12/06/2024 56 >=50 mg/dL Final   ]  GFR Estimate   Date Value Ref Range Status   12/06/2024 >90 >60 mL/min/1.73m2 Final     Comment:     eGFR calculated using 2021 CKD-EPI equation.     No results found for: \"GFRESTBLACK\"  Lab Results   Component Value Date    CR 0.75 12/06/2024     No results found for: \"MICROALBUMIN\"    Healthy Eating:  Healthy Eating Assessed Today: Yes  Cultural/Mormonism diet restrictions?: No  Do you have any food allergies or intolerances?: No  Meal planning/habits: Carb counting  Who cooks/prepares meals for you?: Self  Who purchases food in  your home?: Self  How many times a week on average do you eat food made away from home (restaurant/take-out)?: 1 (fast food: [employer] or [employer] or Mexican restaurant)  Meals include: Lunch, " Dinner  Breakfast: smoothie with unsweetened oat milk, yeyo seeds, handful blueberries, spinach and 1/4 banana  Lunch: 12-1PM;  1 egg with egg whites and 1/2 avocado with 2 ww toast and coffee  OR leftover chicken, rice or noodles OR eggs with beans  Dinner: Meat or chicken and beans and rice (white) and mixed vegetables  Snacks: sweet tooth:  cookies, ice cream and chips  (cut back and trying to stay at one serving)  Beverages: Water, Coffee  Has patient met with a dietitian in the past?: Yes (during GDM)    Being Active:  Being Active Assessed Today: Yes  Exercise:: Yes (active job & treadmill)  Days per week of moderate to strenuous exercise (like a brisk walk): 2  On average, minutes per day of exercise at this level: 30  How intense was your typical exercise? : Moderate (like brisk walking)  Exercise Minutes per Week: 60  Barrier to exercise: Time    Monitoring:  Monitoring Assessed Today: Yes  Did patient bring glucose meter to appointment? : Yes  Blood Glucose Meter: Accu-chek  Times checking blood sugar at home (number): 2  Times checking blood sugar at home (per): Day  Blood glucose trend: Decreasing    Taking Medications:  Diabetes Medication(s)       Biguanides       metFORMIN (GLUCOPHAGE) 500 MG tablet Take 1 tablet (500 mg) by mouth daily (with dinner).          Taking Medication Assessed Today: Yes  Current Treatments: Oral Medication (taken by mouth), Diet  Problems taking diabetes medications regularly?: Yes (has forgotten dose a few days/week since starting)  Diabetes medication side effects?: Yes (reports nausea occasionally after taking Metformin)    Problem Solving:  Problem Solving Assessed Today: Yes  Is the patient at risk for hypoglycemia?: No  Is the patient at risk for DKA?: No  Does patient have severe weather/disaster plan for diabetes management?: No  Does patient have sick day plan for diabetes management?: No      Reducing Risks:  Reducing Risks Assessed Today: Yes  Diabetes Risks:  Family History  Additional female risks: Gestational Diabetes  CAD Risks: Diabetes Mellitus  Has dilated eye exam at least once a year?: No  Sees dentist every 6 months?: Yes  Feet checked by healthcare provider in the last year?: No    Healthy Coping:  Healthy Coping Assessed Today: Yes  Emotional response to diabetes: Acceptance  Informal Support system:: Spouse, Friends, Family, Children  Stage of change: ACTION (Actively working towards change)  Support resources: Websites    Naina Knott RDN, ONEYDA, [facility]   Time Spent: 30 minutes  Encounter Type: Individual    Any diabetes medication dose changes were made via the [facility] Standing Orders under the patient's referring provider.

## 2025-02-12 NOTE — TELEPHONE ENCOUNTER
Met with Dario today.   For the past 2 weeks her fasting blood sugar are averaging 146 mg/dL.  She is tolerating her current dose of Metformin.  Recommend to increase dose to two 500 mg Metformin daily.  She is agreeable to increase.  Set up for your approval.    Naina Knott, RDN, LDN, [facility]

## 2025-02-12 NOTE — PROGRESS NOTES
Diabetes Self-Management Education & Support    Presents for: Follow-up    Type of service:  Video Visit    If the video visit is dropped, the video visit invitation should be resent by: Text to cell phone: [phone]    Originating Location (pt. Location): Home  Distant Location (provider location): [facility]  Mode of Communication:  Video Conference via [employer]    Video Start Time:  9:28 AM  Video End Time (time video stopped): 10:02 AM    How would patient like to obtain AVS? MyChart    Assessment  Dario continue to monitor blood glucose levels daily.  For the past 2 weeks her fasting blood sugar are averaging 146 mg/dL and HS readings averaging 134 mg/dL.  She reports that she has been more stressed than usual with school and has decreased her activity levels due to time constraints.  She is taking her Metformin more consistently and is tolerating well.  Recommend today to increase her Metformin 500 mg to 2/day to help decrease fasting blood sugar.  We will follow-up again in 6 weeks.     Patient's most recent   Lab Results   Component Value Date    A1C 7.5 12/06/2024    A1C 5.8 03/19/2012     is not meeting goal of <7.0    Diabetes knowledge and skills assessment:   Patient is knowledgeable in diabetes management concepts related to: Healthy Eating, Being Active, Monitoring, Taking Medication, Problem Solving, Reducing Risks, and Healthy Coping    Based on learning assessment above, most appropriate setting for further diabetes education would be: Individual setting.    Care Plan and Education Provided:  Monitoring: Frequency of monitoring, Individual glucose targets, and Log and interpret results, Taking Medication: Action of prescribed medication(s) and When to take medication(s), Problem Solving: High glucose - causes, signs/symptoms, treatment and prevention and Low glucose - causes, signs/symptoms, treatment and prevention, and Reducing Risks: Complications of  diabetes, Dental care, Eye care, Foot care, Goal for A1c, how it relates to glucose and how often to check, and Preventing cardiovascular disease, including blood pressure goals, lipid goals, recommendations for cardioprotective medications, statins, and aspirin    Patient verbalized understanding of diabetes self-management education concepts discussed, opportunities for ongoing education and support, and recommendations provided today.    Plan  1) Increase Metformin 500 mg to 2/day (take one at breakfast and one at dinner)  2) Continue to monitor blood glucose levels (fasting blood sugar and 2 hours after a meal)  3) Try to go back to exercising 5 days/week  4) Make appointment for eye exam    Topics to cover at upcoming visits: Continuation of Lifestyle Changes     Follow-up:  3/26/25      See Care Plan for co-developed, patient-state behavior change goals.    Education Materials Provided:  No new materials provided today      Subjective/Objective  Dario is an 29 year old year old, presenting for the following diabetes education related to: Presents for: Follow-up  Accompanied by: Self  Diabetes education in the past 24mo: Yes  Focus of Visit: Reducing Risks, Taking Medication, Problem Solving  Diabetes type: Type 2  Disease course: Stable  How confident are you filling out medical forms by yourself:: Extremely  Transportation concerns: No  Difficulty affording diabetes medication?: No  Difficulty affording diabetes testing supplies?: No  Other concerns:: None  Cultural Influences/Ethnic Background:   or     Diabetes Symptoms & Complications:  Diabetes Related Symptoms: Fatigue, Polyphagia (increased hunger)  Weight trend: Stable  Symptom course: Improving  Disease course: Stable  Complications assessed today?: Yes  Autonomic neuropathy: No  CVA: No  Heart disease: No  Nephropathy: No  Peripheral neuropathy: No  Peripheral Vascular Disease: No  Retinopathy: No  Sexual dysfunction: No    Patient  "Problem List and Family Medical History reviewed for relevant medical history, current medical status, and diabetes risk factors.    Vitals:  There were no vitals taken for this visit.  Estimated body mass index is 28.92 kg/m  as calculated from the following:    Height as of 12/6/24: 1.575 m (5' 2\").    Weight as of 12/6/24: 71.7 kg (158 lb 1.6 oz).   Last 3 BP:   BP Readings from Last 3 Encounters:   12/06/24 112/71   03/28/23 112/62   02/28/23 112/61       History   Smoking Status    Never   Smokeless Tobacco    Never       Labs:  Lab Results   Component Value Date    A1C 7.5 12/06/2024    A1C 5.8 03/19/2012     Lab Results   Component Value Date    GLC 93 12/06/2024    GLC 79 02/17/2023     Lab Results   Component Value Date     12/06/2024     10/17/2018     HDL Cholesterol   Date Value Ref Range Status   10/17/2018 56 >49 mg/dL Final     Direct Measure HDL   Date Value Ref Range Status   12/06/2024 56 >=50 mg/dL Final   ]  GFR Estimate   Date Value Ref Range Status   12/06/2024 >90 >60 mL/min/1.73m2 Final     Comment:     eGFR calculated using 2021 CKD-EPI equation.     No results found for: \"GFRESTBLACK\"  Lab Results   Component Value Date    CR 0.75 12/06/2024     No results found for: \"MICROALBUMIN\"    Healthy Eating:  Healthy Eating Assessed Today: Yes  Cultural/Spiritism diet restrictions?: No  Do you have any food allergies or intolerances?: No  Meal planning/habits: Carb counting  Who cooks/prepares meals for you?: Self  Who purchases food in  your home?: Self  How many times a week on average do you eat food made away from home (restaurant/take-out)?: 1 (fast food: [employer] or [employer] or Mexican restaurant)  Meals include: Lunch, Dinner  Breakfast: smoothie with unsweetened oat milk, yeyo seeds, handful blueberries, spinach and 1/4 banana  Lunch: 12-1PM;  1 egg with egg whites and 1/2 avocado with 2 ww toast and coffee  OR leftover chicken, rice or noodles OR eggs with beans  Dinner: " Meat or chicken and beans and rice (white) and mixed vegetables  Snacks: sweet tooth:  cookies, ice cream and chips  (cut back and trying to stay at one serving)  Beverages: Water, Coffee  Has patient met with a dietitian in the past?: Yes (during GDM)    Being Active:  Being Active Assessed Today: Yes  Exercise:: Yes (active job & treadmill)  Days per week of moderate to strenuous exercise (like a brisk walk): 2  On average, minutes per day of exercise at this level: 30  How intense was your typical exercise? : Moderate (like brisk walking)  Exercise Minutes per Week: 60  Barrier to exercise: Time    Monitoring:  Monitoring Assessed Today: Yes  Did patient bring glucose meter to appointment? : Yes  Blood Glucose Meter: Accu-chek  Times checking blood sugar at home (number): 2  Times checking blood sugar at home (per): Day  Blood glucose trend: Decreasing    Taking Medications:  Diabetes Medication(s)       Biguanides       metFORMIN (GLUCOPHAGE) 500 MG tablet Take 1 tablet (500 mg) by mouth daily (with dinner).          Taking Medication Assessed Today: Yes  Current Treatments: Oral Medication (taken by mouth), Diet  Problems taking diabetes medications regularly?: Yes (has forgotten dose a few days/week since starting)  Diabetes medication side effects?: Yes (reports nausea occasionally after taking Metformin)    Problem Solving:  Problem Solving Assessed Today: Yes  Is the patient at risk for hypoglycemia?: No  Is the patient at risk for DKA?: No  Does patient have severe weather/disaster plan for diabetes management?: No  Does patient have sick day plan for diabetes management?: No      Reducing Risks:  Reducing Risks Assessed Today: Yes  Diabetes Risks: Family History  Additional female risks: Gestational Diabetes  CAD Risks: Diabetes Mellitus  Has dilated eye exam at least once a year?: No  Sees dentist every 6 months?: Yes  Feet checked by healthcare provider in the last year?: No    Healthy Coping:  Healthy  Coping Assessed Today: Yes  Emotional response to diabetes: Acceptance  Informal Support system:: Spouse, Friends, Family, Children  Stage of change: ACTION (Actively working towards change)  Support resources: Websites    Naina Knott RDN, ONEYDA, [facility]   Time Spent: 30 minutes  Encounter Type: Individual    Any diabetes medication dose changes were made via the [facility] Standing Orders under the patient's referring provider.

## 2025-03-08 ENCOUNTER — HEALTH MAINTENANCE LETTER (OUTPATIENT)
Age: 30
End: 2025-03-08

## 2025-03-26 ENCOUNTER — VIRTUAL VISIT (OUTPATIENT)
Dept: EDUCATION SERVICES | Facility: CLINIC | Age: 30
End: 2025-03-26
Payer: COMMERCIAL

## 2025-03-26 DIAGNOSIS — E11.9 TYPE 2 DIABETES MELLITUS WITHOUT COMPLICATION, WITHOUT LONG-TERM CURRENT USE OF INSULIN (H): Primary | ICD-10-CM

## 2025-03-26 PROCEDURE — 98967 PH1 ASSMT&MGMT NQHP 11-20: CPT | Mod: 95 | Performed by: NUTRITIONIST

## 2025-03-26 NOTE — LETTER
3/26/2025         RE: Dario Nathan  [address] [facility] [address]        Dear Colleague,    Thank you for referring your patient, Dario Nathan, to the [facility]. Please see a copy of my visit note below.    Diabetes Self-Management Education & Support    Presents for: Follow-up    Type of service:  Video Visit    If the video visit is dropped, the video visit invitation should be resent by: Text to cell phone: [phone]    Originating Location (pt. Location): Home  Distant Location (provider location): [facility]  Mode of Communication:  Video Conference via [employer]    Video Start Time:  9:32 AM  Video End Time (time video stopped): 9:50 AM    How would patient like to obtain AVS? MyChart      Assessment  Dario reports no issues with taking 1000 mg of Metformin daily, did find that her fasting blood sugar readings have decreased and are mostly under 130 mg/dL.  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  Will set up order for approval, patient aware that insurance may not approve.  She has rejoined her gym and is planning to use it 3 days/week.  Encouraged to monitor post meals once in awhile if CGM is not used.      Patient's most recent   Lab Results   Component Value Date    A1C 7.5 12/06/2024    A1C 5.8 03/19/2012     is not meeting goal of <7.0    Diabetes knowledge and skills assessment:   Patient is knowledgeable in diabetes management concepts related to: Healthy Eating, Being Active, Monitoring, Taking Medication, Problem Solving, Reducing Risks, and Healthy Coping    Based on learning assessment above, most appropriate setting for further diabetes education would be: Individual setting.    Care Plan and Education Provided:  Being Active: Amount recommended (150 minutes moderate or 75 minutes vigorous activity and 2-3 days strength training per week) and  Finding a physical activity routine that works for you, Monitoring: Blood glucose versus Continuous Glucose Monitoring, and Taking Medication: Side effects of prescribed medication(s)    Patient verbalized understanding of diabetes self-management education concepts discussed, opportunities for ongoing education and support, and recommendations provided today.    Plan    1) Make appointment for dilated eye exam  2) If using CGM then make follow-up appointment with educator to review data    Topics to cover at upcoming visits: Continuation of Lifestyle Changes     Follow-up:  PRN    See Care Plan for co-developed, patient-state behavior change goals.    Education Materials Provided:  No new materials provided today      Subjective/Objective  Dario is an 29 year old year old, presenting for the following diabetes education related to: Presents for: Follow-up  Accompanied by: Self  Diabetes education in the past 24mo: Yes  Focus of Visit: Monitoring, Being Active  Diabetes type: Type 2  Disease course: Improving  How confident are you filling out medical forms by yourself:: Extremely  Transportation concerns: No  Difficulty affording diabetes medication?: No  Difficulty affording diabetes testing supplies?: No  Other concerns:: None  Cultural Influences/Ethnic Background:   or     Diabetes Symptoms & Complications:  Diabetes Related Symptoms: None  Weight trend: Stable  Symptom course: Resolved  Disease course: Improving  Complications assessed today?: Yes  Autonomic neuropathy: No  CVA: No  Heart disease: No  Nephropathy: No  Peripheral neuropathy: No  Peripheral Vascular Disease: No  Retinopathy: No  Sexual dysfunction: No    Patient Problem List and Family Medical History reviewed for relevant medical history, current medical status, and diabetes risk factors.    Vitals:  There were no vitals taken for this visit.  Estimated body mass index is 28.92 kg/m  as calculated from the following:    Height as  "of 12/6/24: 1.575 m (5' 2\").    Weight as of 12/6/24: 71.7 kg (158 lb 1.6 oz).   Last 3 BP:   BP Readings from Last 3 Encounters:   12/06/24 112/71   03/28/23 112/62   02/28/23 112/61       History   Smoking Status     Never   Smokeless Tobacco     Never       Labs:  Lab Results   Component Value Date    A1C 7.5 12/06/2024    A1C 5.8 03/19/2012     Lab Results   Component Value Date    GLC 93 12/06/2024    GLC 79 02/17/2023     Lab Results   Component Value Date     12/06/2024     10/17/2018     HDL Cholesterol   Date Value Ref Range Status   10/17/2018 56 >49 mg/dL Final     Direct Measure HDL   Date Value Ref Range Status   12/06/2024 56 >=50 mg/dL Final   ]  GFR Estimate   Date Value Ref Range Status   12/06/2024 >90 >60 mL/min/1.73m2 Final     Comment:     eGFR calculated using 2021 CKD-EPI equation.     No results found for: \"GFRESTBLACK\"  Lab Results   Component Value Date    CR 0.75 12/06/2024     No results found for: \"MICROALBUMIN\"    Being Active:  Being Active Assessed Today: Yes  Exercise:: Yes (active job & rejoined gym)  Days per week of moderate to strenuous exercise (like a brisk walk): 3  On average, minutes per day of exercise at this level: 40  How intense was your typical exercise? : Moderate (like brisk walking)  Exercise Minutes per Week: 120  Barrier to exercise: Time    Monitoring:  Monitoring Assessed Today: Yes  Did patient bring glucose meter to appointment? : Yes  Blood Glucose Meter: Accu-chek  Times checking blood sugar at home (number): 2  Times checking blood sugar at home (per): Day  Blood glucose trend: Decreasing    Taking Medications:  Diabetes Medication(s)       Biguanides       metFORMIN (GLUCOPHAGE) 500 MG tablet Take 1 tablet (500 mg) by mouth 2 times daily (with meals).          Taking Medication Assessed Today: Yes  Current Treatments: Oral Medication (taken by mouth), Diet  Problems taking diabetes medications regularly?: Yes (has forgotten dose a few " days/week since starting)  Diabetes medication side effects?: Yes (reports nausea occasionally after taking Metformin)    Healthy Coping:  Healthy Coping Assessed Today: Yes  Emotional response to diabetes: Acceptance  Informal Support system:: Spouse, Friends, Family, Children  Stage of change: ACTION (Actively working towards change)  Support resources: Websites    Naina Knott RDN, ONEYDA, [facility]   Time Spent: 18 minutes  Encounter Type: Individual    Any diabetes medication dose changes were made via the [facility] Standing Orders under the patient's referring provider.

## 2025-03-26 NOTE — PROGRESS NOTES
Diabetes Self-Management Education & Support    Presents for: Follow-up    Type of service:  Video Visit    If the video visit is dropped, the video visit invitation should be resent by: Text to cell phone: [phone]    Originating Location (pt. Location): Home  Distant Location (provider location): [facility]  Mode of Communication:  Video Conference via FINXI    Video Start Time:  9:32 AM  Video End Time (time video stopped): 9:50 AM    How would patient like to obtain AVS? MyChart      Assessment  Dario reports no issues with taking 1000 mg of Metformin daily, did find that her fasting blood sugar readings have decreased and are mostly under 130 mg/dL.  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  Will set up order for approval, patient aware that insurance may not approve.  She has rejoined her gym and is planning to use it 3 days/week.  Encouraged to monitor post meals once in awhile if CGM is not used.      Patient's most recent   Lab Results   Component Value Date    A1C 7.5 12/06/2024    A1C 5.8 03/19/2012     is not meeting goal of <7.0    Diabetes knowledge and skills assessment:   Patient is knowledgeable in diabetes management concepts related to: Healthy Eating, Being Active, Monitoring, Taking Medication, Problem Solving, Reducing Risks, and Healthy Coping    Based on learning assessment above, most appropriate setting for further diabetes education would be: Individual setting.    Care Plan and Education Provided:  Being Active: Amount recommended (150 minutes moderate or 75 minutes vigorous activity and 2-3 days strength training per week) and Finding a physical activity routine that works for you, Monitoring: Blood glucose versus Continuous Glucose Monitoring, and Taking Medication: Side effects of prescribed medication(s)    Patient verbalized understanding of diabetes self-management  "education concepts discussed, opportunities for ongoing education and support, and recommendations provided today.    Plan    1) Make appointment for dilated eye exam  2) If using CGM then make follow-up appointment with educator to review data    Topics to cover at upcoming visits: Continuation of Lifestyle Changes     Follow-up:  PRN    See Care Plan for co-developed, patient-state behavior change goals.    Education Materials Provided:  No new materials provided today      Subjective/Objective  Dario is an 29 year old year old, presenting for the following diabetes education related to: Presents for: Follow-up  Accompanied by: Self  Diabetes education in the past 24mo: Yes  Focus of Visit: Monitoring, Being Active  Diabetes type: Type 2  Disease course: Improving  How confident are you filling out medical forms by yourself:: Extremely  Transportation concerns: No  Difficulty affording diabetes medication?: No  Difficulty affording diabetes testing supplies?: No  Other concerns:: None  Cultural Influences/Ethnic Background:   or     Diabetes Symptoms & Complications:  Diabetes Related Symptoms: None  Weight trend: Stable  Symptom course: Resolved  Disease course: Improving  Complications assessed today?: Yes  Autonomic neuropathy: No  CVA: No  Heart disease: No  Nephropathy: No  Peripheral neuropathy: No  Peripheral Vascular Disease: No  Retinopathy: No  Sexual dysfunction: No    Patient Problem List and Family Medical History reviewed for relevant medical history, current medical status, and diabetes risk factors.    Vitals:  There were no vitals taken for this visit.  Estimated body mass index is 28.92 kg/m  as calculated from the following:    Height as of 12/6/24: 1.575 m (5' 2\").    Weight as of 12/6/24: 71.7 kg (158 lb 1.6 oz).   Last 3 BP:   BP Readings from Last 3 Encounters:   12/06/24 112/71   03/28/23 112/62   02/28/23 112/61       History   Smoking Status    Never   Smokeless Tobacco    " "Never       Labs:  Lab Results   Component Value Date    A1C 7.5 12/06/2024    A1C 5.8 03/19/2012     Lab Results   Component Value Date    GLC 93 12/06/2024    GLC 79 02/17/2023     Lab Results   Component Value Date     12/06/2024     10/17/2018     HDL Cholesterol   Date Value Ref Range Status   10/17/2018 56 >49 mg/dL Final     Direct Measure HDL   Date Value Ref Range Status   12/06/2024 56 >=50 mg/dL Final   ]  GFR Estimate   Date Value Ref Range Status   12/06/2024 >90 >60 mL/min/1.73m2 Final     Comment:     eGFR calculated using 2021 CKD-EPI equation.     No results found for: \"GFRESTBLACK\"  Lab Results   Component Value Date    CR 0.75 12/06/2024     No results found for: \"MICROALBUMIN\"    Being Active:  Being Active Assessed Today: Yes  Exercise:: Yes (active job & rejoined gym)  Days per week of moderate to strenuous exercise (like a brisk walk): 3  On average, minutes per day of exercise at this level: 40  How intense was your typical exercise? : Moderate (like brisk walking)  Exercise Minutes per Week: 120  Barrier to exercise: Time    Monitoring:  Monitoring Assessed Today: Yes  Did patient bring glucose meter to appointment? : Yes  Blood Glucose Meter: Accu-chek  Times checking blood sugar at home (number): 2  Times checking blood sugar at home (per): Day  Blood glucose trend: Decreasing    Taking Medications:  Diabetes Medication(s)       Biguanides       metFORMIN (GLUCOPHAGE) 500 MG tablet Take 1 tablet (500 mg) by mouth 2 times daily (with meals).          Taking Medication Assessed Today: Yes  Current Treatments: Oral Medication (taken by mouth), Diet  Problems taking diabetes medications regularly?: Yes (has forgotten dose a few days/week since starting)  Diabetes medication side effects?: Yes (reports nausea occasionally after taking Metformin)    Healthy Coping:  Healthy Coping Assessed Today: Yes  Emotional response to diabetes: Acceptance  Informal Support system:: Spouse, " Friends, Family, Children  Stage of change: ACTION (Actively working towards change)  Support resources: Websites    Naina Knott RDN, [facility], [facility]   Time Spent: 18 minutes  Encounter Type: Individual    Any diabetes medication dose changes were made via the [facility] Standing Orders under the patient's referring provider.

## 2025-03-26 NOTE — PATIENT INSTRUCTIONS
"Josh Bishop,    Here are some things that we discussed today.      Freestyle Cyndi 3 or 3+    SENSOR BASICS:  Understand that your glucose sensor measures your glucose in your interstitial fluid and your blood sugar measures your glucose in your blood stream. The readings are not meant to be the same.        Your sensor glucose will lag behind your blood glucose.  \"Remember the roller coaster\".  Your blood sugar is always the front car and your sensor glucose is always the last car.  When blood sugar is moving up or down, the more difference there will be.          Take it easy while wearing the sensor.  Take extra care while bathing and getting dressed.  Wear loose fitting clothes on your sensor and try to avoid laying on your sensor.  You can shower/bathe with the sensor on.  But avoid submerging the sensor more than 3 feet for more than 30 minutes.  Gently pat to dry.    INITIAL SET UP:  Download the [employer] lilly if using your smartphone and create an account.  The lilly will walk you through set up.  If you are using the [employer] , turn it on and follow instructions for set up.      [employer] lilly:       There will be a 60 minute warm up for each new sensor.  Each sensor should last 14 days.  Do not take more than 500mg of vitamin C (Cyndi 3+ more than 1000mg) per day or this can affect sensor accuracy.    The first 12 hours of every new sensor often a little \"off\" as it gets to know your body fluids.  Set glucose alarms for highs and lows per your preference or at the recommendations of your diabetes educator.  You will not be able to silence or turn off the urgent low alert at 54 mg/dL.  If an alarm goes off, go to your lilly and acknowledge it or you will continue to get alarmed every 5 minutes.  Your [employer] lilly or  will notify you when it is time to change your sensor.  Just remove it like a band aid and throw it in the trash, then place a new sensor.  It is recommended to switch arms with every " "sensor.    DAILY ROUTINE:  Keep your phone or  within 33 feet of you to keep data communicating with your device.  If you are away from your device for more than 20 minutes, your device will alarm.  Be curious with what the sensor data shows.  How do your food choices impact your glucose?  Exercise?  Complete a fingerstick glucose check at these times:                          -when you feel differently than the sensor indicates                          -when you are not wearing a sensor                          -when you see this symbol:     DATA SHARING:  If you want to share your sensor data with a loved one (for phone users only), send them an invitation through the Cyndi 3 lilly.  They will use the [employer] lilly and accept your invitation.      [facility] lilly:   Our clinic will link to your data as well (phone users only). Under the menu (3 lines in the upper left corner), go to connected apps, then touch \"connect\" next to [facility], and then \"connect to a practice\".  Enter our practice ID \"[id number]\" and hit connect.  Patients using the reader can upload from home via desktop or laptop computer on [employer] or will have the  downloaded in clinic.     OTHER IMPORTANT NOTES:  If the sensor is often falling off before the 14 days are up, there are products than can help.  Talk to your diabetes educator.  You can leave your Cyndi sensor on for radiology scans (Xray, CT scan or MRI), however it is recommended that you use fingersticks for accurate readings during and 1 hour after an MRI as the accuracy of the sensor is questionable during this time.  If you need help with setting up your lilly, starting a new sensor or other training, you can call [phone] or sign up at: [email or web address]  They can help you 1:1 by phone or virtual visit.  Contact the  if the sensor does not last the full 14 days for any reason, or if you have any other technical problems. Keep " "your Cyndi sensor box with the lot and serial numbers as they often ask for this information.  -Cyndi customer service phone number: [phone]     -Website for cyndi replacement due to sensor failure or falling off: https://www.[email or web address]/us-en/support/sensor-support-form-questions.html  If your copay is more than $75 a month, call the copay assistance line at [phone] and they will work with your pharmacy to get your cost down.  Or, you can give the pharmacy the following information:      [id number], Group: [id number], Static ID: [id number], EXP: 12/31/25     Turn off automatic OS updates and do not update your phone's OS until you check your diabetes device 's website to verify that the medical apps you use are compatible with the new OS version. Turn off automatic OS updates by navigating to your system settings, usually accessible through a gear icon, and find the \"software update\" option; within this section, look for a toggle switch labeled \"automatic updates\" or similar, and disable it.    After updating your OS or adding a new accessory such as wireless headphones, confirm alert settings and then carefully monitor your medical device lilly to make sure you can receive and hear alerts as expected.    At least once a month, check that your smartphone alerts are configured as expected. Ensure your volume, vibration, notifications, and other relevant settings still work.         Blood Glucose Targets:   1. Fasting and before meal target is 80 - 130 mg/dl   2. 2 hours after a meal target is < 180 mg/dl                            Target for CGM:              - Time in target >70%    Follow up with your Diabetic Educator to assess BG targets and need for modifications to medications and/or lifestyle.    Call with any questions.  Thank you!  Naina Knott RD, LD, [facility]   Certified Diabetes Care &   [facility]  Triage [phone] or Scheduling [phone]    "

## 2025-05-19 ENCOUNTER — MYC MEDICAL ADVICE (OUTPATIENT)
Dept: INTERNAL MEDICINE | Facility: CLINIC | Age: 30
End: 2025-05-19
Payer: COMMERCIAL

## 2025-05-19 ENCOUNTER — MYC MEDICAL ADVICE (OUTPATIENT)
Dept: EDUCATION SERVICES | Facility: CLINIC | Age: 30
End: 2025-05-19
Payer: COMMERCIAL

## 2025-05-19 DIAGNOSIS — E11.9 TYPE 2 DIABETES MELLITUS WITHOUT COMPLICATION, WITHOUT LONG-TERM CURRENT USE OF INSULIN (H): Primary | ICD-10-CM

## 2025-05-19 RX ORDER — HYDROCHLOROTHIAZIDE 12.5 MG/1
CAPSULE ORAL
Qty: 6 EACH | Refills: 1 | Status: SHIPPED | OUTPATIENT
Start: 2025-05-19 | End: 2025-05-19

## 2025-05-19 NOTE — TELEPHONE ENCOUNTER
Last A1c - 12/6/2024 - level was 7.5. Per provider note, diabetic education referral sent and patient advised to follow up in 3 months to repeat A1c.    Hortensia, do you want patient to follow up with you or just lab draw for A1c? If just lab draw, A1c will need to be signed.    Thank you,  Александр, Triage SARAH Enriquez    11:10 AM 5/19/2025

## 2025-06-22 ENCOUNTER — HEALTH MAINTENANCE LETTER (OUTPATIENT)
Age: 30
End: 2025-06-22

## (undated) DEVICE — SOL WATER IRRIG 1000ML BOTTLE 07139-09

## (undated) DEVICE — STRAP KNEE/BODY 31143004

## (undated) DEVICE — DRSG ABDOMINAL 07 1/2X8" 7197D

## (undated) DEVICE — DRSG TELFA ISLAND 4X10"

## (undated) DEVICE — GLOVE PROTEXIS BLUE W/NEU-THERA 7.0  2D73EB70

## (undated) DEVICE — DRSG STERI STRIP 1/2X4" R1547

## (undated) DEVICE — CATH TRAY FOLEY SURESTEP 16FR WDRAIN BAG STLK LATEX A300316A

## (undated) DEVICE — STOCKING SLEEVE COMPRESSION CALF LG

## (undated) DEVICE — SOL NACL 0.9% IRRIG 1000ML BOTTLE 07138-09

## (undated) DEVICE — SU PLAIN 3-0 CTX 27" 873H

## (undated) DEVICE — SU MONOCRYL 4-0 PS-2 18" UND Y496G

## (undated) DEVICE — PREP CHLORAPREP 26ML TINTED ORANGE  260815

## (undated) DEVICE — GLOVE ESTEEM POWDER FREE SMT 6.5  2D72PT65

## (undated) DEVICE — SU VICRYL 0 CT-1 36" J346H

## (undated) DEVICE — PACK C-SECTION LF PL15OTA83B

## (undated) RX ORDER — KETOROLAC TROMETHAMINE 30 MG/ML
INJECTION, SOLUTION INTRAMUSCULAR; INTRAVENOUS
Status: DISPENSED
Start: 2023-02-14

## (undated) RX ORDER — BUPIVACAINE HYDROCHLORIDE 2.5 MG/ML
INJECTION, SOLUTION EPIDURAL; INFILTRATION; INTRACAUDAL
Status: DISPENSED
Start: 2023-02-14

## (undated) RX ORDER — FENTANYL CITRATE 50 UG/ML
INJECTION, SOLUTION INTRAMUSCULAR; INTRAVENOUS
Status: DISPENSED
Start: 2023-02-14